# Patient Record
Sex: FEMALE | Race: WHITE | NOT HISPANIC OR LATINO | URBAN - METROPOLITAN AREA
[De-identification: names, ages, dates, MRNs, and addresses within clinical notes are randomized per-mention and may not be internally consistent; named-entity substitution may affect disease eponyms.]

---

## 2018-09-07 ENCOUNTER — EMERGENCY (EMERGENCY)
Facility: HOSPITAL | Age: 73
LOS: 1 days | Discharge: HOME | End: 2018-09-07
Attending: EMERGENCY MEDICINE
Payer: MEDICARE

## 2018-09-07 VITALS
DIASTOLIC BLOOD PRESSURE: 58 MMHG | TEMPERATURE: 98 F | OXYGEN SATURATION: 100 % | WEIGHT: 240.08 LBS | RESPIRATION RATE: 20 BRPM | SYSTOLIC BLOOD PRESSURE: 138 MMHG | HEIGHT: 67 IN | HEART RATE: 60 BPM

## 2018-09-07 VITALS
TEMPERATURE: 98 F | OXYGEN SATURATION: 98 % | RESPIRATION RATE: 18 BRPM | DIASTOLIC BLOOD PRESSURE: 63 MMHG | HEART RATE: 65 BPM | SYSTOLIC BLOOD PRESSURE: 134 MMHG

## 2018-09-07 PROCEDURE — 71250 CT THORAX DX C-: CPT | Mod: 26

## 2018-09-07 PROCEDURE — 70450 CT HEAD/BRAIN W/O DYE: CPT | Mod: 26

## 2018-09-07 PROCEDURE — 99284 EMERGENCY DEPT VISIT MOD MDM: CPT

## 2018-09-07 PROCEDURE — 70450 CT HEAD/BRAIN W/O DYE: CPT

## 2018-09-07 PROCEDURE — 71250 CT THORAX DX C-: CPT

## 2018-09-07 PROCEDURE — 99284 EMERGENCY DEPT VISIT MOD MDM: CPT | Mod: 25

## 2018-09-07 RX ORDER — ACETAMINOPHEN 500 MG
650 TABLET ORAL ONCE
Refills: 0 | Status: COMPLETED | OUTPATIENT
Start: 2018-09-07 | End: 2018-09-07

## 2018-09-07 RX ADMIN — Medication 650 MILLIGRAM(S): at 21:42

## 2018-09-07 NOTE — ED PROVIDER NOTE - OBJECTIVE STATEMENT
this is a 74 y/o female comes in s/p mechanical trip and fall   struck back of head and back pf left chest   no LOC on plavix

## 2018-09-07 NOTE — ED ADULT TRIAGE NOTE - CHIEF COMPLAINT QUOTE
Patient BIBA to ED today with c/o trip and fall hitting the back of her head, no LOC.  Patient is on plavix.  Dr. Bourne at bedside, Priority CT called.

## 2018-09-07 NOTE — ED ADULT NURSE NOTE - NSIMPLEMENTINTERV_GEN_ALL_ED
Implemented All Fall Risk Interventions:  [place] to call system. Call bell, personal items and telephone within reach. Instruct patient to call for assistance. Room bathroom lighting operational. Non-slip footwear when patient is off stretcher. Physically safe environment: no spills, clutter or unnecessary equipment. Stretcher in lowest position, wheels locked, appropriate side rails in place. Provide visual cue, wrist band, yellow gown, etc. Monitor gait and stability. Monitor for mental status changes and reorient to person, place, and time. Review medications for side effects contributing to fall risk. Reinforce activity limits and safety measures with patient and family.

## 2018-09-07 NOTE — ED PROVIDER NOTE - PHYSICAL EXAMINATION
pt mechanical fall fell and hit head   no   left back left paraspinal midthoracic tenderness ribs    BS   neck supple

## 2019-10-08 NOTE — ED PROVIDER NOTE - CONSTITUTIONAL MENTATION
awake/alert/oriented to person, place, time/situation Spironolactone Pregnancy And Lactation Text: This medication can cause feminization of the male fetus and should be avoided during pregnancy. The active metabolite is also found in breast milk.

## 2022-04-12 PROBLEM — Z00.00 ENCOUNTER FOR PREVENTIVE HEALTH EXAMINATION: Status: ACTIVE | Noted: 2022-04-12

## 2022-04-21 ENCOUNTER — APPOINTMENT (OUTPATIENT)
Dept: ORTHOPEDIC SURGERY | Facility: CLINIC | Age: 77
End: 2022-04-21
Payer: MEDICARE

## 2022-04-21 VITALS — WEIGHT: 272 LBS | BODY MASS INDEX: 43.71 KG/M2 | HEIGHT: 66 IN

## 2022-04-21 DIAGNOSIS — M81.0 AGE-RELATED OSTEOPOROSIS W/OUT CURRENT PATHOLOGICAL FRACTURE: ICD-10-CM

## 2022-04-21 DIAGNOSIS — R56.9 UNSPECIFIED CONVULSIONS: ICD-10-CM

## 2022-04-21 DIAGNOSIS — M19.90 UNSPECIFIED OSTEOARTHRITIS, UNSPECIFIED SITE: ICD-10-CM

## 2022-04-21 PROCEDURE — 73502 X-RAY EXAM HIP UNI 2-3 VIEWS: CPT

## 2022-04-21 PROCEDURE — 99203 OFFICE O/P NEW LOW 30 MIN: CPT

## 2022-04-21 RX ORDER — METHOCARBAMOL 500 MG/1
500 TABLET, FILM COATED ORAL 3 TIMES DAILY
Qty: 90 | Refills: 0 | Status: ACTIVE | COMMUNITY
Start: 2022-04-21 | End: 1900-01-01

## 2022-08-12 ENCOUNTER — APPOINTMENT (OUTPATIENT)
Dept: ORTHOPEDIC SURGERY | Facility: HOSPITAL | Age: 77
End: 2022-08-12

## 2022-08-26 ENCOUNTER — APPOINTMENT (OUTPATIENT)
Dept: ORTHOPEDIC SURGERY | Facility: CLINIC | Age: 77
End: 2022-08-26

## 2022-09-07 ENCOUNTER — OUTPATIENT (OUTPATIENT)
Dept: OUTPATIENT SERVICES | Facility: HOSPITAL | Age: 77
LOS: 1 days | Discharge: HOME | End: 2022-09-07

## 2022-09-07 VITALS
TEMPERATURE: 97 F | SYSTOLIC BLOOD PRESSURE: 156 MMHG | OXYGEN SATURATION: 97 % | RESPIRATION RATE: 16 BRPM | DIASTOLIC BLOOD PRESSURE: 69 MMHG | WEIGHT: 272.27 LBS | HEIGHT: 66 IN | HEART RATE: 68 BPM

## 2022-09-07 DIAGNOSIS — R56.9 UNSPECIFIED CONVULSIONS: ICD-10-CM

## 2022-09-07 DIAGNOSIS — Z98.890 OTHER SPECIFIED POSTPROCEDURAL STATES: Chronic | ICD-10-CM

## 2022-09-07 DIAGNOSIS — M16.12 UNILATERAL PRIMARY OSTEOARTHRITIS, LEFT HIP: ICD-10-CM

## 2022-09-07 DIAGNOSIS — Z90.49 ACQUIRED ABSENCE OF OTHER SPECIFIED PARTS OF DIGESTIVE TRACT: Chronic | ICD-10-CM

## 2022-09-07 DIAGNOSIS — G47.30 SLEEP APNEA, UNSPECIFIED: ICD-10-CM

## 2022-09-07 DIAGNOSIS — I10 ESSENTIAL (PRIMARY) HYPERTENSION: ICD-10-CM

## 2022-09-07 DIAGNOSIS — E78.5 HYPERLIPIDEMIA, UNSPECIFIED: ICD-10-CM

## 2022-09-07 DIAGNOSIS — Z87.81 PERSONAL HISTORY OF (HEALED) TRAUMATIC FRACTURE: Chronic | ICD-10-CM

## 2022-09-07 DIAGNOSIS — I25.10 ATHEROSCLEROTIC HEART DISEASE OF NATIVE CORONARY ARTERY WITHOUT ANGINA PECTORIS: ICD-10-CM

## 2022-09-07 DIAGNOSIS — Z01.818 ENCOUNTER FOR OTHER PREPROCEDURAL EXAMINATION: ICD-10-CM

## 2022-09-07 LAB
A1C WITH ESTIMATED AVERAGE GLUCOSE RESULT: 5.7 % — HIGH (ref 4–5.6)
ALBUMIN SERPL ELPH-MCNC: 3.2 G/DL — LOW (ref 3.3–5)
ALP SERPL-CCNC: 102 U/L — SIGNIFICANT CHANGE UP (ref 40–120)
ALT FLD-CCNC: 15 U/L — SIGNIFICANT CHANGE UP (ref 12–78)
ANION GAP SERPL CALC-SCNC: 2 MMOL/L — LOW (ref 5–17)
APTT BLD: 30 SEC — SIGNIFICANT CHANGE UP (ref 27.5–35.5)
AST SERPL-CCNC: 12 U/L — LOW (ref 15–37)
BASOPHILS # BLD AUTO: 0.07 K/UL — SIGNIFICANT CHANGE UP (ref 0–0.2)
BASOPHILS NFR BLD AUTO: 0.6 % — SIGNIFICANT CHANGE UP (ref 0–2)
BILIRUB SERPL-MCNC: 0.3 MG/DL — SIGNIFICANT CHANGE UP (ref 0.2–1.2)
BUN SERPL-MCNC: 21 MG/DL — SIGNIFICANT CHANGE UP (ref 7–23)
CALCIUM SERPL-MCNC: 9.4 MG/DL — SIGNIFICANT CHANGE UP (ref 8.5–10.1)
CHLORIDE SERPL-SCNC: 110 MMOL/L — HIGH (ref 96–108)
CO2 SERPL-SCNC: 29 MMOL/L — SIGNIFICANT CHANGE UP (ref 22–31)
CREAT SERPL-MCNC: 0.75 MG/DL — SIGNIFICANT CHANGE UP (ref 0.5–1.3)
EGFR: 83 ML/MIN/1.73M2 — SIGNIFICANT CHANGE UP
EOSINOPHIL # BLD AUTO: 0.18 K/UL — SIGNIFICANT CHANGE UP (ref 0–0.5)
EOSINOPHIL NFR BLD AUTO: 1.5 % — SIGNIFICANT CHANGE UP (ref 0–6)
ESTIMATED AVERAGE GLUCOSE: 117 MG/DL — HIGH (ref 68–114)
GLUCOSE SERPL-MCNC: 107 MG/DL — HIGH (ref 70–99)
HCT VFR BLD CALC: 41.4 % — SIGNIFICANT CHANGE UP (ref 34.5–45)
HGB BLD-MCNC: 12.7 G/DL — SIGNIFICANT CHANGE UP (ref 11.5–15.5)
IMM GRANULOCYTES NFR BLD AUTO: 0.4 % — SIGNIFICANT CHANGE UP (ref 0–1.5)
INR BLD: 0.93 RATIO — SIGNIFICANT CHANGE UP (ref 0.88–1.16)
LYMPHOCYTES # BLD AUTO: 1.42 K/UL — SIGNIFICANT CHANGE UP (ref 1–3.3)
LYMPHOCYTES # BLD AUTO: 11.8 % — LOW (ref 13–44)
MCHC RBC-ENTMCNC: 30.7 G/DL — LOW (ref 32–36)
MCHC RBC-ENTMCNC: 31.8 PG — SIGNIFICANT CHANGE UP (ref 27–34)
MCV RBC AUTO: 103.5 FL — HIGH (ref 80–100)
MONOCYTES # BLD AUTO: 0.8 K/UL — SIGNIFICANT CHANGE UP (ref 0–0.9)
MONOCYTES NFR BLD AUTO: 6.6 % — SIGNIFICANT CHANGE UP (ref 2–14)
MRSA PCR RESULT.: SIGNIFICANT CHANGE UP
NEUTROPHILS # BLD AUTO: 9.56 K/UL — HIGH (ref 1.8–7.4)
NEUTROPHILS NFR BLD AUTO: 79.1 % — HIGH (ref 43–77)
NRBC # BLD: 0 /100 WBCS — SIGNIFICANT CHANGE UP (ref 0–0)
PLATELET # BLD AUTO: 408 K/UL — HIGH (ref 150–400)
POTASSIUM SERPL-MCNC: 4.3 MMOL/L — SIGNIFICANT CHANGE UP (ref 3.5–5.3)
POTASSIUM SERPL-SCNC: 4.3 MMOL/L — SIGNIFICANT CHANGE UP (ref 3.5–5.3)
PROT SERPL-MCNC: 7.8 GM/DL — SIGNIFICANT CHANGE UP (ref 6–8.3)
PROTHROM AB SERPL-ACNC: 11.2 SEC — SIGNIFICANT CHANGE UP (ref 10.5–13.4)
RBC # BLD: 4 M/UL — SIGNIFICANT CHANGE UP (ref 3.8–5.2)
RBC # FLD: 12.1 % — SIGNIFICANT CHANGE UP (ref 10.3–14.5)
S AUREUS DNA NOSE QL NAA+PROBE: SIGNIFICANT CHANGE UP
SODIUM SERPL-SCNC: 141 MMOL/L — SIGNIFICANT CHANGE UP (ref 135–145)
VIT D25+D1,25 OH+D1,25 PNL SERPL-MCNC: 45.9 PG/ML — SIGNIFICANT CHANGE UP (ref 19.9–79.3)
WBC # BLD: 12.08 K/UL — HIGH (ref 3.8–10.5)
WBC # FLD AUTO: 12.08 K/UL — HIGH (ref 3.8–10.5)

## 2022-09-07 PROCEDURE — 86077 PHYS BLOOD BANK SERV XMATCH: CPT

## 2022-09-07 NOTE — PHYSICAL THERAPY INITIAL EVALUATION ADULT - GAIT DEVIATIONS NOTED, PT EVAL
antalgic gait c excessive flexion posture./decreased ana/increased time in double stance/decreased velocity of limb motion/decreased step length/decreased stride length/decreased weight-shifting ability

## 2022-09-07 NOTE — PHYSICAL THERAPY INITIAL EVALUATION ADULT - NSPTDMEREC_GEN_A_CORE
Pt owns a straight cane, rollator, and a shower bench and they are in good working condition. Pt will purchase a raised toilet seat w/o arms herself./rolling walker

## 2022-09-07 NOTE — H&P PST ADULT - PROBLEM SELECTOR PLAN 7
Assessment and Plan: labs - cbc,pt/ptt,bmp,t&s,nose cx,ekg  M/C required and cardiac  preop 3 day hibiclens instruction reviewed and given .instructed on if  nose cx positive use mupuricin 5 days and checklist given  take routine meds DOS with sips of water. avoid NSAID and OTC supplements. verbalized understanding  information on proper nutrition , increase protein and better food choices provided in packet  ensure clear  anesthesiologist to review pst labs, ekg, medical clearances and optimization for surgery

## 2022-09-07 NOTE — PHYSICAL THERAPY INITIAL EVALUATION ADULT - TINETTI BALANCE TEST, REHAB EVAL
Sitting Balance - 1/1 , Rises From Chair - 1/2, Attempts to rise -2 /2 , Immediate Standing Balance -1 /2,  Standing Balance - 1/2, Nudged -2 /2, Eyes Closed -1 /1,  Turning 360 Deg - 1 /2, Sitting down - 1/2, Balance Score -11 /16

## 2022-09-07 NOTE — PHYSICAL THERAPY INITIAL EVALUATION ADULT - LIVES WITH, PROFILE
daughter in a private house. Daughter will be available to assist pt in post -op care upon discharge home.

## 2022-09-07 NOTE — PHYSICAL THERAPY INITIAL EVALUATION ADULT - ASSISTIVE DEVICE FOR STAIR TRANSFER, REHAB EVAL
SW attempted to call [employer] but was told by the State that they are experiencing high call volume and they will call back.    Awaiting 142.        Jenni Garcia LMSW   bilateral rails

## 2022-09-07 NOTE — PHYSICAL THERAPY INITIAL EVALUATION ADULT - TINETTI GAIT TEST, REHAB EVAL
Indication of gait -0/1,   Step Length and height -1/2,   Foot Clearance -2/2,   Step Symmetry - 0/1,  Step Continuity -0/1,   Path -1/ 2,   Trunk -1/2,   Walking Time -1/1,   Total Score -6 /12

## 2022-09-07 NOTE — PHYSICAL THERAPY INITIAL EVALUATION ADULT - ADDITIONAL COMMENTS
As per pt : There are 3 steps, c R rail up, at the entry of the house and no steps to negotiate at home. Pt has a tub/shower combo c fixed/retractable shower head, and regular toilet seat c 2 grab bars in BR. Pt deferred 3-1 commode and would purchase a raised toilet seat w/o arms herself. Average pain level at rest is 0/10. Pain increases to 10/10 c sit to stand transfer, activities, and ambulation. Pt takes Tramadol prn for pain. Pt has no experience of adverse effects with pain medication. Pt is not on out-pt physical therapy at present. There is no h/o fall or knee buckling recently. Pt wears glasses for reading and distance and no need for hearing aid.

## 2022-09-07 NOTE — PHYSICAL THERAPY INITIAL EVALUATION ADULT - GENERAL OBSERVATIONS, REHAB EVAL
Patient was seen seated  in wheelchair in PT/OT preoperative assessment room with no apparent distress. Height:  5.6     ; weight :  276   lbs. Daughter, Jaci present.

## 2022-09-07 NOTE — OCCUPATIONAL THERAPY INITIAL EVALUATION ADULT - PERTINENT HX OF CURRENT PROBLEM, REHAB EVAL
L hip OA which impacts pts ability to perform functional tasks/transfers and mobility. Pt is scheduled for L THR on 09/27/22.

## 2022-09-07 NOTE — H&P PST ADULT - NSANTHOSAYNRD_GEN_A_CORE
No. BENY screening performed.  STOP BANG Legend: 0-2 = LOW Risk; 3-4 = INTERMEDIATE Risk; 5-8 = HIGH Risk

## 2022-09-07 NOTE — OCCUPATIONAL THERAPY INITIAL EVALUATION ADULT - ADDITIONAL COMMENTS
Pt lives with daughter and son in law (Who can assist post op) in a private house with 3 steps to enter with a R rail. Once inside, the pt bedroom and bathroom is on that floor when entering. The pt bathroom has a tub/shower combination, fixed/retractable shower head, standard toilet seat and grab bars + (1x on each side of toilet). Pts daughter reports that she is going to buy a raised toilet seat without arms. Pt deferred 3/1 commode. The pt ambulates with a rollator all the time and owns a cane. The pt daily pain is a 0/10 at rest and a 10/10 with movement. The pt manages the pain with rest, heating pad, muscle relaxer and Tramadol. The pt has no recent outpatient PT, no recent falls and has no buckling of the knees. The pt wears glasses all the time, R handed, does not drive and has no hearing impairments.

## 2022-09-07 NOTE — H&P PST ADULT - HISTORY OF PRESENT ILLNESS
78 yo female , pmh- htn, hld, acd  s/p PCI 10 years ago , siezure ( well controlled) c/o left hip pain secondary to osteoarthritis- scheduled for left  hip arthroplasty  goal : to walk without pain   denies recent travels in the past 30 days. No fever, SOB, cough, flu like symptoms or body rash- covid screen  covid vaccine completed         -

## 2022-09-07 NOTE — H&P PST ADULT - HAVE YOU HAD A FIRST COVID-19 BOOSTER?
Subjective:      Rigoberto Springer is a 27 y.o. female who presents with Rash            Rash  This is a new problem. The current episode started 1 to 4 weeks ago. The problem has been gradually worsening since onset. The rash is diffuse. The rash is characterized by redness, dryness, swelling and itchiness. She was exposed to nothing. Pertinent negatives include no diarrhea, fever or vomiting. Past treatments include topical steroids and antihistamine. The treatment provided mild relief.   Hives, 28 weeks pregnant      PMH:  has a past medical history of acne (8/17/2009); Menstrual irregularity (8/17/2009); Bipolar affective (CMS-[facility]) (8/17/2009); Anxiety (2010); ASTHMA; Depression (2010); Kidney disease; Migraine; Substance abuse; and Urinary tract infection, site not specified.  MEDS:   Current outpatient prescriptions:   •  Prenatal MV-Min-Fe Fum-FA-DHA (PRENATAL 1 PO), Take  by mouth., Disp: , Rfl:   •  albuterol (VENTOLIN OR PROVENTIL) 108 (90 BASE) MCG/ACT AERS, Inhale 4-6 Puffs by mouth every four hours as needed for Shortness of Breath., Disp: 1 Inhaler, Rfl: 3  •  drospirenone-ethinyl estradiol (ALANNA) 3-0.03 MG per tablet, Take 1 Tab by mouth every day., Disp: 28 Tab, Rfl: 1  •  phenazopyridine (PYRIDIUM) 200 MG TABS, Take 1 Tab by mouth 3 times a day as needed., Disp: 6 Tab, Rfl: 0  •  hydrocodone-acetaminophen (NORCO) 5-325 MG TABS per tablet, Take 1-2 Tabs by mouth every 6 hours as needed. (Patient not taking: Reported on 7/18/2015), Disp: 20 Tab, Rfl: 0  •  drospirenone-ethinyl estradiol (ALANNA) 3-0.03 MG per tablet, Take 1 Tab by mouth every day., Disp: 28 Tab, Rfl: 11  ALLERGIES:   Allergies   Allergen Reactions   • Nkda [No Known Drug Allergy]      SURGHX:   Past Surgical History   Procedure Laterality Date   • Dental surgery       [place] teeth.      SOCHX:  reports that she quit smoking about 5 months ago. Her smoking use included Cigarettes. She has a 3 pack-year smoking history. She has  "never used smokeless tobacco. She reports that she uses illicit drugs (Marijuana). She reports that she does not drink alcohol.  FH: family history includes Alcohol/Drug in her paternal grandmother.    Review of Systems   Constitutional: Negative for fever, chills and malaise/fatigue.   HENT: Negative.    Respiratory: Negative.    Cardiovascular: Negative.    Gastrointestinal: Negative for nausea, vomiting, abdominal pain and diarrhea.   Skin: Positive for itching and rash.       Medications, Allergies, and current problem list reviewed today in Epic     Objective:     /68 mmHg  Pulse 99  Temp(Src) 36.9 °C (98.4 °F)  Resp 14  Ht 1.6 m (5' 2.99\")  Wt 55.339 kg (122 lb)  BMI 21.62 kg/m2  SpO2 96%  LMP 01/25/2017     Physical Exam   Constitutional: She is oriented to person, place, and time. She appears well-developed and well-nourished. No distress.   HENT:   Head: Normocephalic and atraumatic.   Right Ear: External ear normal.   Left Ear: External ear normal.   Eyes: Conjunctivae and EOM are normal. Right eye exhibits no discharge. Left eye exhibits no discharge.   Neck: Normal range of motion. Neck supple.   Cardiovascular: Normal rate, regular rhythm and normal heart sounds.    Pulmonary/Chest: Effort normal and breath sounds normal. No respiratory distress. She has no wheezes.   Neurological: She is alert and oriented to person, place, and time.   Skin: Skin is warm and dry. Rash noted. Rash is urticarial (diffuse, pruritic. No open lesions or sxs/infxn). She is not diaphoretic. There is erythema.   Psychiatric: She has a normal mood and affect. Her behavior is normal. Judgment and thought content normal.   Nursing note and vitals reviewed.              Assessment/Plan:     1. Atopic dermatitis, unspecified type  triamcinolone acetonide (KENALOG) 0.025 % Cream    DISCONTINUED: TRIAMCINOLONE ACETONIDE, TOP, 0.05 % Ointment   2. 28 weeks gestation of pregnancy       Hives, no know triggers. INtensely " pruritic.  According to UpToDate. Short term low-medium potency topical triamcinolone safe after first trimester. Advised to use THIN layer BID for no more than 3-5 days. Call OB to verify treatment.  Continue Benadryl  Follow/up with OB  Return to clinic or go to ED if symptoms worsen or persist. Indications for ED discussed at length. Patient voices understanding. Follow-up with your primary care provider in 3-5 days. Red flags discussed.    Please note that this dictation was created using voice recognition software. I have made every reasonable attempt to correct obvious errors, but I expect that there are errors of grammar and possibly content that I did not discover before finalizing the note.     Yes

## 2022-09-07 NOTE — H&P PST ADULT - NSICDXPASTSURGICALHX_GEN_ALL_CORE_FT
PAST SURGICAL HISTORY:  H/O fracture of arm     H/O knee surgery     H/O wrist surgery     S/P appendectomy     S/P fermin     S/P primary angioplasty

## 2022-09-07 NOTE — PHYSICAL THERAPY INITIAL EVALUATION ADULT - PERTINENT HX OF CURRENT PROBLEM, REHAB EVAL
L hip OA c chronic pain and  limiting functional mobility. Pt is scheduled for L hip elective total joint replacement on 9/27/22  by  Dr. Armendariz.

## 2022-09-07 NOTE — H&P PST ADULT - ASSESSMENT
left hip osteoarthritis   CAPRINI SCORE    AGE RELATED RISK FACTORS                                                       MOBILITY RELATED FACTORS  [ ] Age 41-60 years                                            (1 Point)                  [ ] Bed rest                                                        (1 Point)  [ ] Age: 61-74 years                                           (2 Points)                [ ] Plaster cast                                                   (2 Points)  [ x] Age= 75 years                                              (3 Points)                 [ ] Bed bound for more than 72 hours                   (2 Points)    DISEASE RELATED RISK FACTORS                                               GENDER SPECIFIC FACTORS  [ ] Edema in the lower extremities                       (1 Point)                  [ ] Pregnancy                                                     (1 Point)  [ ] Varicose veins                                               (1 Point)                  [ ] Post-partum < 6 weeks                                   (1 Point)             [x ] BMI > 25 Kg/m2                                            (1 Point)                  [ ] Hormonal therapy  or oral contraception            (1 Point)                 [ ] Sepsis (in the previous month)                        (1 Point)                  [ ] History of pregnancy complications  [ ] Pneumonia or serious lung disease                                               [ ] Unexplained or recurrent                       (1 Point)           (in the previous month)                               (1 Point)  [ ] Abnormal pulmonary function test                     (1 Point)                 SURGERY RELATED RISK FACTORS  [ ] Acute myocardial infarction                              (1 Point)                 [ ]  Section                                            (1 Point)  [ ] Congestive heart failure (in the previous month)  (1 Point)                 [ ] Minor surgery                                                 (1 Point)   [ ] Inflammatory bowel disease                             (1 Point)                 [ ] Arthroscopic surgery                                        (2 Points)  [ ] Central venous access                                    (2 Points)                [ ] General surgery lasting more than 45 minutes   (2 Points)       [ ] Stroke (in the previous month)                          (5 Points)               x[ ] Elective arthroplasty                                        (5 Points)                                                                                                                                               HEMATOLOGY RELATED FACTORS                                                 TRAUMA RELATED RISK FACTORS  [ ] Prior episodes of VTE                                     (3 Points)                 [ ] Fracture of the hip, pelvis, or leg                       (5 Points)  [ ] Positive family history for VTE                         (3 Points)                 [ ] Acute spinal cord injury (in the previous month)  (5 Points)  [ ] Prothrombin 56563 A                                      (3 Points)                 [ ] Paralysis  (less than 1 month)                          (5 Points)  [ ] Factor V Leiden                                             (3 Points)                 [ ] Multiple Trauma within 1 month                         (5 Points)  [ ] Lupus anticoagulants                                     (3 Points)                                                           [ ] Anticardiolipin antibodies                                (3 Points)                                                       [ ] High homocysteine in the blood                      (3 Points)                                             [ ] Other congenital or acquired thrombophilia       (3 Points)                                                [ ] Heparin induced thrombocytopenia                  (3 Points)                                          Total Score [   9      ]  Caprini Score 0-2: Low risk, No VTE Prophylaxis required for most patient's, encourage ambulation  Caprini Score 3-6: At Risk, Pharmacologic VTE prophylaxis is indicated for most patients ( in the absence of a contraindication)  Caprini Score Greater than or = 7: High Risk , pharmacologic VTE is indicated for most patients ( in the absence of a contraindication)    Caprini score indicates that the patient is high risk for VTE event ( score 6 or greater). Surgical patient's in this group will benefit from both pharmacologic prophylaxis and intermittent compression devices . Surgical team will determine the balance between VTE  risk and bleeding risk and other clinical considerations

## 2022-09-07 NOTE — OCCUPATIONAL THERAPY INITIAL EVALUATION ADULT - NSOTDISCHREC_GEN_A_CORE
Home with home OT for home safety evaluation and to improve functional ADLs and transfers/mobility pending post op OT evaluation.

## 2022-09-07 NOTE — OCCUPATIONAL THERAPY INITIAL EVALUATION ADULT - NSOTDMEREC_GEN_A_CORE
Recommending 3/1 commode and rolling walker for safe transfers and ADL management. Pts daughter reports that she is going to buy a raised toilet seat.

## 2022-09-07 NOTE — H&P PST ADULT - NSICDXPASTMEDICALHX_GEN_ALL_CORE_FT
PAST MEDICAL HISTORY:  Anxiety and depression     CAD (coronary artery disease)     History of seizure     HLD (hyperlipidemia)     HTN (hypertension)

## 2022-09-13 PROBLEM — Z00.00 ENCOUNTER FOR PREVENTIVE HEALTH EXAMINATION: Status: ACTIVE | Noted: 2022-09-13

## 2022-09-26 ENCOUNTER — TRANSCRIPTION ENCOUNTER (OUTPATIENT)
Age: 77
End: 2022-09-26

## 2022-09-26 RX ORDER — CLOPIDOGREL BISULFATE 75 MG/1
75 TABLET, FILM COATED ORAL DAILY
Refills: 0 | Status: DISCONTINUED | OUTPATIENT
Start: 2022-09-28 | End: 2022-09-28

## 2022-09-26 RX ORDER — METOPROLOL TARTRATE 50 MG
50 TABLET ORAL DAILY
Refills: 0 | Status: DISCONTINUED | OUTPATIENT
Start: 2022-09-27 | End: 2022-09-28

## 2022-09-26 RX ORDER — SENNA PLUS 8.6 MG/1
2 TABLET ORAL AT BEDTIME
Refills: 0 | Status: DISCONTINUED | OUTPATIENT
Start: 2022-09-27 | End: 2022-09-28

## 2022-09-26 RX ORDER — DEXAMETHASONE 0.5 MG/5ML
10 ELIXIR ORAL ONCE
Refills: 0 | Status: COMPLETED | OUTPATIENT
Start: 2022-09-28 | End: 2022-09-28

## 2022-09-26 RX ORDER — ONDANSETRON 8 MG/1
4 TABLET, FILM COATED ORAL EVERY 6 HOURS
Refills: 0 | Status: DISCONTINUED | OUTPATIENT
Start: 2022-09-27 | End: 2022-09-28

## 2022-09-26 RX ORDER — ATORVASTATIN CALCIUM 80 MG/1
80 TABLET, FILM COATED ORAL AT BEDTIME
Refills: 0 | Status: DISCONTINUED | OUTPATIENT
Start: 2022-09-27 | End: 2022-09-28

## 2022-09-26 RX ORDER — HYDROCHLOROTHIAZIDE 25 MG
12.5 TABLET ORAL DAILY
Refills: 0 | Status: DISCONTINUED | OUTPATIENT
Start: 2022-09-27 | End: 2022-09-28

## 2022-09-26 RX ORDER — POLYETHYLENE GLYCOL 3350 17 G/17G
17 POWDER, FOR SOLUTION ORAL AT BEDTIME
Refills: 0 | Status: DISCONTINUED | OUTPATIENT
Start: 2022-09-27 | End: 2022-09-28

## 2022-09-26 RX ORDER — ASCORBIC ACID 60 MG
500 TABLET,CHEWABLE ORAL
Refills: 0 | Status: DISCONTINUED | OUTPATIENT
Start: 2022-09-27 | End: 2022-09-28

## 2022-09-26 RX ORDER — ACETAMINOPHEN 500 MG
1000 TABLET ORAL ONCE
Refills: 0 | Status: COMPLETED | OUTPATIENT
Start: 2022-09-27 | End: 2022-09-27

## 2022-09-26 RX ORDER — HYDROMORPHONE HYDROCHLORIDE 2 MG/ML
0.5 INJECTION INTRAMUSCULAR; INTRAVENOUS; SUBCUTANEOUS ONCE
Refills: 0 | Status: DISCONTINUED | OUTPATIENT
Start: 2022-09-27 | End: 2022-09-28

## 2022-09-26 RX ORDER — LISINOPRIL 2.5 MG/1
5 TABLET ORAL DAILY
Refills: 0 | Status: DISCONTINUED | OUTPATIENT
Start: 2022-09-27 | End: 2022-09-28

## 2022-09-26 RX ORDER — SODIUM CHLORIDE 9 MG/ML
1000 INJECTION, SOLUTION INTRAVENOUS
Refills: 0 | Status: DISCONTINUED | OUTPATIENT
Start: 2022-09-27 | End: 2022-09-28

## 2022-09-26 RX ORDER — OXYCODONE HYDROCHLORIDE 5 MG/1
5 TABLET ORAL EVERY 4 HOURS
Refills: 0 | Status: DISCONTINUED | OUTPATIENT
Start: 2022-09-27 | End: 2022-09-28

## 2022-09-26 RX ORDER — PANTOPRAZOLE SODIUM 20 MG/1
40 TABLET, DELAYED RELEASE ORAL
Refills: 0 | Status: DISCONTINUED | OUTPATIENT
Start: 2022-09-27 | End: 2022-09-28

## 2022-09-26 RX ORDER — CARBAMAZEPINE 200 MG
200 TABLET ORAL
Refills: 0 | Status: DISCONTINUED | OUTPATIENT
Start: 2022-09-27 | End: 2022-09-28

## 2022-09-26 RX ORDER — ASPIRIN/CALCIUM CARB/MAGNESIUM 324 MG
81 TABLET ORAL
Refills: 0 | Status: DISCONTINUED | OUTPATIENT
Start: 2022-09-28 | End: 2022-09-28

## 2022-09-26 RX ORDER — OXYCODONE HYDROCHLORIDE 5 MG/1
10 TABLET ORAL EVERY 4 HOURS
Refills: 0 | Status: DISCONTINUED | OUTPATIENT
Start: 2022-09-27 | End: 2022-09-28

## 2022-09-26 RX ORDER — CELECOXIB 200 MG/1
200 CAPSULE ORAL EVERY 12 HOURS
Refills: 0 | Status: DISCONTINUED | OUTPATIENT
Start: 2022-09-28 | End: 2022-09-28

## 2022-09-27 ENCOUNTER — APPOINTMENT (OUTPATIENT)
Dept: ORTHOPEDIC SURGERY | Facility: HOSPITAL | Age: 77
End: 2022-09-27

## 2022-09-27 ENCOUNTER — TRANSCRIPTION ENCOUNTER (OUTPATIENT)
Age: 77
End: 2022-09-27

## 2022-09-27 ENCOUNTER — INPATIENT (INPATIENT)
Facility: HOSPITAL | Age: 77
LOS: 0 days | Discharge: HOME | End: 2022-09-28
Attending: STUDENT IN AN ORGANIZED HEALTH CARE EDUCATION/TRAINING PROGRAM | Admitting: STUDENT IN AN ORGANIZED HEALTH CARE EDUCATION/TRAINING PROGRAM

## 2022-09-27 VITALS
TEMPERATURE: 98 F | HEART RATE: 72 BPM | HEIGHT: 66 IN | SYSTOLIC BLOOD PRESSURE: 151 MMHG | DIASTOLIC BLOOD PRESSURE: 76 MMHG | RESPIRATION RATE: 18 BRPM | WEIGHT: 270.07 LBS | OXYGEN SATURATION: 97 %

## 2022-09-27 DIAGNOSIS — Z98.890 OTHER SPECIFIED POSTPROCEDURAL STATES: Chronic | ICD-10-CM

## 2022-09-27 DIAGNOSIS — Z90.49 ACQUIRED ABSENCE OF OTHER SPECIFIED PARTS OF DIGESTIVE TRACT: Chronic | ICD-10-CM

## 2022-09-27 DIAGNOSIS — Z87.81 PERSONAL HISTORY OF (HEALED) TRAUMATIC FRACTURE: Chronic | ICD-10-CM

## 2022-09-27 LAB
ANION GAP SERPL CALC-SCNC: 6 MMOL/L — SIGNIFICANT CHANGE UP (ref 5–17)
BUN SERPL-MCNC: 20 MG/DL — SIGNIFICANT CHANGE UP (ref 7–23)
CALCIUM SERPL-MCNC: 8.9 MG/DL — SIGNIFICANT CHANGE UP (ref 8.5–10.1)
CHLORIDE SERPL-SCNC: 110 MMOL/L — HIGH (ref 96–108)
CO2 SERPL-SCNC: 27 MMOL/L — SIGNIFICANT CHANGE UP (ref 22–31)
CREAT SERPL-MCNC: 0.78 MG/DL — SIGNIFICANT CHANGE UP (ref 0.5–1.3)
EGFR: 79 ML/MIN/1.73M2 — SIGNIFICANT CHANGE UP
GLUCOSE SERPL-MCNC: 118 MG/DL — HIGH (ref 70–99)
HCT VFR BLD CALC: 37.6 % — SIGNIFICANT CHANGE UP (ref 34.5–45)
HGB BLD-MCNC: 12.1 G/DL — SIGNIFICANT CHANGE UP (ref 11.5–15.5)
MCHC RBC-ENTMCNC: 32.2 G/DL — SIGNIFICANT CHANGE UP (ref 32–36)
MCHC RBC-ENTMCNC: 32.5 PG — SIGNIFICANT CHANGE UP (ref 27–34)
MCV RBC AUTO: 101.1 FL — HIGH (ref 80–100)
NRBC # BLD: 0 /100 WBCS — SIGNIFICANT CHANGE UP (ref 0–0)
PLATELET # BLD AUTO: 387 K/UL — SIGNIFICANT CHANGE UP (ref 150–400)
POTASSIUM SERPL-MCNC: 4.6 MMOL/L — SIGNIFICANT CHANGE UP (ref 3.5–5.3)
POTASSIUM SERPL-SCNC: 4.6 MMOL/L — SIGNIFICANT CHANGE UP (ref 3.5–5.3)
RBC # BLD: 3.72 M/UL — LOW (ref 3.8–5.2)
RBC # FLD: 12 % — SIGNIFICANT CHANGE UP (ref 10.3–14.5)
SODIUM SERPL-SCNC: 143 MMOL/L — SIGNIFICANT CHANGE UP (ref 135–145)
WBC # BLD: 18.88 K/UL — HIGH (ref 3.8–10.5)
WBC # FLD AUTO: 18.88 K/UL — HIGH (ref 3.8–10.5)

## 2022-09-27 PROCEDURE — 73501 X-RAY EXAM HIP UNI 1 VIEW: CPT | Mod: 26

## 2022-09-27 PROCEDURE — 27130 TOTAL HIP ARTHROPLASTY: CPT | Mod: LT

## 2022-09-27 PROCEDURE — 27130 TOTAL HIP ARTHROPLASTY: CPT | Mod: AS,LT

## 2022-09-27 DEVICE — ALTRAX NEUT 32IDX50OD: Type: IMPLANTABLE DEVICE | Site: LEFT | Status: FUNCTIONAL

## 2022-09-27 DEVICE — FEM HD BIOLOX CERAMIC: Type: IMPLANTABLE DEVICE | Site: LEFT | Status: FUNCTIONAL

## 2022-09-27 DEVICE — CUP ACETABULAR 50MM: Type: IMPLANTABLE DEVICE | Site: LEFT | Status: FUNCTIONAL

## 2022-09-27 DEVICE — STEM FEM TAPR STD COLLAR 12/14 SZ 5: Type: IMPLANTABLE DEVICE | Site: LEFT | Status: FUNCTIONAL

## 2022-09-27 RX ORDER — ROSUVASTATIN CALCIUM 5 MG/1
1 TABLET ORAL
Qty: 0 | Refills: 0 | DISCHARGE

## 2022-09-27 RX ORDER — METOPROLOL TARTRATE 50 MG
1 TABLET ORAL
Qty: 0 | Refills: 0 | DISCHARGE

## 2022-09-27 RX ORDER — LISINOPRIL 2.5 MG/1
1 TABLET ORAL
Qty: 0 | Refills: 0 | DISCHARGE

## 2022-09-27 RX ORDER — ACETAMINOPHEN 500 MG
1000 TABLET ORAL ONCE
Refills: 0 | Status: COMPLETED | OUTPATIENT
Start: 2022-09-27 | End: 2023-08-26

## 2022-09-27 RX ORDER — LANOLIN ALCOHOL/MO/W.PET/CERES
3 CREAM (GRAM) TOPICAL AT BEDTIME
Refills: 0 | Status: DISCONTINUED | OUTPATIENT
Start: 2022-09-27 | End: 2022-09-28

## 2022-09-27 RX ORDER — ACETAMINOPHEN 500 MG
650 TABLET ORAL ONCE
Refills: 0 | Status: COMPLETED | OUTPATIENT
Start: 2022-09-27 | End: 2022-09-27

## 2022-09-27 RX ORDER — CELECOXIB 200 MG/1
200 CAPSULE ORAL ONCE
Refills: 0 | Status: COMPLETED | OUTPATIENT
Start: 2022-09-27 | End: 2022-09-27

## 2022-09-27 RX ORDER — INFLUENZA VIRUS VACCINE 15; 15; 15; 15 UG/.5ML; UG/.5ML; UG/.5ML; UG/.5ML
0.7 SUSPENSION INTRAMUSCULAR ONCE
Refills: 0 | Status: COMPLETED | OUTPATIENT
Start: 2022-09-27 | End: 2022-09-27

## 2022-09-27 RX ORDER — HYDROMORPHONE HYDROCHLORIDE 2 MG/ML
1 INJECTION INTRAMUSCULAR; INTRAVENOUS; SUBCUTANEOUS
Refills: 0 | Status: DISCONTINUED | OUTPATIENT
Start: 2022-09-27 | End: 2022-09-27

## 2022-09-27 RX ORDER — CARBAMAZEPINE 200 MG
1 TABLET ORAL
Qty: 0 | Refills: 0 | DISCHARGE

## 2022-09-27 RX ORDER — SODIUM CHLORIDE 9 MG/ML
3 INJECTION INTRAMUSCULAR; INTRAVENOUS; SUBCUTANEOUS EVERY 8 HOURS
Refills: 0 | Status: DISCONTINUED | OUTPATIENT
Start: 2022-09-27 | End: 2022-09-27

## 2022-09-27 RX ORDER — CLOPIDOGREL BISULFATE 75 MG/1
1 TABLET, FILM COATED ORAL
Qty: 0 | Refills: 0 | DISCHARGE

## 2022-09-27 RX ORDER — SODIUM CHLORIDE 9 MG/ML
1000 INJECTION, SOLUTION INTRAVENOUS
Refills: 0 | Status: DISCONTINUED | OUTPATIENT
Start: 2022-09-27 | End: 2022-09-27

## 2022-09-27 RX ORDER — ACETAMINOPHEN 500 MG
1000 TABLET ORAL ONCE
Refills: 0 | Status: COMPLETED | OUTPATIENT
Start: 2022-09-27 | End: 2022-09-28

## 2022-09-27 RX ORDER — CEFAZOLIN SODIUM 1 G
3000 VIAL (EA) INJECTION EVERY 8 HOURS
Refills: 0 | Status: COMPLETED | OUTPATIENT
Start: 2022-09-27 | End: 2022-09-28

## 2022-09-27 RX ORDER — ONDANSETRON 8 MG/1
4 TABLET, FILM COATED ORAL ONCE
Refills: 0 | Status: DISCONTINUED | OUTPATIENT
Start: 2022-09-27 | End: 2022-09-27

## 2022-09-27 RX ORDER — HYDROMORPHONE HYDROCHLORIDE 2 MG/ML
0.5 INJECTION INTRAMUSCULAR; INTRAVENOUS; SUBCUTANEOUS
Refills: 0 | Status: DISCONTINUED | OUTPATIENT
Start: 2022-09-27 | End: 2022-09-27

## 2022-09-27 RX ADMIN — Medication 200 MILLIGRAM(S): at 21:42

## 2022-09-27 RX ADMIN — CELECOXIB 200 MILLIGRAM(S): 200 CAPSULE ORAL at 08:28

## 2022-09-27 RX ADMIN — SODIUM CHLORIDE 100 MILLILITER(S): 9 INJECTION, SOLUTION INTRAVENOUS at 11:23

## 2022-09-27 RX ADMIN — Medication 500 MILLIGRAM(S): at 17:11

## 2022-09-27 RX ADMIN — Medication 1000 MILLIGRAM(S): at 15:30

## 2022-09-27 RX ADMIN — OXYCODONE HYDROCHLORIDE 10 MILLIGRAM(S): 5 TABLET ORAL at 21:53

## 2022-09-27 RX ADMIN — POLYETHYLENE GLYCOL 3350 17 GRAM(S): 17 POWDER, FOR SOLUTION ORAL at 21:38

## 2022-09-27 RX ADMIN — SODIUM CHLORIDE 115 MILLILITER(S): 9 INJECTION, SOLUTION INTRAVENOUS at 14:39

## 2022-09-27 RX ADMIN — Medication 200 MILLIGRAM(S): at 17:07

## 2022-09-27 RX ADMIN — OXYCODONE HYDROCHLORIDE 5 MILLIGRAM(S): 5 TABLET ORAL at 17:06

## 2022-09-27 RX ADMIN — OXYCODONE HYDROCHLORIDE 5 MILLIGRAM(S): 5 TABLET ORAL at 18:10

## 2022-09-27 RX ADMIN — Medication 400 MILLIGRAM(S): at 14:39

## 2022-09-27 RX ADMIN — OXYCODONE HYDROCHLORIDE 10 MILLIGRAM(S): 5 TABLET ORAL at 22:50

## 2022-09-27 RX ADMIN — SENNA PLUS 2 TABLET(S): 8.6 TABLET ORAL at 21:38

## 2022-09-27 RX ADMIN — SODIUM CHLORIDE 3 MILLILITER(S): 9 INJECTION INTRAMUSCULAR; INTRAVENOUS; SUBCUTANEOUS at 08:29

## 2022-09-27 RX ADMIN — Medication 650 MILLIGRAM(S): at 08:26

## 2022-09-27 RX ADMIN — SODIUM CHLORIDE 115 MILLILITER(S): 9 INJECTION, SOLUTION INTRAVENOUS at 21:37

## 2022-09-27 RX ADMIN — Medication 40 MILLIGRAM(S): at 21:38

## 2022-09-27 NOTE — OCCUPATIONAL THERAPY INITIAL EVALUATION ADULT - ADDITIONAL COMMENTS
Pre op confirmed - Pt lives with daughter and son in law (Who can assist post op) in a private house with 3 steps to enter with a R rail. Once inside, the pt bedroom and bathroom is on that floor when entering. The pt bathroom has a tub/shower combination, fixed/retractable shower head, standard toilet seat and grab bars + (1x on each side of toilet).

## 2022-09-27 NOTE — PROGRESS NOTE ADULT - SUBJECTIVE AND OBJECTIVE BOX
Patient is s/p L GLADYS POD#0  Pt tolerated procedure well without any intra-op complications.   Pt doing well at this time. Pain is controlled.   Denies CP/SOB/Dizziness/N/V/D/HA.     Vital Signs Last 24 Hrs  T(C): 36.6 (27 Sep 2022 13:09), Max: 36.9 (27 Sep 2022 08:01)  T(F): 97.9 (27 Sep 2022 13:09), Max: 98.5 (27 Sep 2022 08:01)  HR: 70 (27 Sep 2022 13:09) (64 - 78)  BP: 123/74 (27 Sep 2022 13:09) (118/60 - 151/76)  BP(mean): --  RR: 18 (27 Sep 2022 13:09) (14 - 18)  SpO2: 94% (27 Sep 2022 13:09) (94% - 97%)    Parameters below as of 27 Sep 2022 13:09  Patient On (Oxygen Delivery Method): room air        PE:  General: A&Ox3 NAD  LLE: JAYLYN Dressing C/D/I. abduction pillow in place. Motor intact + EHL/FHL/TA/GS. Sensation is grossly intact. Extremity warm. Compartments soft, compressible, no calf tenderness. DP 2+   RLE: Motor intact + EHL/FHL/TA/GS. Sensation is grossly intact. Extremity warm. Compartments soft, compressible, no calf tenderness. DP 2+     Labs:                          12.1   18.88 )-----------( 387      ( 27 Sep 2022 11:30 )             37.6       09-27    143  |  110<H>  |  20  ----------------------------<  118<H>  4.6   |  27  |  0.78    Ca    8.9      27 Sep 2022 11:30        A/P: Patient is a 77y y/o Female s/p L GLADYS, POD #0  -wound care, isometric exercises, GI motility, new medications, hospital course reviewed with pt  -Pain control/analgesia  -Inc spirometry reviewed and counseled  -DVT ppx with venodynes and ASA 81 BID/PLavix  -F/U AM Labs  -PT/OT/WBAT, Posterior hip precautions,   -Antibiotic post op  -Medical consult  -Discharge planning

## 2022-09-27 NOTE — DISCHARGE NOTE NURSING/CASE MANAGEMENT/SOCIAL WORK - NSDCFUADDAPPT_GEN_ALL_CORE_FT
Follow up with your surgeon in two weeks. Call for appointment.  If you need more pain medication, call your surgeon's office. For medication refills or authorizations, please call [phone]  We recommend that you call and schedule a follow up appointment within 2-4 weeks with your primary care physician for repeat blood work (CBC and BMP) for post hospital discharge follow-up care.  Call your surgeon if you have increased redness/pain/drainage or fever. Return to ER for shortness of breath/calf tenderness.

## 2022-09-27 NOTE — OCCUPATIONAL THERAPY INITIAL EVALUATION ADULT - BALANCE TRAINING, PT EVAL
Pt. will increase static/dynamic sitting balance by 1/2 grade in order to assist with safe functional mobility and ADL/IADL completion within 2-4 weeks

## 2022-09-27 NOTE — OCCUPATIONAL THERAPY INITIAL EVALUATION ADULT - RANGE OF MOTION EXAMINATION, LOWER EXTREMITY
bilateral LE Active ROM was WFL  (within functional limits) (hip precautions left LE)/bilateral LE Active ROM was WFL  (within functional limits)

## 2022-09-27 NOTE — DISCHARGE NOTE PROVIDER - NSDCFUADDINST_GEN_ALL_CORE_FT
Hip replacement precautions:  Elevate the leg (while keeping hip precautions) as often as possible to help control swelling. Incentive spirometer 10X/hr.     Keep JAYLYN Dressing Clean, Dry and Intact. May shower with JAYLYN Dressing. Please do not scrub, soak, peel or pick at the JAYLYN dressing. No creams, lotions, or oils over dressing. May shower and let water run over dressing, no baths. Pat dry once out of shower. Dressing to be removed in 7 days. Discard dressing and battery in garbage. If dressing is saturated from border to border - may remove and replace with clean dry dressing.    Shower instructions for JAYLYN Dressing: Place battery pack in a water sealed bag (such as a Ziplock bag) and keep battery out of the water.   Alternately you can turn battery pack off (press orange button.) Twist tubing OFF battery pack before entering shower. Once done with showering. Pat dressing dry. Reconnect tubing and twist ON battery pack after you are dry. Then turn battery pack on (press orange button.)   Keep Prineo Dressing Clean, Dry and Intact. May shower with Prineo Dressing. Please do not scrub, soak, peel or pick at the prineo dressing. No creams, lotions, or oils over dressing. May shower and let water run over incision, no baths. Pat dry once out of shower. Dressing to be removed in office at follow up visit in 2 weeks. There are no staples or stitches that need to be removed.  If you notice any redness, irritation, or itching around the prineo dressing call the surgeon's office

## 2022-09-27 NOTE — PHYSICAL THERAPY INITIAL EVALUATION ADULT - GENERAL OBSERVATIONS, REHAB EVAL
Pt found semi supine in bed in NAD, +hep lock, +abduction pillow, +SCDs, daughters at bedside, agreeable to PT Elian and RN Deena aware.

## 2022-09-27 NOTE — PHYSICAL THERAPY INITIAL EVALUATION ADULT - ACTIVE RANGE OF MOTION EXAMINATION, REHAB EVAL
except L hip WFL within total hip precautions/bilateral upper extremity Active ROM was WFL (within functional limits)/bilateral  lower extremity Active ROM was WFL (within functional limits)

## 2022-09-27 NOTE — OCCUPATIONAL THERAPY INITIAL EVALUATION ADULT - LLE MMT, REHAB EVAL
Grossly greater than 3/5 hip flexion; grossly greater than or equal to 3/5 knee flexion; grossly greater than 3/5 distal to knee. hip precautions, grossly impaired 3+/5

## 2022-09-27 NOTE — PHYSICAL THERAPY INITIAL EVALUATION ADULT - ADDITIONAL COMMENTS
Preop info confirmed. As per pt : There are 3 steps, c R rail up, at the entry of the house and no steps to negotiate at home. Pt has a tub/shower combo c fixed/retractable shower head, and regular toilet seat c 2 grab bars in BR. Pt deferred 3-1 commode and would purchase a raised toilet seat w/o arms herself. Average pain level at rest is 0/10. Pain increases to 10/10 c sit to stand transfer, activities, and ambulation. Pt takes Tramadol prn for pain. Pt has no experience of adverse effects with pain medication. Pt is not on out-pt physical therapy at present. There is no h/o fall or knee buckling recently. Pt wears glasses for reading and distance and no need for hearing aid.

## 2022-09-27 NOTE — OCCUPATIONAL THERAPY INITIAL EVALUATION ADULT - STRENGTHENING, PT EVAL
Pt. will increase LE strength by 1/2 grade in order to assist functional mobility and ADL/IADL completion within 2-4 weeks

## 2022-09-27 NOTE — PHYSICAL THERAPY INITIAL EVALUATION ADULT - STRENGTHENING, PT EVAL
Patient will improve L hip strength by 1 grade to improve overall functional mobility including gait, transfers, bed mobility and decrease risk of falls.

## 2022-09-27 NOTE — DISCHARGE NOTE NURSING/CASE MANAGEMENT/SOCIAL WORK - NSDCPEFALRISK_GEN_ALL_CORE
Conveyed results/recommendations to patient. Patient verbalized understanding and is agreeable to plan. No further questions at this time.     For information on Fall & Injury Prevention, visit: [email or web address] OR  [email or web address] OR  https://www.cdc.gov/steadi/patient.html

## 2022-09-27 NOTE — PATIENT PROFILE ADULT - FALL HARM RISK - HARM RISK INTERVENTIONS

## 2022-09-27 NOTE — DISCHARGE NOTE PROVIDER - CARE PROVIDER_API CALL
Pedro Armendariz)  Kervin Reed  Physicians  [address] [place] [address]  Phone: [phone]  Fax: [phone]  Follow Up Time:

## 2022-09-27 NOTE — OCCUPATIONAL THERAPY INITIAL EVALUATION ADULT - RANGE OF MOTION EXAMINATION
no Active ROM deficits were identified no Active ROM deficits were identified/trunk was WFL (within functional limits)

## 2022-09-27 NOTE — PATIENT PROFILE ADULT - FUNCTIONAL ASSESSMENT - BASIC MOBILITY 6.
Message forwarded to HIM team and discovery staff for help with Mom's questions. Most records from [facility] should be available in care everywhere.     Elza Eller RN     3 = A little assistance

## 2022-09-27 NOTE — OCCUPATIONAL THERAPY INITIAL EVALUATION ADULT - GENERAL OBSERVATIONS, REHAB EVAL
Pt was encountered semisupine in bed; NAD, L hip dressing C/D/I, JAYLYN+, S/P L GLADYS POD 0, alert, cooperative, followed commands; pt c/o pain in L hip due to s/p L THR which limits pt performance with functional ADL's/transfers and mobility

## 2022-09-27 NOTE — DISCHARGE NOTE NURSING/CASE MANAGEMENT/SOCIAL WORK - PATIENT PORTAL LINK FT
You can access the FollowMyHealth Patient Portal offered by [facility] by registering at the following website: [email or web address]. By joining [employer]’s FollowMyHealth portal, you will also be able to view your health information using other applications (apps) compatible with our system.

## 2022-09-27 NOTE — DISCHARGE NOTE PROVIDER - NSDCFUADDAPPT_GEN_ALL_CORE_FT
Follow up with your surgeon in two weeks. Call for appointment.  If you need more pain medication, call your surgeon's office. For medication refills or authorizations, please call [phone]  We recommend that you call and schedule a follow up appointment within 2-4 weeks with your primary care physician for repeat blood work (CBC and BMP) for post hospital discharge follow-up care.  Call your surgeon if you have increased redness/pain/drainage or fever. Return to ER for shortness of breath/calf tenderness.

## 2022-09-27 NOTE — DISCHARGE NOTE PROVIDER - NSDCMRMEDTOKEN_GEN_ALL_CORE_FT
carBAMazepine 200 mg oral tablet, extended release: 1 tab(s) orally 2 times a day  hydroCHLOROthiazide 12.5 mg oral tablet: 1 tab(s) orally once a day  lisinopril 5 mg oral tablet: 1 tab(s) orally once a day  PARoxetine 40 mg oral tablet: 1 tab(s) orally once a day  Plavix 75 mg oral tablet: 1 tab(s) orally once a day  rosuvastatin 40 mg oral tablet: 1 tab(s) orally once a day  Toprol-XL 50 mg oral tablet, extended release: 1 tab(s) orally once a day  Tylenol 500 mg oral tablet:    ascorbic acid 500 mg oral tablet: 1 tab(s) orally 2 times a day  aspirin 81 mg oral delayed release tablet: 1 tab(s) orally 2 times a day x 30 days MDD:2  carBAMazepine 200 mg oral tablet, extended release: 1 tab(s) orally 2 times a day  celecoxib 200 mg oral capsule: 1 cap(s) orally every 12 hours x 30 days MDD:2  hydroCHLOROthiazide 12.5 mg oral tablet: 1 tab(s) orally once a day  lisinopril 5 mg oral tablet: 1 tab(s) orally once a day  Multiple Vitamins oral tablet: 1 tab(s) orally once a day  oxyCODONE 5 mg oral tablet: 1-2 tab(s) orally every 4 hours, As needed, for pain MDD:10  pantoprazole 40 mg oral delayed release tablet: 1 tab(s) orally once a day MDD:1  PARoxetine 40 mg oral tablet: 1 tab(s) orally once a day  Plavix 75 mg oral tablet: 1 tab(s) orally once a day  rosuvastatin 40 mg oral tablet: 1 tab(s) orally once a day  senna leaf extract oral tablet: 2 tab(s) orally once a day (at bedtime)  Toprol-XL 50 mg oral tablet, extended release: 1 tab(s) orally once a day

## 2022-09-27 NOTE — DISCHARGE NOTE PROVIDER - HOSPITAL COURSE
77yFemale with history of Left hip OA presenting for L GLADYS by Dr. Armendariz on 9/27/22. Risk and benefits of surgery were explained to the patient. The patient understood and agreed to proceed with surgery. Patient underwent the procedure with no intraoperative complications. Pt was brought in stable condition to the PACU. Once stable in PACU, pt was brought to the floor. During hospital stay pt was followed by Medicine, physical therapy, Home Care during this admission. Pt had an uneventful hospital course. Pt is stable for discharge to home

## 2022-09-27 NOTE — OCCUPATIONAL THERAPY INITIAL EVALUATION ADULT - NSOTDISCHREC_GEN_A_CORE
Home with home OT for home safety evaluation and to improve functional ADLs and transfers/mobility./Home OT Home OT

## 2022-09-28 VITALS
OXYGEN SATURATION: 95 % | HEART RATE: 75 BPM | TEMPERATURE: 98 F | DIASTOLIC BLOOD PRESSURE: 65 MMHG | SYSTOLIC BLOOD PRESSURE: 111 MMHG | RESPIRATION RATE: 18 BRPM

## 2022-09-28 LAB
ANION GAP SERPL CALC-SCNC: 5 MMOL/L — SIGNIFICANT CHANGE UP (ref 5–17)
BUN SERPL-MCNC: 20 MG/DL — SIGNIFICANT CHANGE UP (ref 7–23)
CALCIUM SERPL-MCNC: 8.6 MG/DL — SIGNIFICANT CHANGE UP (ref 8.5–10.1)
CHLORIDE SERPL-SCNC: 107 MMOL/L — SIGNIFICANT CHANGE UP (ref 96–108)
CO2 SERPL-SCNC: 26 MMOL/L — SIGNIFICANT CHANGE UP (ref 22–31)
CREAT SERPL-MCNC: 0.78 MG/DL — SIGNIFICANT CHANGE UP (ref 0.5–1.3)
EGFR: 79 ML/MIN/1.73M2 — SIGNIFICANT CHANGE UP
GLUCOSE SERPL-MCNC: 149 MG/DL — HIGH (ref 70–99)
HCT VFR BLD CALC: 32.1 % — LOW (ref 34.5–45)
HGB BLD-MCNC: 10 G/DL — LOW (ref 11.5–15.5)
MCHC RBC-ENTMCNC: 31.2 G/DL — LOW (ref 32–36)
MCHC RBC-ENTMCNC: 31.6 PG — SIGNIFICANT CHANGE UP (ref 27–34)
MCV RBC AUTO: 101.6 FL — HIGH (ref 80–100)
NRBC # BLD: 0 /100 WBCS — SIGNIFICANT CHANGE UP (ref 0–0)
PLATELET # BLD AUTO: 312 K/UL — SIGNIFICANT CHANGE UP (ref 150–400)
POTASSIUM SERPL-MCNC: 4.5 MMOL/L — SIGNIFICANT CHANGE UP (ref 3.5–5.3)
POTASSIUM SERPL-SCNC: 4.5 MMOL/L — SIGNIFICANT CHANGE UP (ref 3.5–5.3)
RBC # BLD: 3.16 M/UL — LOW (ref 3.8–5.2)
RBC # FLD: 12.1 % — SIGNIFICANT CHANGE UP (ref 10.3–14.5)
SODIUM SERPL-SCNC: 138 MMOL/L — SIGNIFICANT CHANGE UP (ref 135–145)
WBC # BLD: 12.09 K/UL — HIGH (ref 3.8–10.5)
WBC # FLD AUTO: 12.09 K/UL — HIGH (ref 3.8–10.5)

## 2022-09-28 RX ORDER — ACETAMINOPHEN 500 MG
0 TABLET ORAL
Qty: 0 | Refills: 0 | DISCHARGE

## 2022-09-28 RX ORDER — ASPIRIN/CALCIUM CARB/MAGNESIUM 324 MG
1 TABLET ORAL
Qty: 60 | Refills: 0
Start: 2022-09-28 | End: 2022-10-27

## 2022-09-28 RX ORDER — ASCORBIC ACID 60 MG
1 TABLET,CHEWABLE ORAL
Qty: 0 | Refills: 0 | DISCHARGE
Start: 2022-09-28

## 2022-09-28 RX ORDER — CELECOXIB 200 MG/1
1 CAPSULE ORAL
Qty: 60 | Refills: 0
Start: 2022-09-28 | End: 2022-10-27

## 2022-09-28 RX ORDER — NALOXONE HYDROCHLORIDE 4 MG/.1ML
4 SPRAY NASAL
Qty: 1 | Refills: 0
Start: 2022-09-28 | End: 2022-09-28

## 2022-09-28 RX ORDER — PANTOPRAZOLE SODIUM 20 MG/1
1 TABLET, DELAYED RELEASE ORAL
Qty: 30 | Refills: 0
Start: 2022-09-28 | End: 2022-10-27

## 2022-09-28 RX ORDER — OXYCODONE HYDROCHLORIDE 5 MG/1
1 TABLET ORAL
Qty: 42 | Refills: 0
Start: 2022-09-28 | End: 2022-10-04

## 2022-09-28 RX ORDER — SENNA PLUS 8.6 MG/1
2 TABLET ORAL
Qty: 0 | Refills: 0 | DISCHARGE
Start: 2022-09-28

## 2022-09-28 RX ADMIN — ATORVASTATIN CALCIUM 80 MILLIGRAM(S): 80 TABLET, FILM COATED ORAL at 11:22

## 2022-09-28 RX ADMIN — OXYCODONE HYDROCHLORIDE 10 MILLIGRAM(S): 5 TABLET ORAL at 09:30

## 2022-09-28 RX ADMIN — Medication 1000 MILLIGRAM(S): at 05:11

## 2022-09-28 RX ADMIN — Medication 40 MILLIGRAM(S): at 11:22

## 2022-09-28 RX ADMIN — Medication 200 MILLIGRAM(S): at 05:04

## 2022-09-28 RX ADMIN — Medication 500 MILLIGRAM(S): at 05:04

## 2022-09-28 RX ADMIN — Medication 200 MILLIGRAM(S): at 00:30

## 2022-09-28 RX ADMIN — PANTOPRAZOLE SODIUM 40 MILLIGRAM(S): 20 TABLET, DELAYED RELEASE ORAL at 05:04

## 2022-09-28 RX ADMIN — Medication 81 MILLIGRAM(S): at 05:04

## 2022-09-28 RX ADMIN — Medication 400 MILLIGRAM(S): at 04:56

## 2022-09-28 RX ADMIN — SODIUM CHLORIDE 115 MILLILITER(S): 9 INJECTION, SOLUTION INTRAVENOUS at 05:04

## 2022-09-28 RX ADMIN — CLOPIDOGREL BISULFATE 75 MILLIGRAM(S): 75 TABLET, FILM COATED ORAL at 11:22

## 2022-09-28 RX ADMIN — Medication 1 TABLET(S): at 11:22

## 2022-09-28 RX ADMIN — CELECOXIB 200 MILLIGRAM(S): 200 CAPSULE ORAL at 05:04

## 2022-09-28 RX ADMIN — Medication 3 MILLIGRAM(S): at 00:29

## 2022-09-28 RX ADMIN — OXYCODONE HYDROCHLORIDE 10 MILLIGRAM(S): 5 TABLET ORAL at 08:37

## 2022-09-28 RX ADMIN — Medication 102 MILLIGRAM(S): at 05:08

## 2022-09-28 RX ADMIN — CELECOXIB 200 MILLIGRAM(S): 200 CAPSULE ORAL at 06:04

## 2022-09-28 NOTE — CONSULT NOTE ADULT - SUBJECTIVE AND OBJECTIVE BOX
LINDA BOYLE is a 77y Female s/p LEFT POSTERIOR TOTAL HIP REPLACEMENT      w/ h/o HTN (hypertension)    History of seizure    Anxiety and depression    CAD (coronary artery disease)    HLD (hyperlipidemia)      denies any chest pain shortness of breath palpitation dizziness lightheadedness nausea vomiting fever or chills    S/P fermin    H/O fracture of arm    H/O wrist surgery    S/P primary angioplasty    H/O knee surgery    S/P appendectomy        SH: doesnot smoke or drink at this time    No Known Allergies    ascorbic acid 500 milliGRAM(s) Oral two times a day  aspirin enteric coated 81 milliGRAM(s) Oral two times a day  atorvastatin 80 milliGRAM(s) Oral at bedtime  carBAMazepine ER Tablet 200 milliGRAM(s) Oral two times a day  celecoxib 200 milliGRAM(s) Oral every 12 hours  clopidogrel Tablet 75 milliGRAM(s) Oral daily  hydrochlorothiazide 12.5 milliGRAM(s) Oral daily  HYDROmorphone  Injectable 0.5 milliGRAM(s) IV Push once  lactated ringers. 1000 milliLiter(s) IV Continuous <Continuous>  lisinopril 5 milliGRAM(s) Oral daily  melatonin 3 milliGRAM(s) Oral at bedtime PRN  metoprolol succinate ER 50 milliGRAM(s) Oral daily  multivitamin 1 Tablet(s) Oral daily  ondansetron Injectable 4 milliGRAM(s) IV Push every 6 hours PRN  oxyCODONE    IR 5 milliGRAM(s) Oral every 4 hours PRN  oxyCODONE    IR 10 milliGRAM(s) Oral every 4 hours PRN  pantoprazole    Tablet 40 milliGRAM(s) Oral before breakfast  PARoxetine 40 milliGRAM(s) Oral daily  polyethylene glycol 3350 17 Gram(s) Oral at bedtime  senna 2 Tablet(s) Oral at bedtime    T(C): 36.7 (09-28-22 @ 12:30), Max: 36.8 (09-28-22 @ 04:30)  HR: 75 (09-28-22 @ 12:30) (66 - 80)  BP: 111/65 (09-28-22 @ 12:30) (104/67 - 128/77)  RR: 18 (09-28-22 @ 12:30) (17 - 18)  SpO2: 95% (09-28-22 @ 12:30) (93% - 95%)  HEENT unremarkable  neck no JVD or bruit  heart normal S1 S2 RRR no gallops or rubs  chest clear to auscultation  abd sof nontender non distended +bs  ext no calf tenderness    A/P   DVT PX  pain control  bowel regimen   wound care as per ortho  GI PX  antiemetics prn  incentive spirometer

## 2022-09-28 NOTE — PROGRESS NOTE ADULT - SUBJECTIVE AND OBJECTIVE BOX
Patient is s/p L GLADYS POD#1  Pt tolerated procedure well without any intra-op complications.   Pt doing well at this time. Pain is controlled.   Denies CP/SOB/Dizziness/N/V/D/HA.     Vital Signs Last 24 Hrs  T(C): 36.8 (28 Sep 2022 08:30), Max: 36.8 (28 Sep 2022 04:30)  T(F): 98.3 (28 Sep 2022 08:30), Max: 98.3 (28 Sep 2022 04:30)  HR: 70 (28 Sep 2022 08:30) (61 - 80)  BP: 128/77 (28 Sep 2022 08:30) (102/66 - 142/70)  BP(mean): --  RR: 17 (28 Sep 2022 08:30) (14 - 18)  SpO2: 94% (28 Sep 2022 08:30) (93% - 96%)    Parameters below as of 28 Sep 2022 08:30  Patient On (Oxygen Delivery Method): room air        PE:  General: A&Ox3 NAD  LLE: JAYLYN Dressing C/D/I. abduction pillow in place. Motor intact + EHL/FHL/TA/GS. Sensation is grossly intact. Extremity warm. Compartments soft, compressible, no calf tenderness. DP 2+   RLE: Motor intact + EHL/FHL/TA/GS. Sensation is grossly intact. Extremity warm. Compartments soft, compressible, no calf tenderness. DP 2+     Labs:                          10.0   12.09 )-----------( 312      ( 28 Sep 2022 07:18 )             32.1       09-28    138  |  107  |  20  ----------------------------<  149<H>  4.5   |  26  |  0.78    Ca    8.6      28 Sep 2022 07:18        A/P: Patient is a 77y y/o Female s/p L GLADYS, POD #1  -wound care, isometric exercises, GI motility, new medications, hospital course reviewed with pt  -Pain control/analgesia  -Inc spirometry reviewed and counseled  -DVT ppx with venodynes and ASA 81 BID/Plavix  -F/U AM Labs  -PT/OT/WBAT, Posterior hip precautions,   -Antibiotic post op  -Medical consult  -Discharge planning

## 2022-09-28 NOTE — CONSULT NOTE ADULT - CONSULT REQUESTED BY NAME
07/14/20 1000 07/14/20 1100   Activity/Group Checklist   Group Community meeting Other (Comment)  ([facility]h recovery and d/c needs)   Attendance Did not attend Did not attend ortho

## 2022-09-30 DIAGNOSIS — M16.12 UNILATERAL PRIMARY OSTEOARTHRITIS, LEFT HIP: ICD-10-CM

## 2022-09-30 DIAGNOSIS — I25.10 ATHEROSCLEROTIC HEART DISEASE OF NATIVE CORONARY ARTERY WITHOUT ANGINA PECTORIS: ICD-10-CM

## 2022-09-30 DIAGNOSIS — F41.9 ANXIETY DISORDER, UNSPECIFIED: ICD-10-CM

## 2022-09-30 DIAGNOSIS — I10 ESSENTIAL (PRIMARY) HYPERTENSION: ICD-10-CM

## 2022-09-30 DIAGNOSIS — E78.5 HYPERLIPIDEMIA, UNSPECIFIED: ICD-10-CM

## 2022-10-04 ENCOUNTER — NON-APPOINTMENT (OUTPATIENT)
Age: 77
End: 2022-10-04

## 2022-10-04 ENCOUNTER — TRANSCRIPTION ENCOUNTER (OUTPATIENT)
Age: 77
End: 2022-10-04

## 2022-10-10 ENCOUNTER — APPOINTMENT (OUTPATIENT)
Dept: ORTHOPEDIC SURGERY | Facility: CLINIC | Age: 77
End: 2022-10-10

## 2022-10-13 ENCOUNTER — APPOINTMENT (OUTPATIENT)
Dept: ORTHOPEDIC SURGERY | Facility: CLINIC | Age: 77
End: 2022-10-13
Payer: MEDICARE

## 2022-10-13 VITALS — BODY MASS INDEX: 43.71 KG/M2 | HEIGHT: 66 IN | WEIGHT: 272 LBS

## 2022-10-13 PROCEDURE — 73502 X-RAY EXAM HIP UNI 2-3 VIEWS: CPT | Mod: LT

## 2022-10-13 PROCEDURE — 99024 POSTOP FOLLOW-UP VISIT: CPT

## 2022-11-10 ENCOUNTER — APPOINTMENT (OUTPATIENT)
Dept: ORTHOPEDIC SURGERY | Facility: CLINIC | Age: 77
End: 2022-11-10
Payer: MEDICARE

## 2022-11-10 VITALS — WEIGHT: 272 LBS | BODY MASS INDEX: 43.71 KG/M2 | HEIGHT: 66 IN

## 2022-11-10 DIAGNOSIS — M16.0 BILATERAL PRIMARY OSTEOARTHRITIS OF HIP: ICD-10-CM

## 2022-11-10 DIAGNOSIS — M17.0 BILATERAL PRIMARY OSTEOARTHRITIS OF KNEE: ICD-10-CM

## 2022-11-10 PROCEDURE — 99213 OFFICE O/P EST LOW 20 MIN: CPT | Mod: 25

## 2022-11-10 PROCEDURE — 73564 X-RAY EXAM KNEE 4 OR MORE: CPT | Mod: 50

## 2023-02-02 ENCOUNTER — APPOINTMENT (OUTPATIENT)
Dept: ORTHOPEDIC SURGERY | Facility: CLINIC | Age: 78
End: 2023-02-02
Payer: MEDICARE

## 2023-02-02 VITALS — HEIGHT: 66 IN | WEIGHT: 272 LBS | BODY MASS INDEX: 43.71 KG/M2

## 2023-02-02 DIAGNOSIS — Z96.642 PRESENCE OF LEFT ARTIFICIAL HIP JOINT: ICD-10-CM

## 2023-02-02 PROCEDURE — 99213 OFFICE O/P EST LOW 20 MIN: CPT

## 2023-02-09 ENCOUNTER — APPOINTMENT (OUTPATIENT)
Dept: ORTHOPEDIC SURGERY | Facility: CLINIC | Age: 78
End: 2023-02-09
Payer: MEDICARE

## 2023-02-09 PROCEDURE — 20611 DRAIN/INJ JOINT/BURSA W/US: CPT | Mod: RT

## 2023-02-16 ENCOUNTER — APPOINTMENT (OUTPATIENT)
Dept: ORTHOPEDIC SURGERY | Facility: CLINIC | Age: 78
End: 2023-02-16
Payer: MEDICARE

## 2023-02-16 VITALS — WEIGHT: 272 LBS | HEIGHT: 66 IN | BODY MASS INDEX: 43.71 KG/M2

## 2023-02-16 PROCEDURE — 20610 DRAIN/INJ JOINT/BURSA W/O US: CPT | Mod: RT

## 2023-03-02 ENCOUNTER — APPOINTMENT (OUTPATIENT)
Dept: ORTHOPEDIC SURGERY | Facility: CLINIC | Age: 78
End: 2023-03-02
Payer: MEDICARE

## 2023-03-02 PROCEDURE — 20611 DRAIN/INJ JOINT/BURSA W/US: CPT

## 2023-03-02 PROCEDURE — 99214 OFFICE O/P EST MOD 30 MIN: CPT | Mod: 25

## 2023-05-08 ENCOUNTER — APPOINTMENT (OUTPATIENT)
Dept: ORTHOPEDIC SURGERY | Facility: CLINIC | Age: 78
End: 2023-05-08

## 2023-05-18 ENCOUNTER — APPOINTMENT (OUTPATIENT)
Dept: ORTHOPEDIC SURGERY | Facility: CLINIC | Age: 78
End: 2023-05-18
Payer: MEDICARE

## 2023-05-18 VITALS — WEIGHT: 272 LBS | BODY MASS INDEX: 43.71 KG/M2 | HEIGHT: 66 IN

## 2023-05-18 PROCEDURE — J3490M: CUSTOM

## 2023-05-18 PROCEDURE — 20611 DRAIN/INJ JOINT/BURSA W/US: CPT | Mod: RT

## 2023-05-18 PROCEDURE — 99214 OFFICE O/P EST MOD 30 MIN: CPT | Mod: 25

## 2023-05-18 RX ORDER — TRAMADOL HYDROCHLORIDE 50 MG/1
50 TABLET, COATED ORAL
Qty: 20 | Refills: 0 | Status: ACTIVE | COMMUNITY
Start: 2022-04-21 | End: 1900-01-01

## 2023-07-09 ENCOUNTER — INPATIENT (INPATIENT)
Facility: HOSPITAL | Age: 78
LOS: 16 days | Discharge: NURSING, REHAB OR LONG-TERM CARE | DRG: 853 | End: 2023-07-26
Attending: SURGERY | Admitting: SURGERY
Payer: MEDICARE

## 2023-07-09 VITALS
OXYGEN SATURATION: 97 % | HEART RATE: 71 BPM | DIASTOLIC BLOOD PRESSURE: 54 MMHG | WEIGHT: 279.99 LBS | SYSTOLIC BLOOD PRESSURE: 114 MMHG | HEIGHT: 66 IN | RESPIRATION RATE: 18 BRPM

## 2023-07-09 DIAGNOSIS — K57.20 DIVERTICULITIS OF LARGE INTESTINE WITH PERFORATION AND ABSCESS WITHOUT BLEEDING: ICD-10-CM

## 2023-07-09 LAB
ALBUMIN SERPL ELPH-MCNC: 3 G/DL — LOW (ref 3.3–5.2)
ALP SERPL-CCNC: 77 U/L — SIGNIFICANT CHANGE UP (ref 40–120)
ALT FLD-CCNC: 18 U/L — SIGNIFICANT CHANGE UP
ANION GAP SERPL CALC-SCNC: 11 MMOL/L — SIGNIFICANT CHANGE UP (ref 5–17)
ANION GAP SERPL CALC-SCNC: 13 MMOL/L — SIGNIFICANT CHANGE UP (ref 5–17)
ANTIBODY INTERPRETATION 2: SIGNIFICANT CHANGE UP
APPEARANCE UR: CLEAR — SIGNIFICANT CHANGE UP
APTT BLD: 27.1 SEC — LOW (ref 27.5–35.5)
AST SERPL-CCNC: 18 U/L — SIGNIFICANT CHANGE UP
BACTERIA # UR AUTO: ABNORMAL
BASE EXCESS BLDV CALC-SCNC: 2.1 MMOL/L — SIGNIFICANT CHANGE UP (ref -2–3)
BASOPHILS # BLD AUTO: 0.05 K/UL — SIGNIFICANT CHANGE UP (ref 0–0.2)
BASOPHILS # BLD AUTO: 0.07 K/UL — SIGNIFICANT CHANGE UP (ref 0–0.2)
BASOPHILS NFR BLD AUTO: 0.2 % — SIGNIFICANT CHANGE UP (ref 0–2)
BASOPHILS NFR BLD AUTO: 0.3 % — SIGNIFICANT CHANGE UP (ref 0–2)
BILIRUB SERPL-MCNC: 0.4 MG/DL — SIGNIFICANT CHANGE UP (ref 0.4–2)
BILIRUB UR-MCNC: NEGATIVE — SIGNIFICANT CHANGE UP
BLD GP AB SCN SERPL QL: SIGNIFICANT CHANGE UP
BUN SERPL-MCNC: 11.2 MG/DL — SIGNIFICANT CHANGE UP (ref 8–20)
BUN SERPL-MCNC: 14.1 MG/DL — SIGNIFICANT CHANGE UP (ref 8–20)
CA-I SERPL-SCNC: 1.08 MMOL/L — LOW (ref 1.15–1.33)
CALCIUM SERPL-MCNC: 8.2 MG/DL — LOW (ref 8.4–10.5)
CALCIUM SERPL-MCNC: 8.9 MG/DL — SIGNIFICANT CHANGE UP (ref 8.4–10.5)
CHLORIDE BLDV-SCNC: 100 MMOL/L — SIGNIFICANT CHANGE UP (ref 96–108)
CHLORIDE SERPL-SCNC: 101 MMOL/L — SIGNIFICANT CHANGE UP (ref 96–108)
CHLORIDE SERPL-SCNC: 96 MMOL/L — SIGNIFICANT CHANGE UP (ref 96–108)
CO2 SERPL-SCNC: 25 MMOL/L — SIGNIFICANT CHANGE UP (ref 22–29)
COLOR SPEC: YELLOW — SIGNIFICANT CHANGE UP
CREAT SERPL-MCNC: 0.77 MG/DL — SIGNIFICANT CHANGE UP (ref 0.5–1.3)
DIFF PNL FLD: ABNORMAL
DIR ANTIGLOB POLYSPECIFIC INTERPRETATION: SIGNIFICANT CHANGE UP
EGFR: 80 ML/MIN/1.73M2 — SIGNIFICANT CHANGE UP
EOSINOPHIL # BLD AUTO: 0.02 K/UL — SIGNIFICANT CHANGE UP (ref 0–0.5)
EOSINOPHIL # BLD AUTO: 0.03 K/UL — SIGNIFICANT CHANGE UP (ref 0–0.5)
EOSINOPHIL NFR BLD AUTO: 0.1 % — SIGNIFICANT CHANGE UP (ref 0–6)
EPI CELLS # UR: ABNORMAL
GAS PNL BLDV: 133 MMOL/L — LOW (ref 136–145)
GAS PNL BLDV: SIGNIFICANT CHANGE UP
GLUCOSE BLDV-MCNC: 122 MG/DL — HIGH (ref 70–99)
GLUCOSE SERPL-MCNC: 112 MG/DL — HIGH (ref 70–99)
GLUCOSE SERPL-MCNC: 127 MG/DL — HIGH (ref 70–99)
GLUCOSE UR QL: NEGATIVE — SIGNIFICANT CHANGE UP
HCO3 BLDV-SCNC: 27 MMOL/L — SIGNIFICANT CHANGE UP (ref 22–29)
HCT VFR BLD CALC: 32.5 % — LOW (ref 34.5–45)
HCT VFR BLD CALC: 36 % — SIGNIFICANT CHANGE UP (ref 34.5–45)
HCT VFR BLDA CALC: 33 % — SIGNIFICANT CHANGE UP
HGB BLD CALC-MCNC: 11.1 G/DL — LOW (ref 11.7–16.1)
HGB BLD-MCNC: 10.8 G/DL — LOW (ref 11.5–15.5)
HGB BLD-MCNC: 11.7 G/DL — SIGNIFICANT CHANGE UP (ref 11.5–15.5)
IMM GRANULOCYTES NFR BLD AUTO: 0.7 % — SIGNIFICANT CHANGE UP (ref 0–0.9)
INR BLD: 1.21 RATIO — HIGH (ref 0.88–1.16)
KETONES UR-MCNC: NEGATIVE — SIGNIFICANT CHANGE UP
LACTATE BLDV-MCNC: 0.9 MMOL/L — SIGNIFICANT CHANGE UP (ref 0.5–2)
LEUKOCYTE ESTERASE UR-ACNC: ABNORMAL
LYMPHOCYTES # BLD AUTO: 1 K/UL — SIGNIFICANT CHANGE UP (ref 1–3.3)
LYMPHOCYTES # BLD AUTO: 1.39 K/UL — SIGNIFICANT CHANGE UP (ref 1–3.3)
LYMPHOCYTES # BLD AUTO: 4.7 % — LOW (ref 13–44)
LYMPHOCYTES # BLD AUTO: 6 % — LOW (ref 13–44)
MAGNESIUM SERPL-MCNC: 1.9 MG/DL — SIGNIFICANT CHANGE UP (ref 1.6–2.6)
MAGNESIUM SERPL-MCNC: 2 MG/DL — SIGNIFICANT CHANGE UP (ref 1.8–2.6)
MCHC RBC-ENTMCNC: 32.5 GM/DL — SIGNIFICANT CHANGE UP (ref 32–36)
MCHC RBC-ENTMCNC: 33.2 GM/DL — SIGNIFICANT CHANGE UP (ref 32–36)
MCHC RBC-ENTMCNC: 33.2 PG — SIGNIFICANT CHANGE UP (ref 27–34)
MCHC RBC-ENTMCNC: 33.3 PG — SIGNIFICANT CHANGE UP (ref 27–34)
MCV RBC AUTO: 100.3 FL — HIGH (ref 80–100)
MCV RBC AUTO: 102.3 FL — HIGH (ref 80–100)
MONOCYTES # BLD AUTO: 1.24 K/UL — HIGH (ref 0–0.9)
MONOCYTES # BLD AUTO: 1.73 K/UL — HIGH (ref 0–0.9)
MONOCYTES NFR BLD AUTO: 5.9 % — SIGNIFICANT CHANGE UP (ref 2–14)
MONOCYTES NFR BLD AUTO: 7.5 % — SIGNIFICANT CHANGE UP (ref 2–14)
NEUTROPHILS # BLD AUTO: 18.59 K/UL — HIGH (ref 1.8–7.4)
NEUTROPHILS # BLD AUTO: 19.64 K/UL — HIGH (ref 1.8–7.4)
NEUTROPHILS NFR BLD AUTO: 85.4 % — HIGH (ref 43–77)
NEUTROPHILS NFR BLD AUTO: 88.4 % — HIGH (ref 43–77)
NITRITE UR-MCNC: POSITIVE
PCO2 BLDV: 41 MMHG — SIGNIFICANT CHANGE UP (ref 39–42)
PH BLDV: 7.42 — SIGNIFICANT CHANGE UP (ref 7.32–7.43)
PH UR: 6.5 — SIGNIFICANT CHANGE UP (ref 5–8)
PHOSPHATE SERPL-MCNC: 2.9 MG/DL — SIGNIFICANT CHANGE UP (ref 2.4–4.7)
PLATELET # BLD AUTO: 324 K/UL — SIGNIFICANT CHANGE UP (ref 150–400)
PLATELET # BLD AUTO: 331 K/UL — SIGNIFICANT CHANGE UP (ref 150–400)
PO2 BLDV: 57 MMHG — HIGH (ref 25–45)
POTASSIUM BLDV-SCNC: 3.2 MMOL/L — LOW (ref 3.5–5.1)
POTASSIUM SERPL-MCNC: 3.2 MMOL/L — LOW (ref 3.5–5.3)
POTASSIUM SERPL-MCNC: 4.2 MMOL/L — SIGNIFICANT CHANGE UP (ref 3.5–5.3)
POTASSIUM SERPL-SCNC: 3.2 MMOL/L — LOW (ref 3.5–5.3)
POTASSIUM SERPL-SCNC: 4.2 MMOL/L — SIGNIFICANT CHANGE UP (ref 3.5–5.3)
PROT SERPL-MCNC: 6.4 G/DL — LOW (ref 6.6–8.7)
PROT UR-MCNC: 15
PROTHROM AB SERPL-ACNC: 14.1 SEC — HIGH (ref 10.5–13.4)
RBC # BLD: 3.24 M/UL — LOW (ref 3.8–5.2)
RBC # BLD: 3.52 M/UL — LOW (ref 3.8–5.2)
RBC # FLD: 12.1 % — SIGNIFICANT CHANGE UP (ref 10.3–14.5)
RBC # FLD: 12.3 % — SIGNIFICANT CHANGE UP (ref 10.3–14.5)
RBC CASTS # UR COMP ASSIST: ABNORMAL /HPF (ref 0–4)
SAO2 % BLDV: 92.5 % — SIGNIFICANT CHANGE UP
SODIUM SERPL-SCNC: 134 MMOL/L — LOW (ref 135–145)
SODIUM SERPL-SCNC: 137 MMOL/L — SIGNIFICANT CHANGE UP (ref 135–145)
SP GR SPEC: 1 — LOW (ref 1.01–1.02)
UROBILINOGEN FLD QL: NEGATIVE — SIGNIFICANT CHANGE UP
WBC # BLD: 21.06 K/UL — HIGH (ref 3.8–10.5)
WBC # BLD: 23 K/UL — HIGH (ref 3.8–10.5)
WBC # FLD AUTO: 21.06 K/UL — HIGH (ref 3.8–10.5)
WBC # FLD AUTO: 23 K/UL — HIGH (ref 3.8–10.5)
WBC UR QL: ABNORMAL /HPF (ref 0–5)

## 2023-07-09 PROCEDURE — 99285 EMERGENCY DEPT VISIT HI MDM: CPT

## 2023-07-09 PROCEDURE — 86077 PHYS BLOOD BANK SERV XMATCH: CPT

## 2023-07-09 PROCEDURE — 93010 ELECTROCARDIOGRAM REPORT: CPT

## 2023-07-09 RX ORDER — ONDANSETRON 8 MG/1
4 TABLET, FILM COATED ORAL EVERY 6 HOURS
Refills: 0 | Status: DISCONTINUED | OUTPATIENT
Start: 2023-07-09 | End: 2023-07-15

## 2023-07-09 RX ORDER — ACETAMINOPHEN 500 MG
1000 TABLET ORAL EVERY 6 HOURS
Refills: 0 | Status: COMPLETED | OUTPATIENT
Start: 2023-07-09 | End: 2023-07-10

## 2023-07-09 RX ORDER — PIPERACILLIN AND TAZOBACTAM 4; .5 G/20ML; G/20ML
3.38 INJECTION, POWDER, LYOPHILIZED, FOR SOLUTION INTRAVENOUS ONCE
Refills: 0 | Status: COMPLETED | OUTPATIENT
Start: 2023-07-10 | End: 2023-07-10

## 2023-07-09 RX ORDER — SODIUM CHLORIDE 9 MG/ML
1000 INJECTION, SOLUTION INTRAVENOUS ONCE
Refills: 0 | Status: COMPLETED | OUTPATIENT
Start: 2023-07-09 | End: 2023-07-09

## 2023-07-09 RX ORDER — SODIUM CHLORIDE 9 MG/ML
1000 INJECTION, SOLUTION INTRAVENOUS
Refills: 0 | Status: DISCONTINUED | OUTPATIENT
Start: 2023-07-09 | End: 2023-07-10

## 2023-07-09 RX ORDER — CLOPIDOGREL BISULFATE 75 MG/1
75 TABLET, FILM COATED ORAL DAILY
Refills: 0 | Status: DISCONTINUED | OUTPATIENT
Start: 2023-07-09 | End: 2023-07-15

## 2023-07-09 RX ORDER — PIPERACILLIN AND TAZOBACTAM 4; .5 G/20ML; G/20ML
3.38 INJECTION, POWDER, LYOPHILIZED, FOR SOLUTION INTRAVENOUS EVERY 8 HOURS
Refills: 0 | Status: DISCONTINUED | OUTPATIENT
Start: 2023-07-10 | End: 2023-07-15

## 2023-07-09 RX ORDER — LISINOPRIL 2.5 MG/1
5 TABLET ORAL DAILY
Refills: 0 | Status: DISCONTINUED | OUTPATIENT
Start: 2023-07-09 | End: 2023-07-15

## 2023-07-09 RX ORDER — HYDROMORPHONE HYDROCHLORIDE 2 MG/ML
0.5 INJECTION INTRAMUSCULAR; INTRAVENOUS; SUBCUTANEOUS EVERY 4 HOURS
Refills: 0 | Status: DISCONTINUED | OUTPATIENT
Start: 2023-07-09 | End: 2023-07-10

## 2023-07-09 RX ORDER — KETOROLAC TROMETHAMINE 30 MG/ML
15 SYRINGE (ML) INJECTION EVERY 6 HOURS
Refills: 0 | Status: DISCONTINUED | OUTPATIENT
Start: 2023-07-09 | End: 2023-07-10

## 2023-07-09 RX ORDER — ENOXAPARIN SODIUM 100 MG/ML
40 INJECTION SUBCUTANEOUS EVERY 24 HOURS
Refills: 0 | Status: DISCONTINUED | OUTPATIENT
Start: 2023-07-09 | End: 2023-07-26

## 2023-07-09 RX ORDER — ATORVASTATIN CALCIUM 80 MG/1
40 TABLET, FILM COATED ORAL AT BEDTIME
Refills: 0 | Status: DISCONTINUED | OUTPATIENT
Start: 2023-07-09 | End: 2023-07-15

## 2023-07-09 RX ORDER — CARBAMAZEPINE 200 MG
200 TABLET ORAL
Refills: 0 | Status: DISCONTINUED | OUTPATIENT
Start: 2023-07-09 | End: 2023-07-15

## 2023-07-09 RX ORDER — METOPROLOL TARTRATE 50 MG
25 TABLET ORAL DAILY
Refills: 0 | Status: DISCONTINUED | OUTPATIENT
Start: 2023-07-09 | End: 2023-07-15

## 2023-07-09 RX ORDER — PIPERACILLIN AND TAZOBACTAM 4; .5 G/20ML; G/20ML
3.38 INJECTION, POWDER, LYOPHILIZED, FOR SOLUTION INTRAVENOUS ONCE
Refills: 0 | Status: COMPLETED | OUTPATIENT
Start: 2023-07-09 | End: 2023-07-09

## 2023-07-09 RX ORDER — POTASSIUM CHLORIDE 20 MEQ
40 PACKET (EA) ORAL EVERY 4 HOURS
Refills: 0 | Status: COMPLETED | OUTPATIENT
Start: 2023-07-09 | End: 2023-07-09

## 2023-07-09 RX ADMIN — CLOPIDOGREL BISULFATE 75 MILLIGRAM(S): 75 TABLET, FILM COATED ORAL at 11:15

## 2023-07-09 RX ADMIN — Medication 1000 MILLIGRAM(S): at 18:45

## 2023-07-09 RX ADMIN — SODIUM CHLORIDE 100 MILLILITER(S): 9 INJECTION, SOLUTION INTRAVENOUS at 10:34

## 2023-07-09 RX ADMIN — Medication 30 MILLIGRAM(S): at 11:22

## 2023-07-09 RX ADMIN — Medication 40 MILLIEQUIVALENT(S): at 13:52

## 2023-07-09 RX ADMIN — Medication 15 MILLIGRAM(S): at 11:30

## 2023-07-09 RX ADMIN — Medication 200 MILLIGRAM(S): at 18:29

## 2023-07-09 RX ADMIN — Medication 400 MILLIGRAM(S): at 18:29

## 2023-07-09 RX ADMIN — PIPERACILLIN AND TAZOBACTAM 25 GRAM(S): 4; .5 INJECTION, POWDER, LYOPHILIZED, FOR SOLUTION INTRAVENOUS at 18:29

## 2023-07-09 RX ADMIN — Medication 15 MILLIGRAM(S): at 11:15

## 2023-07-09 RX ADMIN — Medication 15 MILLIGRAM(S): at 18:29

## 2023-07-09 RX ADMIN — SODIUM CHLORIDE 100 MILLILITER(S): 9 INJECTION, SOLUTION INTRAVENOUS at 21:51

## 2023-07-09 RX ADMIN — ATORVASTATIN CALCIUM 40 MILLIGRAM(S): 80 TABLET, FILM COATED ORAL at 21:53

## 2023-07-09 RX ADMIN — Medication 15 MILLIGRAM(S): at 18:45

## 2023-07-09 RX ADMIN — PIPERACILLIN AND TAZOBACTAM 25 GRAM(S): 4; .5 INJECTION, POWDER, LYOPHILIZED, FOR SOLUTION INTRAVENOUS at 11:15

## 2023-07-09 RX ADMIN — Medication 1000 MILLIGRAM(S): at 11:30

## 2023-07-09 RX ADMIN — SODIUM CHLORIDE 1000 MILLILITER(S): 9 INJECTION, SOLUTION INTRAVENOUS at 06:10

## 2023-07-09 RX ADMIN — Medication 40 MILLIEQUIVALENT(S): at 11:15

## 2023-07-09 RX ADMIN — PIPERACILLIN AND TAZOBACTAM 200 GRAM(S): 4; .5 INJECTION, POWDER, LYOPHILIZED, FOR SOLUTION INTRAVENOUS at 09:05

## 2023-07-09 RX ADMIN — Medication 400 MILLIGRAM(S): at 11:15

## 2023-07-09 NOTE — H&P ADULT - ASSESSMENT
78 year old M, HTN, HLD, Seizures, CAD with one stent on plavix, h/o UTI's presenting with LLQ abdominal pain since July 5th, no history of diverticulitis, she has a family history of colon cancer in her brother, last colonoscopy was over 30 years ago, denies fever or chills, she has been nauseous but denies vomiting vitals on presentation was stable, in transit, EMS noted that her SBP fell to 70s, she was given a liter of fluid and responded, exam c/f LLQ tenderness, WBC of 23k, CT showed Acute perforated sigmoid diverticulitis. No abscess. There are inflammatory  changes in the adjacent fat which likely represent developing fistulization  to adjacent small bowel and possibly to the uterus.    Admit to Dr. Gutierrez, accepting surgeon   NPO, IVF, IV Abx   Monitor hemodynamics, telemetry   AM labs   [place] q4h   VTE prophylaxis

## 2023-07-09 NOTE — PATIENT PROFILE ADULT - HEALTH LITERACY
“You can access the FollowHealth Patient Portal, offered by [facility], by registering with the following website: [email or web address]” no

## 2023-07-09 NOTE — ED ADULT TRIAGE NOTE - CHIEF COMPLAINT QUOTE
BIB EMS as tx for surgery consult for perforated diverticulitis,  Pt also dx with UTI - received Zosyn at [facility].

## 2023-07-09 NOTE — ED PROVIDER NOTE - OBJECTIVE STATEMENT
78 year old female with PMHx of HTN, HLD, seizures, CAD with stent x1, transferred from [facility] for acute perforated sigmoid diverticulitis and UTI. Patient reports has had about 3 weeks of lower abdominal pain with intermittent chills and dysuria that worsened today so went to ED. Reviewed results from [place] - patient with leukocytosis 21.95, imaging demonstrating acute perforated sigmoid diverticulitis and likely developing fistulization to adjacent small bowel and possibly uterus. Also with +UTI. Patient received Zosyn and 400cc fluids prior to arrival. Per EMS BP 60s systolic en route. On arrival /54 without intervention, patient mentating well. No acute complaints on initial evaluation. 78 year old female with PMHx of HTN, HLD, seizures, CAD with stent x1, transferred from [facility] for acute perforated sigmoid diverticulitis and UTI. Patient reports has had about 3 weeks of lower abdominal pain with intermittent chills and dysuria that worsened today so went to ED. Reviewed results from [place] - patient with leukocytosis 21.95, imaging demonstrating acute perforated sigmoid diverticulitis and likely developing fistulization to adjacent small bowel and possibly uterus. Also with +UTI. Patient received Zosyn and 400cc fluids prior to arrival. Per EMS BP 60s systolic en route. On arrival /54 without intervention, patient mentating well. No acute complaints on initial evaluation. Denies nausea, vomiting, diarrhea. Reports no history of diverticulitis.

## 2023-07-09 NOTE — ED PROVIDER NOTE - CLINICAL SUMMARY MEDICAL DECISION MAKING FREE TEXT BOX
78 year old female transferred from [place] for acute perforated sigmoid diverticulitis and UTI. Received Zosyn and 400cc fluids at [place], additional fluids and repeat labs ordered. Surgery consulted. 78 year old female transferred from [place] for acute perforated sigmoid diverticulitis and UTI. Received Zosyn and 400cc fluids at [place], additional fluids and repeat labs ordered. Surgery consulted, admit to their service.

## 2023-07-09 NOTE — ED ADULT NURSE NOTE - CHIEF COMPLAINT QUOTE
BIB EMS as tx for surgery consult for perforated diverticulitis,  Pt also dx with UTI - received Zosyn at [facility].

## 2023-07-09 NOTE — ED ADULT NURSE REASSESSMENT NOTE - NS ED NURSE REASSESS COMMENT FT1
Assuming care from NOLAN Boswell, review of patients history, reason for ED visit, medication administered, tests performed/to be performed, events that precipitated ED visit, introduced to pt at bedside, IV site assessed, clean dry and intact, updated patient as to the plan of care, pt education deemed successful after teach back shows proficiency. Pt provided ample time for question at answers at conclusion of interview. Bed is locked, in the lowest position, side rails are up, personal belongings and call bell are within reach. Pt remains on the purewick, VS documented in the EMR.

## 2023-07-09 NOTE — ED ADULT NURSE NOTE - NSFALLUNIVINTERV_ED_ALL_ED
Bed/Stretcher in lowest position, wheels locked, appropriate side rails in place/Call bell, personal items and telephone in reach/Instruct patient to call for assistance before getting out of bed/chair/stretcher/Non-slip footwear applied when patient is off stretcher/[place] to call system/Physically safe environment - no spills, clutter or unnecessary equipment/Purposeful proactive rounding/Room/bathroom lighting operational, light cord in reach

## 2023-07-09 NOTE — H&P ADULT - NSHPLABSRESULTS_GEN_ALL_CORE
.  LABS:                         10.8   23.00 )-----------( 331      ( 09 Jul 2023 06:00 )             32.5     07-09    134<L>  |  96  |  14.1  ----------------------------<  127<H>  3.2<L>   |  25.0  |  0.77    Ca    8.2<L>      09 Jul 2023 06:00  Mg     1.9     07-09    TPro  6.4<L>  /  Alb  3.0<L>  /  TBili  0.4  /  DBili  x   /  AST  18  /  ALT  18  /  AlkPhos  77  07-09    PT/INR - ( 09 Jul 2023 06:00 )   PT: 14.1 sec;   INR: 1.21 ratio         PTT - ( 09 Jul 2023 06:00 )  PTT:27.1 sec  Urinalysis Basic - ( 09 Jul 2023 06:00 )    Color: x / Appearance: x / SG: x / pH: x  Gluc: 127 mg/dL / Ketone: x  / Bili: x / Urobili: x   Blood: x / Protein: x / Nitrite: x   Leuk Esterase: x / RBC: x / WBC x   Sq Epi: x / Non Sq Epi: x / Bacteria: x            RADIOLOGY, EKG & ADDITIONAL TESTS: Reviewed.    FINDINGS:  LOWER CHEST: Mild subsegmental atelectasis at the lung bases. Coronary  artery and aortic atherosclerotic calcifications.    LIVER: Within normal limits.  BILE DUCTS: Normal caliber.  GALLBLADDER: Cholecystectomy.  SPLEEN: Within normal limits.  PANCREAS: 8 mm hypodensity in the uncinate process (3:66). No pancreatic  ductal dilatation.  ADRENALS: 2.7 cm indeterminate left adrenal nodule (3:49) and a 1.1 cm  indeterminant right adrenal nodule (3:48).  KIDNEYS/URETERS: Symmetric enhancement bilaterally. No hydronephrosis.  Subcentimeter hypodensities too small to characterize. Cortical  thinning/scarring at the left lower pole.    BLADDER: Within normal limits.  REPRODUCTIVE ORGANS: Prominent endometrial stripe measuring up to 1.4 cm.  Adnexa are within normal limits.    BOWEL: Acute sigmoid diverticulitis (3:127) complicated by perforation as  suggested by perisigmoidal locules of free air. No peridiverticular abscess  is appreciated. There are inflammatory changes in the adjacent fat which  likely represent developing fistulization to adjacent small bowel and  possibly to the uterus. No bowel obstruction. Appendix is not visualized. No  evidence of inflammation in the pericecal region.  PERITONEUM: No ascites.  VESSELS: Atherosclerotic changes.  RETROPERITONEUM/LYMPH NODES: No lymphadenopathy.  ABDOMINAL WALL: Small fat-containing umbilical hernia.  BONES: Left hip replacement hardware. Mild degenerative changes.    IMPRESSION:  Acute perforated sigmoid diverticulitis. No abscess. There are inflammatory  changes in the adjacent fat which likely represent developing fistulization  to adjacent small bowel and possibly to the uterus.    Bilateral indeterminate adrenal nodules as noted above. Recommend MRI for  further characterization.    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

## 2023-07-09 NOTE — ED PROVIDER NOTE - PHYSICAL EXAMINATION
Gen: well appearing, no acute distress  Head: normocephalic, atraumatic  EENT: EOMI, moist mucous membranes, no scleral icterus, no JVD  Lung: no increased work of breathing, clear to auscultation bilaterally, no wheezing, speaking in full sentences  CV: regular rate, regular rhythm, normal s1/s2, 2+ radial pulses bilaterally  Abd: soft, (+) ttp to LLQ, non-distended, nontender in other quadrants. Nondistended abd.   MSK: No edema, no visible deformities, full range of motion in all 4 extremities  Neuro: Awake, alert, no focal neurologic deficits

## 2023-07-09 NOTE — ED PROVIDER NOTE - NS ED ROS FT
Gen: (+)fever, no change in activity level  ENT: No congestion, no rhinorrhea  Resp: No cough, no trouble breathing  Cardiovascular: No chest pain, no palpitation  Gastrointestinal: No nausea, no vomiting, no diarrhea. (+) abdominal pain  :   (+) dysuria, no hematuria  MS: No joint or muscle pain  Skin: No rashes  Neuro: No headache; no abnormal movements  Remainder negative, except as per the HPI

## 2023-07-09 NOTE — ED ADULT NURSE NOTE - OBJECTIVE STATEMENT
Assumed care of patient at 0600 in CC area of ED transferred from [facility] ED for surgery consult. Patient presented to [facility] ED c/o abdominal pain, diagnosed with perforated sigmoid diverticulitis, febrile at [facility]. Per EMS, patient hypotensive en route to [facility] ED @ 70's systolic, 1L NS admin en route. Patient awaiting surgery consult, remains on CM in NSR, SPO2 WNL on RA.

## 2023-07-09 NOTE — ED ADULT NURSE NOTE - PAIN: PRESENCE, MLM
Impression: Presbyopia: H52.4. OU. Plan: Dispensed new Rx, discussed changes with patient. complains of pain/discomfort

## 2023-07-09 NOTE — ED ADULT NURSE REASSESSMENT NOTE - NS ED NURSE REASSESS COMMENT FT1
Pt is A&OX4, pt is being transferred to CDU Bed 8R and report given to Geetha, pts current condition, medical history and reason for admission reviewed, IV site is clean/dry/intact, Pt is ambulatory with/without assistance, pt is not in any pain or distress at this time, pt is aware of the transfer, the need for the transfer and acclimated to the new unit, provided the call button, personal items within reach, bed is in lowest position, wheels are locked, pt VS recorded and placed in the EMR, pt education deemed successful after teach back shows proficiency. Pt transported to CDU on the purewick which was reconnected to wall suction.

## 2023-07-09 NOTE — ED PROVIDER NOTE - ATTENDING CONTRIBUTION TO CARE
Dr. Caldera : I have personally seen and examined this patient at the bedside. I have fully participated in the care of this patient. I have reviewed all pertinent clinical information, including history, physical exam, plan and the Resident's note and agree except as noted.     78 year old female with PMHx of HTN, HLD, seizures, CAD with stent x1, transferred from [facility] for acute perforated sigmoid diverticulitis and UTI. Patient reports has had about 3 weeks of lower abdominal pain with intermittent chills and dysuria that worsened today so went to ED. Reviewed results from [place] - patient with leukocytosis 21.95, imaging demonstrating acute perforated sigmoid diverticulitis and likely developing fistulization to adjacent small bowel and possibly uterus. Also with +UTI. Patient received Zosyn and 400cc fluids prior to arrival. Per EMS BP 60s systolic en route. On arrival /54 without intervention, patient mentating well. No acute complaints on initial evaluation.     Denies f/c/n/v/cp/sob/palpitations/cough/d/c/dysuria/hematuria. no  sick contacts/recent travel.    PE:  head; atraumatic normocephalic  eyes: perrla  Heart: rrr s1s2  lungs: ctab  abd: soft, llq and rlq ttp nd + bs no rebound/guarding no cva ttp  le: no swelling no calf ttp  back: no midline cervical/thoracic/lumbar ttp      -->perf divrticulitis bp stable ?low in route will give fluiids sx consulted will admit

## 2023-07-09 NOTE — ED ADULT TRIAGE NOTE - NS ED NURSE AMBULANCES
[facility], [facility] Carac Counseling:  I discussed with the patient the risks of Carac including but not limited to erythema, scaling, itching, weeping, crusting, and pain.

## 2023-07-10 LAB
ANION GAP SERPL CALC-SCNC: 11 MMOL/L — SIGNIFICANT CHANGE UP (ref 5–17)
BUN SERPL-MCNC: 11.7 MG/DL — SIGNIFICANT CHANGE UP (ref 8–20)
CALCIUM SERPL-MCNC: 8.8 MG/DL — SIGNIFICANT CHANGE UP (ref 8.4–10.5)
CHLORIDE SERPL-SCNC: 103 MMOL/L — SIGNIFICANT CHANGE UP (ref 96–108)
CO2 SERPL-SCNC: 24 MMOL/L — SIGNIFICANT CHANGE UP (ref 22–29)
CREAT SERPL-MCNC: 0.7 MG/DL — SIGNIFICANT CHANGE UP (ref 0.5–1.3)
CULTURE RESULTS: SIGNIFICANT CHANGE UP
EGFR: 90 ML/MIN/1.73M2 — SIGNIFICANT CHANGE UP
GLUCOSE BLDC GLUCOMTR-MCNC: 93 MG/DL — SIGNIFICANT CHANGE UP (ref 70–99)
GLUCOSE SERPL-MCNC: 99 MG/DL — SIGNIFICANT CHANGE UP (ref 70–99)
HCT VFR BLD CALC: 31.7 % — LOW (ref 34.5–45)
HCV AB S/CO SERPL IA: 0.08 S/CO — SIGNIFICANT CHANGE UP (ref 0–0.99)
HCV AB SERPL-IMP: SIGNIFICANT CHANGE UP
HGB BLD-MCNC: 10.1 G/DL — LOW (ref 11.5–15.5)
MAGNESIUM SERPL-MCNC: 2.2 MG/DL — SIGNIFICANT CHANGE UP (ref 1.6–2.6)
MCHC RBC-ENTMCNC: 31.9 GM/DL — LOW (ref 32–36)
MCHC RBC-ENTMCNC: 32.8 PG — SIGNIFICANT CHANGE UP (ref 27–34)
MCV RBC AUTO: 102.9 FL — HIGH (ref 80–100)
PHOSPHATE SERPL-MCNC: 3.4 MG/DL — SIGNIFICANT CHANGE UP (ref 2.4–4.7)
PLATELET # BLD AUTO: 325 K/UL — SIGNIFICANT CHANGE UP (ref 150–400)
POTASSIUM SERPL-MCNC: 4 MMOL/L — SIGNIFICANT CHANGE UP (ref 3.5–5.3)
POTASSIUM SERPL-SCNC: 4 MMOL/L — SIGNIFICANT CHANGE UP (ref 3.5–5.3)
RBC # BLD: 3.08 M/UL — LOW (ref 3.8–5.2)
RBC # FLD: 12.2 % — SIGNIFICANT CHANGE UP (ref 10.3–14.5)
SODIUM SERPL-SCNC: 138 MMOL/L — SIGNIFICANT CHANGE UP (ref 135–145)
SPECIMEN SOURCE: SIGNIFICANT CHANGE UP
WBC # BLD: 18.63 K/UL — HIGH (ref 3.8–10.5)
WBC # FLD AUTO: 18.63 K/UL — HIGH (ref 3.8–10.5)

## 2023-07-10 RX ORDER — SODIUM CHLORIDE 9 MG/ML
1000 INJECTION INTRAMUSCULAR; INTRAVENOUS; SUBCUTANEOUS
Refills: 0 | Status: DISCONTINUED | OUTPATIENT
Start: 2023-07-10 | End: 2023-07-12

## 2023-07-10 RX ORDER — IBUPROFEN 200 MG
400 TABLET ORAL EVERY 6 HOURS
Refills: 0 | Status: DISCONTINUED | OUTPATIENT
Start: 2023-07-10 | End: 2023-07-15

## 2023-07-10 RX ORDER — ACETAMINOPHEN 500 MG
975 TABLET ORAL EVERY 6 HOURS
Refills: 0 | Status: DISCONTINUED | OUTPATIENT
Start: 2023-07-10 | End: 2023-07-15

## 2023-07-10 RX ADMIN — PIPERACILLIN AND TAZOBACTAM 25 GRAM(S): 4; .5 INJECTION, POWDER, LYOPHILIZED, FOR SOLUTION INTRAVENOUS at 08:49

## 2023-07-10 RX ADMIN — Medication 400 MILLIGRAM(S): at 06:35

## 2023-07-10 RX ADMIN — LISINOPRIL 5 MILLIGRAM(S): 2.5 TABLET ORAL at 06:36

## 2023-07-10 RX ADMIN — PIPERACILLIN AND TAZOBACTAM 25 GRAM(S): 4; .5 INJECTION, POWDER, LYOPHILIZED, FOR SOLUTION INTRAVENOUS at 01:41

## 2023-07-10 RX ADMIN — Medication 25 MILLIGRAM(S): at 06:36

## 2023-07-10 RX ADMIN — PIPERACILLIN AND TAZOBACTAM 25 GRAM(S): 4; .5 INJECTION, POWDER, LYOPHILIZED, FOR SOLUTION INTRAVENOUS at 18:42

## 2023-07-10 RX ADMIN — Medication 1000 MILLIGRAM(S): at 07:05

## 2023-07-10 RX ADMIN — CLOPIDOGREL BISULFATE 75 MILLIGRAM(S): 75 TABLET, FILM COATED ORAL at 13:50

## 2023-07-10 RX ADMIN — Medication 15 MILLIGRAM(S): at 06:38

## 2023-07-10 RX ADMIN — Medication 1000 MILLIGRAM(S): at 01:45

## 2023-07-10 RX ADMIN — ATORVASTATIN CALCIUM 40 MILLIGRAM(S): 80 TABLET, FILM COATED ORAL at 21:12

## 2023-07-10 RX ADMIN — Medication 15 MILLIGRAM(S): at 01:54

## 2023-07-10 RX ADMIN — Medication 975 MILLIGRAM(S): at 22:12

## 2023-07-10 RX ADMIN — Medication 15 MILLIGRAM(S): at 01:24

## 2023-07-10 RX ADMIN — Medication 30 MILLIGRAM(S): at 14:14

## 2023-07-10 RX ADMIN — ENOXAPARIN SODIUM 40 MILLIGRAM(S): 100 INJECTION SUBCUTANEOUS at 06:40

## 2023-07-10 RX ADMIN — Medication 975 MILLIGRAM(S): at 21:12

## 2023-07-10 RX ADMIN — Medication 15 MILLIGRAM(S): at 07:08

## 2023-07-10 RX ADMIN — Medication 200 MILLIGRAM(S): at 06:36

## 2023-07-10 RX ADMIN — Medication 400 MILLIGRAM(S): at 01:15

## 2023-07-10 RX ADMIN — Medication 200 MILLIGRAM(S): at 18:42

## 2023-07-10 NOTE — PROGRESS NOTE ADULT - ASSESSMENT
79 y/o F with PMHx HTN, HLD, Seizures, CAD and past surgical hx of appendectomy, cholecystectomy admitted on 7/9/23 for perforated sigmoid diverticulitis with no abscess on CT was afebrile and hemodynamically stable this morning. Mild tenderness on abdominal examination and WBC trending down from 21.06 to 18.63.     PLAN  -NPO/IVF  -pain control  -strict Is/Os  -continue home meds   -trend labs, replete electrolytes as needed  -encourage OOB  -incentive spirometry  -[place]  -DVT ppx: SCDs, Lovenox 40 daily

## 2023-07-11 LAB
ANION GAP SERPL CALC-SCNC: 11 MMOL/L — SIGNIFICANT CHANGE UP (ref 5–17)
BASOPHILS # BLD AUTO: 0.08 K/UL — SIGNIFICANT CHANGE UP (ref 0–0.2)
BASOPHILS NFR BLD AUTO: 0.5 % — SIGNIFICANT CHANGE UP (ref 0–2)
BUN SERPL-MCNC: 11.8 MG/DL — SIGNIFICANT CHANGE UP (ref 8–20)
CALCIUM SERPL-MCNC: 9 MG/DL — SIGNIFICANT CHANGE UP (ref 8.4–10.5)
CHLORIDE SERPL-SCNC: 102 MMOL/L — SIGNIFICANT CHANGE UP (ref 96–108)
CO2 SERPL-SCNC: 24 MMOL/L — SIGNIFICANT CHANGE UP (ref 22–29)
CREAT SERPL-MCNC: 0.73 MG/DL — SIGNIFICANT CHANGE UP (ref 0.5–1.3)
EGFR: 85 ML/MIN/1.73M2 — SIGNIFICANT CHANGE UP
EOSINOPHIL # BLD AUTO: 0.33 K/UL — SIGNIFICANT CHANGE UP (ref 0–0.5)
EOSINOPHIL NFR BLD AUTO: 1.9 % — SIGNIFICANT CHANGE UP (ref 0–6)
GLUCOSE SERPL-MCNC: 77 MG/DL — SIGNIFICANT CHANGE UP (ref 70–99)
HCT VFR BLD CALC: 33.8 % — LOW (ref 34.5–45)
HGB BLD-MCNC: 10.5 G/DL — LOW (ref 11.5–15.5)
IMM GRANULOCYTES NFR BLD AUTO: 0.8 % — SIGNIFICANT CHANGE UP (ref 0–0.9)
LYMPHOCYTES # BLD AUTO: 1.35 K/UL — SIGNIFICANT CHANGE UP (ref 1–3.3)
LYMPHOCYTES # BLD AUTO: 7.8 % — LOW (ref 13–44)
MAGNESIUM SERPL-MCNC: 2.1 MG/DL — SIGNIFICANT CHANGE UP (ref 1.6–2.6)
MCHC RBC-ENTMCNC: 31.1 GM/DL — LOW (ref 32–36)
MCHC RBC-ENTMCNC: 32.5 PG — SIGNIFICANT CHANGE UP (ref 27–34)
MCV RBC AUTO: 104.6 FL — HIGH (ref 80–100)
MONOCYTES # BLD AUTO: 0.98 K/UL — HIGH (ref 0–0.9)
MONOCYTES NFR BLD AUTO: 5.7 % — SIGNIFICANT CHANGE UP (ref 2–14)
NEUTROPHILS # BLD AUTO: 14.37 K/UL — HIGH (ref 1.8–7.4)
NEUTROPHILS NFR BLD AUTO: 83.3 % — HIGH (ref 43–77)
PHOSPHATE SERPL-MCNC: 3.4 MG/DL — SIGNIFICANT CHANGE UP (ref 2.4–4.7)
PLATELET # BLD AUTO: 356 K/UL — SIGNIFICANT CHANGE UP (ref 150–400)
POTASSIUM SERPL-MCNC: 4.3 MMOL/L — SIGNIFICANT CHANGE UP (ref 3.5–5.3)
POTASSIUM SERPL-SCNC: 4.3 MMOL/L — SIGNIFICANT CHANGE UP (ref 3.5–5.3)
RBC # BLD: 3.23 M/UL — LOW (ref 3.8–5.2)
RBC # FLD: 11.9 % — SIGNIFICANT CHANGE UP (ref 10.3–14.5)
SODIUM SERPL-SCNC: 137 MMOL/L — SIGNIFICANT CHANGE UP (ref 135–145)
WBC # BLD: 17.24 K/UL — HIGH (ref 3.8–10.5)
WBC # FLD AUTO: 17.24 K/UL — HIGH (ref 3.8–10.5)

## 2023-07-11 PROCEDURE — 74177 CT ABD & PELVIS W/CONTRAST: CPT | Mod: 26

## 2023-07-11 RX ADMIN — Medication 975 MILLIGRAM(S): at 12:45

## 2023-07-11 RX ADMIN — Medication 400 MILLIGRAM(S): at 01:35

## 2023-07-11 RX ADMIN — Medication 400 MILLIGRAM(S): at 17:09

## 2023-07-11 RX ADMIN — Medication 200 MILLIGRAM(S): at 17:10

## 2023-07-11 RX ADMIN — PIPERACILLIN AND TAZOBACTAM 25 GRAM(S): 4; .5 INJECTION, POWDER, LYOPHILIZED, FOR SOLUTION INTRAVENOUS at 09:29

## 2023-07-11 RX ADMIN — Medication 400 MILLIGRAM(S): at 22:10

## 2023-07-11 RX ADMIN — PIPERACILLIN AND TAZOBACTAM 25 GRAM(S): 4; .5 INJECTION, POWDER, LYOPHILIZED, FOR SOLUTION INTRAVENOUS at 01:05

## 2023-07-11 RX ADMIN — PIPERACILLIN AND TAZOBACTAM 25 GRAM(S): 4; .5 INJECTION, POWDER, LYOPHILIZED, FOR SOLUTION INTRAVENOUS at 17:10

## 2023-07-11 RX ADMIN — Medication 25 MILLIGRAM(S): at 06:07

## 2023-07-11 RX ADMIN — Medication 975 MILLIGRAM(S): at 06:04

## 2023-07-11 RX ADMIN — Medication 400 MILLIGRAM(S): at 06:06

## 2023-07-11 RX ADMIN — Medication 400 MILLIGRAM(S): at 11:50

## 2023-07-11 RX ADMIN — LISINOPRIL 5 MILLIGRAM(S): 2.5 TABLET ORAL at 06:07

## 2023-07-11 RX ADMIN — Medication 975 MILLIGRAM(S): at 11:50

## 2023-07-11 RX ADMIN — ENOXAPARIN SODIUM 40 MILLIGRAM(S): 100 INJECTION SUBCUTANEOUS at 06:09

## 2023-07-11 RX ADMIN — SODIUM CHLORIDE 100 MILLILITER(S): 9 INJECTION INTRAMUSCULAR; INTRAVENOUS; SUBCUTANEOUS at 01:05

## 2023-07-11 RX ADMIN — Medication 400 MILLIGRAM(S): at 12:45

## 2023-07-11 RX ADMIN — SODIUM CHLORIDE 100 MILLILITER(S): 9 INJECTION INTRAMUSCULAR; INTRAVENOUS; SUBCUTANEOUS at 09:29

## 2023-07-11 RX ADMIN — Medication 975 MILLIGRAM(S): at 18:00

## 2023-07-11 RX ADMIN — Medication 30 MILLIGRAM(S): at 11:50

## 2023-07-11 RX ADMIN — Medication 975 MILLIGRAM(S): at 06:34

## 2023-07-11 RX ADMIN — ATORVASTATIN CALCIUM 40 MILLIGRAM(S): 80 TABLET, FILM COATED ORAL at 21:49

## 2023-07-11 RX ADMIN — Medication 400 MILLIGRAM(S): at 06:36

## 2023-07-11 RX ADMIN — CLOPIDOGREL BISULFATE 75 MILLIGRAM(S): 75 TABLET, FILM COATED ORAL at 11:50

## 2023-07-11 RX ADMIN — Medication 400 MILLIGRAM(S): at 18:00

## 2023-07-11 RX ADMIN — Medication 200 MILLIGRAM(S): at 06:08

## 2023-07-11 RX ADMIN — Medication 400 MILLIGRAM(S): at 21:48

## 2023-07-11 RX ADMIN — Medication 400 MILLIGRAM(S): at 01:05

## 2023-07-11 RX ADMIN — Medication 975 MILLIGRAM(S): at 17:09

## 2023-07-12 LAB
ANION GAP SERPL CALC-SCNC: 15 MMOL/L — SIGNIFICANT CHANGE UP (ref 5–17)
BASOPHILS # BLD AUTO: 0.08 K/UL — SIGNIFICANT CHANGE UP (ref 0–0.2)
BASOPHILS NFR BLD AUTO: 0.4 % — SIGNIFICANT CHANGE UP (ref 0–2)
BUN SERPL-MCNC: 9.9 MG/DL — SIGNIFICANT CHANGE UP (ref 8–20)
CALCIUM SERPL-MCNC: 8.8 MG/DL — SIGNIFICANT CHANGE UP (ref 8.4–10.5)
CHLORIDE SERPL-SCNC: 102 MMOL/L — SIGNIFICANT CHANGE UP (ref 96–108)
CO2 SERPL-SCNC: 22 MMOL/L — SIGNIFICANT CHANGE UP (ref 22–29)
CREAT SERPL-MCNC: 0.74 MG/DL — SIGNIFICANT CHANGE UP (ref 0.5–1.3)
EGFR: 84 ML/MIN/1.73M2 — SIGNIFICANT CHANGE UP
EOSINOPHIL # BLD AUTO: 0.26 K/UL — SIGNIFICANT CHANGE UP (ref 0–0.5)
EOSINOPHIL NFR BLD AUTO: 1.3 % — SIGNIFICANT CHANGE UP (ref 0–6)
GLUCOSE SERPL-MCNC: 78 MG/DL — SIGNIFICANT CHANGE UP (ref 70–99)
HCT VFR BLD CALC: 32.3 % — LOW (ref 34.5–45)
HGB BLD-MCNC: 10.4 G/DL — LOW (ref 11.5–15.5)
IMM GRANULOCYTES NFR BLD AUTO: 0.7 % — SIGNIFICANT CHANGE UP (ref 0–0.9)
LYMPHOCYTES # BLD AUTO: 0.74 K/UL — LOW (ref 1–3.3)
LYMPHOCYTES # BLD AUTO: 3.8 % — LOW (ref 13–44)
MAGNESIUM SERPL-MCNC: 2 MG/DL — SIGNIFICANT CHANGE UP (ref 1.8–2.6)
MCHC RBC-ENTMCNC: 32.2 GM/DL — SIGNIFICANT CHANGE UP (ref 32–36)
MCHC RBC-ENTMCNC: 32.4 PG — SIGNIFICANT CHANGE UP (ref 27–34)
MCV RBC AUTO: 100.6 FL — HIGH (ref 80–100)
MONOCYTES # BLD AUTO: 1.08 K/UL — HIGH (ref 0–0.9)
MONOCYTES NFR BLD AUTO: 5.5 % — SIGNIFICANT CHANGE UP (ref 2–14)
NEUTROPHILS # BLD AUTO: 17.25 K/UL — HIGH (ref 1.8–7.4)
NEUTROPHILS NFR BLD AUTO: 88.3 % — HIGH (ref 43–77)
PHOSPHATE SERPL-MCNC: 3.3 MG/DL — SIGNIFICANT CHANGE UP (ref 2.4–4.7)
PLATELET # BLD AUTO: 388 K/UL — SIGNIFICANT CHANGE UP (ref 150–400)
POTASSIUM SERPL-MCNC: 3.4 MMOL/L — LOW (ref 3.5–5.3)
POTASSIUM SERPL-SCNC: 3.4 MMOL/L — LOW (ref 3.5–5.3)
RBC # BLD: 3.21 M/UL — LOW (ref 3.8–5.2)
RBC # FLD: 11.9 % — SIGNIFICANT CHANGE UP (ref 10.3–14.5)
SODIUM SERPL-SCNC: 139 MMOL/L — SIGNIFICANT CHANGE UP (ref 135–145)
WBC # BLD: 19.55 K/UL — HIGH (ref 3.8–10.5)
WBC # FLD AUTO: 19.55 K/UL — HIGH (ref 3.8–10.5)

## 2023-07-12 RX ORDER — POTASSIUM CHLORIDE 20 MEQ
20 PACKET (EA) ORAL ONCE
Refills: 0 | Status: COMPLETED | OUTPATIENT
Start: 2023-07-12 | End: 2023-07-12

## 2023-07-12 RX ORDER — POTASSIUM CHLORIDE 20 MEQ
20 PACKET (EA) ORAL ONCE
Refills: 0 | Status: DISCONTINUED | OUTPATIENT
Start: 2023-07-12 | End: 2023-07-12

## 2023-07-12 RX ORDER — POTASSIUM CHLORIDE 20 MEQ
40 PACKET (EA) ORAL ONCE
Refills: 0 | Status: COMPLETED | OUTPATIENT
Start: 2023-07-12 | End: 2023-07-12

## 2023-07-12 RX ADMIN — LISINOPRIL 5 MILLIGRAM(S): 2.5 TABLET ORAL at 06:13

## 2023-07-12 RX ADMIN — Medication 975 MILLIGRAM(S): at 18:25

## 2023-07-12 RX ADMIN — Medication 400 MILLIGRAM(S): at 06:13

## 2023-07-12 RX ADMIN — Medication 200 MILLIGRAM(S): at 18:25

## 2023-07-12 RX ADMIN — ENOXAPARIN SODIUM 40 MILLIGRAM(S): 100 INJECTION SUBCUTANEOUS at 06:12

## 2023-07-12 RX ADMIN — CLOPIDOGREL BISULFATE 75 MILLIGRAM(S): 75 TABLET, FILM COATED ORAL at 11:49

## 2023-07-12 RX ADMIN — PIPERACILLIN AND TAZOBACTAM 25 GRAM(S): 4; .5 INJECTION, POWDER, LYOPHILIZED, FOR SOLUTION INTRAVENOUS at 09:06

## 2023-07-12 RX ADMIN — PIPERACILLIN AND TAZOBACTAM 25 GRAM(S): 4; .5 INJECTION, POWDER, LYOPHILIZED, FOR SOLUTION INTRAVENOUS at 00:19

## 2023-07-12 RX ADMIN — Medication 975 MILLIGRAM(S): at 06:12

## 2023-07-12 RX ADMIN — Medication 40 MILLIEQUIVALENT(S): at 18:25

## 2023-07-12 RX ADMIN — ATORVASTATIN CALCIUM 40 MILLIGRAM(S): 80 TABLET, FILM COATED ORAL at 22:17

## 2023-07-12 RX ADMIN — Medication 200 MILLIGRAM(S): at 06:13

## 2023-07-12 RX ADMIN — Medication 25 MILLIGRAM(S): at 06:12

## 2023-07-12 RX ADMIN — Medication 975 MILLIGRAM(S): at 18:55

## 2023-07-12 RX ADMIN — Medication 400 MILLIGRAM(S): at 12:40

## 2023-07-12 RX ADMIN — Medication 975 MILLIGRAM(S): at 11:49

## 2023-07-12 RX ADMIN — Medication 975 MILLIGRAM(S): at 00:45

## 2023-07-12 RX ADMIN — Medication 30 MILLIGRAM(S): at 11:49

## 2023-07-12 RX ADMIN — Medication 975 MILLIGRAM(S): at 00:19

## 2023-07-12 RX ADMIN — Medication 975 MILLIGRAM(S): at 06:42

## 2023-07-12 RX ADMIN — Medication 400 MILLIGRAM(S): at 18:55

## 2023-07-12 RX ADMIN — Medication 975 MILLIGRAM(S): at 12:40

## 2023-07-12 RX ADMIN — Medication 400 MILLIGRAM(S): at 11:49

## 2023-07-12 RX ADMIN — Medication 400 MILLIGRAM(S): at 06:43

## 2023-07-12 RX ADMIN — Medication 400 MILLIGRAM(S): at 18:25

## 2023-07-12 RX ADMIN — PIPERACILLIN AND TAZOBACTAM 25 GRAM(S): 4; .5 INJECTION, POWDER, LYOPHILIZED, FOR SOLUTION INTRAVENOUS at 18:25

## 2023-07-12 RX ADMIN — Medication 20 MILLIEQUIVALENT(S): at 18:25

## 2023-07-12 NOTE — PROGRESS NOTE ADULT - ASSESSMENT
Ms. Rocha is a 78F with diverticulitis who is improving clinically with medical management.    PLAN  - Trend WBC, abdominal exam is improving  - Advance diet to Low Fiber if WBC continue to downtrend  - Continue zosyn  - Encourage OOB  - DVT ppx: lovenox and SCDs  - Ibuprofen and acetaminophen for pain      Royal Rico MD  General Surgery Resident

## 2023-07-13 LAB
ANION GAP SERPL CALC-SCNC: 13 MMOL/L — SIGNIFICANT CHANGE UP (ref 5–17)
BASOPHILS # BLD AUTO: 0.07 K/UL — SIGNIFICANT CHANGE UP (ref 0–0.2)
BASOPHILS NFR BLD AUTO: 0.4 % — SIGNIFICANT CHANGE UP (ref 0–2)
BUN SERPL-MCNC: 9.8 MG/DL — SIGNIFICANT CHANGE UP (ref 8–20)
C DIFF BY PCR RESULT: SIGNIFICANT CHANGE UP
CALCIUM SERPL-MCNC: 8.5 MG/DL — SIGNIFICANT CHANGE UP (ref 8.4–10.5)
CHLORIDE SERPL-SCNC: 102 MMOL/L — SIGNIFICANT CHANGE UP (ref 96–108)
CO2 SERPL-SCNC: 23 MMOL/L — SIGNIFICANT CHANGE UP (ref 22–29)
CREAT SERPL-MCNC: 0.8 MG/DL — SIGNIFICANT CHANGE UP (ref 0.5–1.3)
EGFR: 76 ML/MIN/1.73M2 — SIGNIFICANT CHANGE UP
EOSINOPHIL # BLD AUTO: 0.15 K/UL — SIGNIFICANT CHANGE UP (ref 0–0.5)
EOSINOPHIL NFR BLD AUTO: 0.8 % — SIGNIFICANT CHANGE UP (ref 0–6)
GLUCOSE SERPL-MCNC: 123 MG/DL — HIGH (ref 70–99)
HCT VFR BLD CALC: 31.4 % — LOW (ref 34.5–45)
HGB BLD-MCNC: 10.3 G/DL — LOW (ref 11.5–15.5)
IMM GRANULOCYTES NFR BLD AUTO: 1.1 % — HIGH (ref 0–0.9)
LYMPHOCYTES # BLD AUTO: 0.85 K/UL — LOW (ref 1–3.3)
LYMPHOCYTES # BLD AUTO: 4.5 % — LOW (ref 13–44)
MAGNESIUM SERPL-MCNC: 1.7 MG/DL — SIGNIFICANT CHANGE UP (ref 1.6–2.6)
MCHC RBC-ENTMCNC: 32.4 PG — SIGNIFICANT CHANGE UP (ref 27–34)
MCHC RBC-ENTMCNC: 32.8 GM/DL — SIGNIFICANT CHANGE UP (ref 32–36)
MCV RBC AUTO: 98.7 FL — SIGNIFICANT CHANGE UP (ref 80–100)
MONOCYTES # BLD AUTO: 1.08 K/UL — HIGH (ref 0–0.9)
MONOCYTES NFR BLD AUTO: 5.8 % — SIGNIFICANT CHANGE UP (ref 2–14)
NEUTROPHILS # BLD AUTO: 16.34 K/UL — HIGH (ref 1.8–7.4)
NEUTROPHILS NFR BLD AUTO: 87.4 % — HIGH (ref 43–77)
PHOSPHATE SERPL-MCNC: 2.7 MG/DL — SIGNIFICANT CHANGE UP (ref 2.4–4.7)
PLATELET # BLD AUTO: 450 K/UL — HIGH (ref 150–400)
POTASSIUM SERPL-MCNC: 2.9 MMOL/L — CRITICAL LOW (ref 3.5–5.3)
POTASSIUM SERPL-SCNC: 2.9 MMOL/L — CRITICAL LOW (ref 3.5–5.3)
RBC # BLD: 3.18 M/UL — LOW (ref 3.8–5.2)
RBC # FLD: 11.9 % — SIGNIFICANT CHANGE UP (ref 10.3–14.5)
SODIUM SERPL-SCNC: 138 MMOL/L — SIGNIFICANT CHANGE UP (ref 135–145)
WBC # BLD: 18.7 K/UL — HIGH (ref 3.8–10.5)
WBC # FLD AUTO: 18.7 K/UL — HIGH (ref 3.8–10.5)

## 2023-07-13 PROCEDURE — 99222 1ST HOSP IP/OBS MODERATE 55: CPT

## 2023-07-13 PROCEDURE — 99223 1ST HOSP IP/OBS HIGH 75: CPT

## 2023-07-13 RX ORDER — MAGNESIUM SULFATE 500 MG/ML
1 VIAL (ML) INJECTION ONCE
Refills: 0 | Status: COMPLETED | OUTPATIENT
Start: 2023-07-13 | End: 2023-07-13

## 2023-07-13 RX ORDER — POTASSIUM CHLORIDE 20 MEQ
40 PACKET (EA) ORAL ONCE
Refills: 0 | Status: COMPLETED | OUTPATIENT
Start: 2023-07-13 | End: 2023-07-13

## 2023-07-13 RX ORDER — POTASSIUM PHOSPHATE, MONOBASIC POTASSIUM PHOSPHATE, DIBASIC 236; 224 MG/ML; MG/ML
30 INJECTION, SOLUTION INTRAVENOUS ONCE
Refills: 0 | Status: COMPLETED | OUTPATIENT
Start: 2023-07-13 | End: 2023-07-13

## 2023-07-13 RX ADMIN — Medication 975 MILLIGRAM(S): at 05:58

## 2023-07-13 RX ADMIN — LISINOPRIL 5 MILLIGRAM(S): 2.5 TABLET ORAL at 05:58

## 2023-07-13 RX ADMIN — PIPERACILLIN AND TAZOBACTAM 25 GRAM(S): 4; .5 INJECTION, POWDER, LYOPHILIZED, FOR SOLUTION INTRAVENOUS at 08:02

## 2023-07-13 RX ADMIN — Medication 975 MILLIGRAM(S): at 11:48

## 2023-07-13 RX ADMIN — Medication 400 MILLIGRAM(S): at 05:58

## 2023-07-13 RX ADMIN — Medication 400 MILLIGRAM(S): at 17:47

## 2023-07-13 RX ADMIN — ATORVASTATIN CALCIUM 40 MILLIGRAM(S): 80 TABLET, FILM COATED ORAL at 21:37

## 2023-07-13 RX ADMIN — Medication 25 MILLIGRAM(S): at 05:58

## 2023-07-13 RX ADMIN — ENOXAPARIN SODIUM 40 MILLIGRAM(S): 100 INJECTION SUBCUTANEOUS at 05:58

## 2023-07-13 RX ADMIN — CLOPIDOGREL BISULFATE 75 MILLIGRAM(S): 75 TABLET, FILM COATED ORAL at 11:48

## 2023-07-13 RX ADMIN — Medication 975 MILLIGRAM(S): at 17:47

## 2023-07-13 RX ADMIN — Medication 975 MILLIGRAM(S): at 01:10

## 2023-07-13 RX ADMIN — PIPERACILLIN AND TAZOBACTAM 25 GRAM(S): 4; .5 INJECTION, POWDER, LYOPHILIZED, FOR SOLUTION INTRAVENOUS at 17:45

## 2023-07-13 RX ADMIN — POTASSIUM PHOSPHATE, MONOBASIC POTASSIUM PHOSPHATE, DIBASIC 83.33 MILLIMOLE(S): 236; 224 INJECTION, SOLUTION INTRAVENOUS at 11:49

## 2023-07-13 RX ADMIN — Medication 200 MILLIGRAM(S): at 17:47

## 2023-07-13 RX ADMIN — Medication 100 GRAM(S): at 10:27

## 2023-07-13 RX ADMIN — Medication 30 MILLIGRAM(S): at 11:53

## 2023-07-13 RX ADMIN — Medication 40 MILLIEQUIVALENT(S): at 10:27

## 2023-07-13 RX ADMIN — Medication 400 MILLIGRAM(S): at 01:11

## 2023-07-13 RX ADMIN — PIPERACILLIN AND TAZOBACTAM 25 GRAM(S): 4; .5 INJECTION, POWDER, LYOPHILIZED, FOR SOLUTION INTRAVENOUS at 01:11

## 2023-07-13 RX ADMIN — Medication 200 MILLIGRAM(S): at 05:59

## 2023-07-13 RX ADMIN — Medication 400 MILLIGRAM(S): at 11:48

## 2023-07-13 NOTE — PROGRESS NOTE ADULT - ASSESSMENT
77 y/o female admitted for perforated diverticulitis was afebrile and hemodynamically stable this morning. Stool taken overnight for C. diff rule out and will follow up. Will follow up on morning labs.     PLAN  -pain control  -strict Is/Os  -continue home meds  -trend labs, replete electrolytes as needed  -encourage OOB  -incentive spirometry  -DVT ppx: SCDs, Lovenox 40 daily   -f/u C.diff test

## 2023-07-13 NOTE — CONSULT NOTE ADULT - ASSESSMENT
78 year old M, HTN, HLD, Seizures, CAD with one stent on plavix, h/o UTI's presenting with LLQ abdominal pain since July 5th, no history of diverticulitis, she has a family history of colon cancer in her brother, last colonoscopy was over 30 years ago, denies fever or chills, she has been nauseous but denies vomiting.  No additional complaints.  She was transferred from [facility] for a higher level of care. Admitted for perforated diverticulitis with pneumoperitoneum    Perforated diverticulitis  Pneumoperitoneum  Leukocytosis  Diarrhea    - f/u BCX ngtd  - c diff (-), no further diarrhea as per pt  - still has some RLQ pain, on liquid diet for now  - Continue zosyn for now  - repeat ct/ap if no improvement  - advance diet as tolerated  - will need midline and iv abx at home   - Trend Fever  - Trend Leukocytosis, elevated    d/w team, daughter  Will Follow

## 2023-07-13 NOTE — PROVIDER CONTACT NOTE (CRITICAL VALUE NOTIFICATION) - ASSESSMENT
A&Ox4. No s/s distress noted. No alarms per monitor tech.  Patient reports episodes of diarrhea, r/o c. diff pending results

## 2023-07-13 NOTE — CONSULT NOTE ADULT - SUBJECTIVE AND OBJECTIVE BOX
[facility]                                                INFECTIOUS DISEASES  =======================================================                               Zay Patterson MD#    Jessica Larose MD*                           Christin Blake MD*   Samantha Gallardo MD*            Diplomates American Board of Internal Medicine & Infectious Diseases                  # [place] Office - Appt - Tel  [phone] Fax [phone]                * [place] Office - Appt - Tel [phone] Fax [phone]                                  Hospital Consult line:  [phone]  =======================================================      [id number]  LINDA BOYLE   HPI:  78 year old M, HTN, HLD, Seizures, CAD with one stent on plavix, h/o UTI's presenting with LLQ abdominal pain since July 5th, no history of diverticulitis, she has a family history of colon cancer in her brother, last colonoscopy was over 30 years ago, denies fever or chills, she has been nauseous but denies vomiting.  No additional complaints.  She was transferred from [facility] for a higher level of care.   (09 Jul 2023 07:18)          I have personally reviewed the labs and data; pertinent labs and data are listed in this note; please see below.   =======================================================  Past Medical & Surgical Hx:  =====================  PAST MEDICAL & SURGICAL HISTORY:  No pertinent past medical history      No significant past surgical history        Problem List:  ==========  HEALTH ISSUES - PROBLEM Dx:        Social Hx:  =======  no toxic habits currently    FAMILY HISTORY:  no significant family history of immunosuppressive disorders in mother or father   =======================================================    REVIEW OF SYSTEMS:  CONSTITUTIONAL:  No Fever or chills  HEENT:  No diplopia or blurred vision.  No earache, sore throat or runny nose.  CARDIOVASCULAR:  No pressure, squeezing, strangling, tightness, heaviness or aching about the chest, neck, axilla or epigastrium.  RESPIRATORY:  No cough, shortness of breath  GASTROINTESTINAL:  No nausea, vomiting or diarrhea.  GENITOURINARY:  No dysuria, frequency or urgency. No Blood in urine  MUSCULOSKELETAL:  no joint aches, no muscle pain  SKIN:  No change in skin, hair or nails.  NEUROLOGIC:  No Headaches, seizures or weakness.  PSYCHIATRIC:  No disorder of thought or mood.  ENDOCRINE:  No heat or cold intolerance  HEMATOLOGICAL:  No easy bruising or bleeding.    =======================================================  Allergies    No Known Allergies    Intolerances    Antibiotics:  piperacillin/tazobactam IVPB.. 3.375 Gram(s) IV Intermittent every 8 hours    Other medications:  acetaminophen     Tablet .. 975 milliGRAM(s) Oral every 6 hours  atorvastatin 40 milliGRAM(s) Oral at bedtime  carBAMazepine 200 milliGRAM(s) Oral two times a day  clopidogrel Tablet 75 milliGRAM(s) Oral daily  enoxaparin Injectable 40 milliGRAM(s) SubCutaneous every 24 hours  hydrochlorothiazide 12.5 milliGRAM(s) Oral daily  ibuprofen  Tablet. 400 milliGRAM(s) Oral every 6 hours  lisinopril 5 milliGRAM(s) Oral daily  metoprolol succinate ER 25 milliGRAM(s) Oral daily  PARoxetine 30 milliGRAM(s) Oral daily   piperacillin/tazobactam IVPB.   200 mL/Hr IV Intermittent (07-09-23 @ 09:05)    piperacillin/tazobactam IVPB.-   25 mL/Hr IV Intermittent (07-09-23 @ 11:15)    piperacillin/tazobactam IVPB.-   25 mL/Hr IV Intermittent (07-10-23 @ 01:41)    piperacillin/tazobactam IVPB.-   25 mL/Hr IV Intermittent (07-09-23 @ 18:29)    piperacillin/tazobactam IVPB..   25 mL/Hr IV Intermittent (07-10-23 @ 08:49)   25 mL/Hr IV Intermittent (07-10-23 @ 18:42)   25 mL/Hr IV Intermittent (07-11-23 @ 01:05)   25 mL/Hr IV Intermittent (07-11-23 @ 09:29)   25 mL/Hr IV Intermittent (07-11-23 @ 17:10)   25 mL/Hr IV Intermittent (07-12-23 @ 00:19)   25 mL/Hr IV Intermittent (07-12-23 @ 09:06)   25 mL/Hr IV Intermittent (07-12-23 @ 18:25)   25 mL/Hr IV Intermittent (07-13-23 @ 01:11)   25 mL/Hr IV Intermittent (07-13-23 @ 08:02)      ======================================================  Physical Exam:  ============  T(F): 98.3 (13 Jul 2023 08:30), Max: 98.9 (13 Jul 2023 04:26)  HR: 86 (13 Jul 2023 08:30)  BP: 124/63 (13 Jul 2023 08:30)  RR: 18 (13 Jul 2023 08:30)  SpO2: 97% (13 Jul 2023 08:30) (93% - 99%)  temp max in last 48H T(F): , Max: 98.9 (07-13-23 @ 04:26)    General:  No acute distress.  Eye: Pupils are equal, round and reactive to light, Normal conjunctiva.  HENT: Normocephalic, Oral mucosa is moist, No pharyngeal erythema, No sinus tenderness.  Neck: Supple, No lymphadenopathy.  Respiratory: Lungs are clear to auscultation, Respirations are non-labored.  Cardiovascular: Normal rate, Regular rhythm, s1 + s2  Gastrointestinal: Soft, Non-tender, Non-distended, Normal bowel sounds.  Genitourinary: No costovertebral angle tenderness.  Lymphatics: No lymphadenopathy neck,   Musculoskeletal: Normal range of motion, Normal strength.  Integumentary: No rash.  Neurologic: Alert, Oriented, No focal deficits  Psychiatric: Appropriate mood & affect.    =======================================================  Labs:                        10.3   18.70 )-----------( 450      ( 13 Jul 2023 06:47 )             31.4     07-13    138  |  102  |  9.8  ----------------------------<  123<H>  2.9<LL>   |  23.0  |  0.80    Ca    8.5      13 Jul 2023 06:47  Phos  2.7     07-13  Mg     1.7     07-13        Culture - Urine (collected 07-09-23 @ 07:13)  Source: Catheterized Catheterized  Final Report (07-10-23 @ 14:09):    <10,000 CFU/mL Normal Urogenital Maddie    Culture - Blood (collected 07-09-23 @ 06:00)  Source: .Blood Blood    Culture - Blood (collected 07-09-23 @ 06:00)  Source: .Blood Blood                                                        [facility]                                                INFECTIOUS DISEASES  =======================================================                               Zay Patterson MD#    Jessica Larose MD*                           Christin Blake MD*   Samantha Gallardo MD*            Diplomates American Board of Internal Medicine & Infectious Diseases                  # [place] Office - Appt - Tel  [phone] Fax [phone]                * [place] Office - Appt - Tel [phone] Fax [phone]                                  Hospital Consult line:  [phone]  =======================================================      [id number]  LINDA BOYLE   HPI:  78 year old M, HTN, HLD, Seizures, CAD with one stent on plavix, h/o UTI's presenting with LLQ abdominal pain since July 5th, no history of diverticulitis, she has a family history of colon cancer in her brother, last colonoscopy was over 30 years ago, denies fever or chills, she has been nauseous but denies vomiting.  No additional complaints.  She was transferred from [facility] for a higher level of care.   (09 Jul 2023 07:18)          I have personally reviewed the labs and data; pertinent labs and data are listed in this note; please see below.   =======================================================  Past Medical & Surgical Hx:  =====================  PAST MEDICAL & SURGICAL HISTORY:  No pertinent past medical history      No significant past surgical history        Problem List:  ==========  HEALTH ISSUES - PROBLEM Dx:        Social Hx:  =======  no toxic habits currently    FAMILY HISTORY:  no significant family history of immunosuppressive disorders in mother or father   =======================================================    REVIEW OF SYSTEMS:  CONSTITUTIONAL:  No Fever or chills  HEENT:  No diplopia or blurred vision.  No earache, sore throat or runny nose.  CARDIOVASCULAR:  No pressure, squeezing, strangling, tightness, heaviness or aching about the chest, neck, axilla or epigastrium.  RESPIRATORY:  No cough, shortness of breath  GASTROINTESTINAL:  No nausea, vomiting or diarrhea.  GENITOURINARY:  No dysuria, frequency or urgency. No Blood in urine  MUSCULOSKELETAL:  no joint aches, no muscle pain  SKIN:  No change in skin, hair or nails.  NEUROLOGIC:  No Headaches, seizures or weakness.  PSYCHIATRIC:  No disorder of thought or mood.  ENDOCRINE:  No heat or cold intolerance  HEMATOLOGICAL:  No easy bruising or bleeding.    =======================================================  Allergies    No Known Allergies    Intolerances    Antibiotics:  piperacillin/tazobactam IVPB.. 3.375 Gram(s) IV Intermittent every 8 hours    Other medications:  acetaminophen     Tablet .. 975 milliGRAM(s) Oral every 6 hours  atorvastatin 40 milliGRAM(s) Oral at bedtime  carBAMazepine 200 milliGRAM(s) Oral two times a day  clopidogrel Tablet 75 milliGRAM(s) Oral daily  enoxaparin Injectable 40 milliGRAM(s) SubCutaneous every 24 hours  hydrochlorothiazide 12.5 milliGRAM(s) Oral daily  ibuprofen  Tablet. 400 milliGRAM(s) Oral every 6 hours  lisinopril 5 milliGRAM(s) Oral daily  metoprolol succinate ER 25 milliGRAM(s) Oral daily  PARoxetine 30 milliGRAM(s) Oral daily   piperacillin/tazobactam IVPB.   200 mL/Hr IV Intermittent (07-09-23 @ 09:05)    piperacillin/tazobactam IVPB.-   25 mL/Hr IV Intermittent (07-09-23 @ 11:15)    piperacillin/tazobactam IVPB.-   25 mL/Hr IV Intermittent (07-10-23 @ 01:41)    piperacillin/tazobactam IVPB.-   25 mL/Hr IV Intermittent (07-09-23 @ 18:29)    piperacillin/tazobactam IVPB..   25 mL/Hr IV Intermittent (07-10-23 @ 08:49)   25 mL/Hr IV Intermittent (07-10-23 @ 18:42)   25 mL/Hr IV Intermittent (07-11-23 @ 01:05)   25 mL/Hr IV Intermittent (07-11-23 @ 09:29)   25 mL/Hr IV Intermittent (07-11-23 @ 17:10)   25 mL/Hr IV Intermittent (07-12-23 @ 00:19)   25 mL/Hr IV Intermittent (07-12-23 @ 09:06)   25 mL/Hr IV Intermittent (07-12-23 @ 18:25)   25 mL/Hr IV Intermittent (07-13-23 @ 01:11)   25 mL/Hr IV Intermittent (07-13-23 @ 08:02)      ======================================================  Physical Exam:  ============  T(F): 98.3 (13 Jul 2023 08:30), Max: 98.9 (13 Jul 2023 04:26)  HR: 86 (13 Jul 2023 08:30)  BP: 124/63 (13 Jul 2023 08:30)  RR: 18 (13 Jul 2023 08:30)  SpO2: 97% (13 Jul 2023 08:30) (93% - 99%)  temp max in last 48H T(F): , Max: 98.9 (07-13-23 @ 04:26)    General:  No acute distress.  Eye: Normal conjunctiva.  Neck: Supple, No lymphadenopathy.  Respiratory: Lungs are clear to auscultation, Respirations are non-labored.  Cardiovascular: Normal rate, Regular rhythm, s1 + s2  Gastrointestinal: Soft, rt lq tender, Non-distended, Normal bowel sounds.  Genitourinary: No costovertebral angle tenderness.  Lymphatics: No lymphadenopathy neck,   Musculoskeletal: Normal range of motion, Normal strength.  Integumentary: + b/l Le edema  Neurologic: Alert, Oriented, No focal deficits  Psychiatric: Appropriate mood & affect.    =======================================================  Labs:                        10.3   18.70 )-----------( 450      ( 13 Jul 2023 06:47 )             31.4     07-13    138  |  102  |  9.8  ----------------------------<  123<H>  2.9<LL>   |  23.0  |  0.80    Ca    8.5      13 Jul 2023 06:47  Phos  2.7     07-13  Mg     1.7     07-13        Culture - Urine (collected 07-09-23 @ 07:13)  Source: Catheterized Catheterized  Final Report (07-10-23 @ 14:09):    <10,000 CFU/mL Normal Urogenital Maddie    Culture - Blood (collected 07-09-23 @ 06:00)  Source: .Blood Blood    Culture - Blood (collected 07-09-23 @ 06:00)  Source: .Blood Blood       < from: CT Abdomen and Pelvis w/ Oral Cont and w/ IV Cont (07.11.23 @ 16:45) >  ACC: [id number] EXAM:  CT ABDOMEN AND PELVIS OC IC   ORDERED BY: COLLINS PLUMMER     *** ADDENDUM # 1 ***    MRI is also recommended for further evaluation of the 15 mm pancreatic   uncinate process lesion.    --- End of Report ---    *** END OF ADDENDUM # 1 ***      PROCEDURE DATE:  07/11/2023          INTERPRETATION:  CLINICAL INFORMATION: Possible perforated diverticulitis    COMPARISON: CT chest September 07, 2018    CONTRAST/COMPLICATIONS:  IV Contrast: Omnipaque 350  95 cc administered   5 cc discarded  Oral Contrast: Gastroview  Complications: None reported at time of study completion    PROCEDURE:  CT of the Abdomen and Pelvis was performed.  Sagittal and coronal reformats were performed.    FINDINGS:  LOWER CHEST: Within normal limits.    LIVER: Within normal limits.  BILE DUCTS: Normal caliber.  GALLBLADDER: Cholecystectomy.  SPLEEN: Within normal limits.  PANCREAS: 15 mm indeterminate hypodense lesion in the uncinate process.   No pancreatic ductal dilatation.  ADRENALS: 2.6 cmlow-attenuation left adrenal nodule is increased in   size, measuring 1.8 cm in 2018. 1.7 cm right adrenal nodule is unchanged.  KIDNEYS/URETERS: Within normal limits.    BLADDER: Within normal limits.  REPRODUCTIVE ORGANS: Thickened endometrium, measuring 17 mm. Unremarkable   adnexa.    BOWEL/PERITONEUM: Severe sigmoid diverticulosis. Multiple pockets of   extraluminal gas adjacent to the sigmoid with associated pericolic fat   stranding. Small amount of upper abdominal pneumoperitoneum. Small amount   of ill-defined fluid in the pelvis.  VESSELS: Atherosclerotic changes.  RETROPERITONEUM/LYMPH NODES: No lymphadenopathy.  ABDOMINAL WALL: Within normal limits.  BONES: Left hip arthroplasty.    IMPRESSION:  Pneumoperitoneum secondary to perforated diverticulitis. This finding was   discussed with Dr. Plummer by Dr. Pulido on July 11, 2023, 7:20 PM.    Increase in size of left adrenal nodule compared with 2018.Recommend   nonemergent MRI.    Thickened endometrium. Recommend nonemergent pelvic ultrasound.    --- End of Report ---    ***Please see the addendum at the top of this report. It may contain   additional important information or changes.****        ANDRIY PULIDO MD; Attending Radiologist  This document has been electronically signed. Jul 11 2023  7:23PM  1st Addendum: ANDRIY PULIDO MD; Attending Radiologist [phone]  The first     < end of copied text >

## 2023-07-13 NOTE — CDI QUERY NOTE - NSCDIOTHERTXTBX_GEN_ALL_CORE_HH
Please clarify if high BMI 45.2 supports a clinically significant diagnosis  A.	Morbid obesity with BMI of 45.2  B.	Obesity with BMI of  C.	Other, please specify      ED ADULT Triage Note [Charted Location: [facility] ED] [Authored: 09-Jul-2023 05:50]  Body Measurements:  • 	 used  • Dosing Weight (KILOGRAMS)	127 kg  • Dosing Weight  (POUNDS)	279.9 Pound(s)  • Height (FEET)	5 Feet  • Height (INCHES)	6 Inch(s)  • Height (CENTIMETERS)	167.64 Centimeter(s)  • BSA (m2)	2.31 Meter Squared  • BMI (kG/m2)	45.2

## 2023-07-14 ENCOUNTER — TRANSCRIPTION ENCOUNTER (OUTPATIENT)
Age: 78
End: 2023-07-14

## 2023-07-14 DIAGNOSIS — Z01.810 ENCOUNTER FOR PREPROCEDURAL CARDIOVASCULAR EXAMINATION: ICD-10-CM

## 2023-07-14 LAB
ANION GAP SERPL CALC-SCNC: 13 MMOL/L — SIGNIFICANT CHANGE UP (ref 5–17)
BASOPHILS # BLD AUTO: 0.1 K/UL — SIGNIFICANT CHANGE UP (ref 0–0.2)
BASOPHILS NFR BLD AUTO: 0.5 % — SIGNIFICANT CHANGE UP (ref 0–2)
BUN SERPL-MCNC: 9.1 MG/DL — SIGNIFICANT CHANGE UP (ref 8–20)
CALCIUM SERPL-MCNC: 8.6 MG/DL — SIGNIFICANT CHANGE UP (ref 8.4–10.5)
CHLORIDE SERPL-SCNC: 106 MMOL/L — SIGNIFICANT CHANGE UP (ref 96–108)
CO2 SERPL-SCNC: 24 MMOL/L — SIGNIFICANT CHANGE UP (ref 22–29)
CREAT SERPL-MCNC: 0.76 MG/DL — SIGNIFICANT CHANGE UP (ref 0.5–1.3)
CULTURE RESULTS: SIGNIFICANT CHANGE UP
EGFR: 81 ML/MIN/1.73M2 — SIGNIFICANT CHANGE UP
EOSINOPHIL # BLD AUTO: 0.19 K/UL — SIGNIFICANT CHANGE UP (ref 0–0.5)
EOSINOPHIL NFR BLD AUTO: 0.9 % — SIGNIFICANT CHANGE UP (ref 0–6)
GI PCR PANEL: SIGNIFICANT CHANGE UP
GLUCOSE SERPL-MCNC: 122 MG/DL — HIGH (ref 70–99)
HCT VFR BLD CALC: 31.7 % — LOW (ref 34.5–45)
HGB BLD-MCNC: 10.3 G/DL — LOW (ref 11.5–15.5)
IMM GRANULOCYTES NFR BLD AUTO: 1.5 % — HIGH (ref 0–0.9)
LYMPHOCYTES # BLD AUTO: 0.89 K/UL — LOW (ref 1–3.3)
LYMPHOCYTES # BLD AUTO: 4.3 % — LOW (ref 13–44)
MAGNESIUM SERPL-MCNC: 2.1 MG/DL — SIGNIFICANT CHANGE UP (ref 1.6–2.6)
MCHC RBC-ENTMCNC: 32.3 PG — SIGNIFICANT CHANGE UP (ref 27–34)
MCHC RBC-ENTMCNC: 32.5 GM/DL — SIGNIFICANT CHANGE UP (ref 32–36)
MCV RBC AUTO: 99.4 FL — SIGNIFICANT CHANGE UP (ref 80–100)
MONOCYTES # BLD AUTO: 1.4 K/UL — HIGH (ref 0–0.9)
MONOCYTES NFR BLD AUTO: 6.8 % — SIGNIFICANT CHANGE UP (ref 2–14)
NEUTROPHILS # BLD AUTO: 17.73 K/UL — HIGH (ref 1.8–7.4)
NEUTROPHILS NFR BLD AUTO: 86 % — HIGH (ref 43–77)
PHOSPHATE SERPL-MCNC: 2.9 MG/DL — SIGNIFICANT CHANGE UP (ref 2.4–4.7)
PLATELET # BLD AUTO: 463 K/UL — HIGH (ref 150–400)
POTASSIUM SERPL-MCNC: 3.3 MMOL/L — LOW (ref 3.5–5.3)
POTASSIUM SERPL-SCNC: 3.3 MMOL/L — LOW (ref 3.5–5.3)
RBC # BLD: 3.19 M/UL — LOW (ref 3.8–5.2)
RBC # FLD: 12.1 % — SIGNIFICANT CHANGE UP (ref 10.3–14.5)
SODIUM SERPL-SCNC: 143 MMOL/L — SIGNIFICANT CHANGE UP (ref 135–145)
SPECIMEN SOURCE: SIGNIFICANT CHANGE UP
WBC # BLD: 20.61 K/UL — HIGH (ref 3.8–10.5)
WBC # FLD AUTO: 20.61 K/UL — HIGH (ref 3.8–10.5)

## 2023-07-14 PROCEDURE — 99221 1ST HOSP IP/OBS SF/LOW 40: CPT

## 2023-07-14 PROCEDURE — 93306 TTE W/DOPPLER COMPLETE: CPT | Mod: 26

## 2023-07-14 PROCEDURE — 99232 SBSQ HOSP IP/OBS MODERATE 35: CPT

## 2023-07-14 PROCEDURE — 99233 SBSQ HOSP IP/OBS HIGH 50: CPT

## 2023-07-14 PROCEDURE — 74177 CT ABD & PELVIS W/CONTRAST: CPT | Mod: 26

## 2023-07-14 PROCEDURE — 99222 1ST HOSP IP/OBS MODERATE 55: CPT

## 2023-07-14 RX ORDER — SODIUM CHLORIDE 9 MG/ML
1000 INJECTION, SOLUTION INTRAVENOUS
Refills: 0 | Status: DISCONTINUED | OUTPATIENT
Start: 2023-07-14 | End: 2023-07-15

## 2023-07-14 RX ORDER — FUROSEMIDE 40 MG
20 TABLET ORAL ONCE
Refills: 0 | Status: COMPLETED | OUTPATIENT
Start: 2023-07-14 | End: 2023-07-15

## 2023-07-14 RX ADMIN — Medication 25 MILLIGRAM(S): at 05:39

## 2023-07-14 RX ADMIN — Medication 400 MILLIGRAM(S): at 12:50

## 2023-07-14 RX ADMIN — Medication 975 MILLIGRAM(S): at 18:33

## 2023-07-14 RX ADMIN — PIPERACILLIN AND TAZOBACTAM 25 GRAM(S): 4; .5 INJECTION, POWDER, LYOPHILIZED, FOR SOLUTION INTRAVENOUS at 18:37

## 2023-07-14 RX ADMIN — Medication 30 MILLIGRAM(S): at 11:50

## 2023-07-14 RX ADMIN — Medication 200 MILLIGRAM(S): at 05:41

## 2023-07-14 RX ADMIN — Medication 400 MILLIGRAM(S): at 05:40

## 2023-07-14 RX ADMIN — Medication 975 MILLIGRAM(S): at 00:28

## 2023-07-14 RX ADMIN — Medication 975 MILLIGRAM(S): at 23:22

## 2023-07-14 RX ADMIN — Medication 400 MILLIGRAM(S): at 18:33

## 2023-07-14 RX ADMIN — ATORVASTATIN CALCIUM 40 MILLIGRAM(S): 80 TABLET, FILM COATED ORAL at 23:22

## 2023-07-14 RX ADMIN — Medication 400 MILLIGRAM(S): at 11:50

## 2023-07-14 RX ADMIN — Medication 975 MILLIGRAM(S): at 23:52

## 2023-07-14 RX ADMIN — PIPERACILLIN AND TAZOBACTAM 25 GRAM(S): 4; .5 INJECTION, POWDER, LYOPHILIZED, FOR SOLUTION INTRAVENOUS at 08:31

## 2023-07-14 RX ADMIN — PIPERACILLIN AND TAZOBACTAM 25 GRAM(S): 4; .5 INJECTION, POWDER, LYOPHILIZED, FOR SOLUTION INTRAVENOUS at 00:28

## 2023-07-14 RX ADMIN — CLOPIDOGREL BISULFATE 75 MILLIGRAM(S): 75 TABLET, FILM COATED ORAL at 11:50

## 2023-07-14 RX ADMIN — Medication 200 MILLIGRAM(S): at 18:34

## 2023-07-14 RX ADMIN — Medication 400 MILLIGRAM(S): at 00:28

## 2023-07-14 RX ADMIN — Medication 400 MILLIGRAM(S): at 19:03

## 2023-07-14 RX ADMIN — Medication 975 MILLIGRAM(S): at 19:03

## 2023-07-14 RX ADMIN — ENOXAPARIN SODIUM 40 MILLIGRAM(S): 100 INJECTION SUBCUTANEOUS at 05:46

## 2023-07-14 RX ADMIN — LISINOPRIL 5 MILLIGRAM(S): 2.5 TABLET ORAL at 05:39

## 2023-07-14 RX ADMIN — SODIUM CHLORIDE 100 MILLILITER(S): 9 INJECTION, SOLUTION INTRAVENOUS at 11:53

## 2023-07-14 RX ADMIN — Medication 400 MILLIGRAM(S): at 23:52

## 2023-07-14 RX ADMIN — Medication 975 MILLIGRAM(S): at 12:50

## 2023-07-14 RX ADMIN — Medication 975 MILLIGRAM(S): at 11:50

## 2023-07-14 RX ADMIN — Medication 975 MILLIGRAM(S): at 05:40

## 2023-07-14 RX ADMIN — Medication 400 MILLIGRAM(S): at 23:21

## 2023-07-14 NOTE — CONSULT NOTE ADULT - ASSESSMENT
Patient is a 78 year-old female admitted for perforated diverticulitis and started on conservative management with IV antibiotics, noted with initial improvement  now with uptrending leukocytosis. repeat CT AP with oral and IV cont reviewed by Dr. Daniel, large defect with extravasation of contrast from sigmoid colon, which is not amendable to percutaneous drainage. No role for IR intervention at this time. Primary team made aware, likely will take patient to OR.     Please call extension [phone] with any questions, concerns or issues regarding above.

## 2023-07-14 NOTE — CONSULT NOTE ADULT - PROBLEM SELECTOR RECOMMENDATION 9
-No angina or equivalent  -EKG non ischemic  -Endorses normal NST within last three months  -?valvulopathy, obtaining echo to clarify  -Need emergent surgery, no absolute contraindication    Cardiac Risk Stratification for Procedure  - METS <4  - RCRI Risk Stratification: Class  1 Risk, 6%   Risk of Major Cardiac Event      No active chest pain, evidence of ischemia, decompensated CHF, significant valvular abnormality, or unstable arrhythmia then therefore no absolute cardiac contraindication to the planned surgical procedure.  No further cardiac work up is needed.     Further cardiac work up will not change risk of the patient. Proceed for surgery as indicated.

## 2023-07-14 NOTE — CONSULT NOTE ADULT - SUBJECTIVE AND OBJECTIVE BOX
HPI:  78 year old female with a history of HTN, HLD, Seizures, CAD s/p stent on plavix  was transferred from [facility] to [facility] abdominal pain, WBC of 23k, and CT which showed Acute perforated sigmoid diverticulitis. Patient was started on IV antibiotics and noted to have slow improving in WBC, however not with uptrending leukocytosis. repeat CT ordered and IR consulted for possible abdominal drainage.       ============================================================================  Medications:  Home Medications:  carBAMazepine 200 mg oral tablet: 1 orally 2 times a day (09 Jul 2023 07:41)  hydroCHLOROthiazide 12.5 mg oral tablet: 1 orally once a day (09 Jul 2023 07:38)  lisinopril 5 mg oral tablet: 1 orally once a day (09 Jul 2023 07:38)  PARoxetine 40 mg oral tablet: 1 orally once a day (09 Jul 2023 07:43)  Plavix 75 mg oral tablet: 1 orally once a day (09 Jul 2023 07:42)  rosuvastatin 40 mg oral tablet: 1 orally once a day (09 Jul 2023 07:39)  Toprol-XL 25 mg oral tablet, extended release: 1 orally once a day (09 Jul 2023 07:41)    MEDICATIONS  (STANDING):  acetaminophen     Tablet .. 975 milliGRAM(s) Oral every 6 hours  atorvastatin 40 milliGRAM(s) Oral at bedtime  carBAMazepine 200 milliGRAM(s) Oral two times a day  clopidogrel Tablet 75 milliGRAM(s) Oral daily  enoxaparin Injectable 40 milliGRAM(s) SubCutaneous every 24 hours  hydrochlorothiazide 12.5 milliGRAM(s) Oral daily  ibuprofen  Tablet. 400 milliGRAM(s) Oral every 6 hours  lactated ringers. 1000 milliLiter(s) (100 mL/Hr) IV Continuous <Continuous>  lisinopril 5 milliGRAM(s) Oral daily  metoprolol succinate ER 25 milliGRAM(s) Oral daily  PARoxetine 30 milliGRAM(s) Oral daily  piperacillin/tazobactam IVPB.. 3.375 Gram(s) IV Intermittent every 8 hours    MEDICATIONS  (PRN):  ondansetron Injectable 4 milliGRAM(s) IV Push every 6 hours PRN Nausea      Allergies:   No Known Allergies    ============================================================================  PAST MEDICAL & SURGICAL HISTORY:  No pertinent past medical history      No significant past surgical history          FAMILY HISTORY:      Social History:  Denies (09 Jul 2023 07:18)      ============================================================================  Vitals:  Vital Signs Last 24 Hrs  T(C): 36.8 (14 Jul 2023 14:35), Max: 36.9 (13 Jul 2023 18:58)  T(F): 98.2 (14 Jul 2023 14:35), Max: 98.5 (13 Jul 2023 18:58)  HR: 66 (14 Jul 2023 14:35) (58 - 80)  BP: 120/77 (14 Jul 2023 14:35) (118/75 - 146/80)  BP(mean): --  RR: 17 (14 Jul 2023 14:35) (17 - 18)  SpO2: 98% (14 Jul 2023 14:35) (94% - 98%)    Parameters below as of 14 Jul 2023 14:35  Patient On (Oxygen Delivery Method): room air    Labs:                        10.3   20.61 )-----------( 463      ( 14 Jul 2023 06:25 )             31.7     07-14    143  |  106  |  9.1  ----------------------------<  122<H>  3.3<L>   |  24.0  |  0.76    Ca    8.6      14 Jul 2023 06:25  Phos  2.9     07-14  Mg     2.1     07-14          Imaging:   Pertinent Imaging Reviewed.    ============================================================================

## 2023-07-14 NOTE — CONSULT NOTE ADULT - ASSESSMENT
78y old  Female HTN, HLD, Seizures, CAD 10 years ago, family hx colon cancer, left hip replacement 2022. Presents with perforated diverticulitis and pneumoperitoneum. Cardiology consulted for RCRI. Endorses a normal NST three months ago in outpatient office. Also endorses she has a "leaky valve" unsure which one.  Has chronic COOPER which is unchanged.

## 2023-07-14 NOTE — CHART NOTE - NSCHARTNOTEFT_GEN_A_CORE
Please note, I was present and rounded on this patient throughout the hospitalization and upon further review of the EMR the following diagnosis exists:     1.	Morbid obesity with BMI of 45.2

## 2023-07-14 NOTE — PROGRESS NOTE ADULT - ASSESSMENT
78 year old M, HTN, HLD, Seizures, CAD with one stent on plavix, h/o UTI's presenting with LLQ abdominal pain since July 5th, no history of diverticulitis, she has a family history of colon cancer in her brother, last colonoscopy was over 30 years ago, denies fever or chills, she has been nauseous but denies vomiting.  No additional complaints.  She was transferred from [facility] for a higher level of care. Admitted for perforated diverticulitis with pneumoperitoneum    Perforated diverticulitis  Pneumoperitoneum  Leukocytosis  Diarrhea    - f/u BCX ngtd  - c diff (-  - still has some RLQ pain, reports persistent diarrhea and wbc is uptrending  - Continue zosyn for now  - agree repeat ct/ap   - check stool GI pcr  - now npo  - Trend Fever  - Trend Leukocytosis, elevated      Will Follow       78 year old M, HTN, HLD, Seizures, CAD with one stent on plavix, h/o UTI's presenting with LLQ abdominal pain since July 5th, no history of diverticulitis, she has a family history of colon cancer in her brother, last colonoscopy was over 30 years ago, denies fever or chills, she has been nauseous but denies vomiting.  No additional complaints.  She was transferred from [facility] for a higher level of care. Admitted for perforated diverticulitis with pneumoperitoneum    Perforated diverticulitis  Pneumoperitoneum  Leukocytosis  Diarrhea  Hypokalemia    - f/u BCX ngtd  - c diff (-)  - still has some RLQ pain, reports persistent diarrhea and wbc is uptrending  - Continue zosyn for now  - agree repeat CT/ap   - check stool GI PCR  - now npo  - Trend Fever  - Trend Leukocytosis, elevated  - replace K, likely low from GI losses      Will Follow

## 2023-07-14 NOTE — CONSULT NOTE ADULT - SUBJECTIVE AND OBJECTIVE BOX
[facility]                                              CARDIOLOGY AT [place] [address]                                             Telephone: [phone]. Fax:[phone]                                                       CARDIOLOGY CONSULTATION NOTE                                                                                             History obtained by: Patient and medical record  Community Cardiologist: [facility]   obtained: Yes [  ] No [ x ]  Reason for Consultation: RCRI  Available out pt records reviewed: Yes [x  ] No [  ]    HPI:  Patient is a 78y old  Female HTN, HLD, Seizures, CAD 10 years ago, family hx colon cancer, left hip replacement 2022. Presents with perforated diverticulitis and pneumoperitoneum. Cardiology consulted for RCRI. Endorses a normal NST three months ago in outpatient office. Also endorses she has a "leaky valve" unsure which one.  Has chronic COOPER which is unchanged. Denies ACS symptoms        CARDIAC TESTING   ECHO:    STRESS:    CATH:     ELECTROPHYSIOLOGY:     PAST MEDICAL HISTORY  No pertinent past medical history        PAST SURGICAL HISTORY  No significant past surgical history        SOCIAL HISTORY:  Denies smoking/alcohol/drugs  CIGARETTES:   FORMER  ALCOHOL:  DRUGS:    FAMILY HISTORY:    Family History of Cardiovascular Disease:  Yes [  ] No [x  ]  Coronary Artery Disease in first degree relative: Yes [  ] No [x  ]  Sudden Cardiac Death in First degree relative: Yes [  ] No [ x ]    HOME MEDICATIONS:  carBAMazepine 200 mg oral tablet: 1 orally 2 times a day (09 Jul 2023 07:41)  hydroCHLOROthiazide 12.5 mg oral tablet: 1 orally once a day (09 Jul 2023 07:38)  lisinopril 5 mg oral tablet: 1 orally once a day (09 Jul 2023 07:38)  PARoxetine 40 mg oral tablet: 1 orally once a day (09 Jul 2023 07:43)  Plavix 75 mg oral tablet: 1 orally once a day (09 Jul 2023 07:42)  rosuvastatin 40 mg oral tablet: 1 orally once a day (09 Jul 2023 07:39)  Toprol-XL 25 mg oral tablet, extended release: 1 orally once a day (09 Jul 2023 07:41)      CURRENT CARDIAC MEDICATIONS:  hydrochlorothiazide 12.5 milliGRAM(s) Oral daily  lisinopril 5 milliGRAM(s) Oral daily  metoprolol succinate ER 25 milliGRAM(s) Oral daily      CURRENT OTHER MEDICATIONS:  acetaminophen     Tablet .. 975 milliGRAM(s) Oral every 6 hours  carBAMazepine 200 milliGRAM(s) Oral two times a day  ibuprofen  Tablet. 400 milliGRAM(s) Oral every 6 hours  ondansetron Injectable 4 milliGRAM(s) IV Push every 6 hours PRN Nausea  PARoxetine 30 milliGRAM(s) Oral daily  atorvastatin 40 milliGRAM(s) Oral at bedtime  clopidogrel Tablet 75 milliGRAM(s) Oral daily  enoxaparin Injectable 40 milliGRAM(s) SubCutaneous every 24 hours  lactated ringers. 1000 milliLiter(s) (100 mL/Hr) IV Continuous <Continuous>  piperacillin/tazobactam IVPB.. 3.375 Gram(s) IV Intermittent every 8 hours, Stop order after: 7 Days      ALLERGIES:   No Known Allergies      REVIEW OF SYMPTOMS:   CONSTITUTIONAL: No fever, no chills, no weight loss, no weight gain, no fatigue   ENMT:  No vertigo; No sinus or throat pain  NECK: No pain or stiffness  CARDIOVASCULAR: No chest pain, no dyspnea, no syncope/presyncope, no palpitations, no dizziness, no Orthopnea, no Paroxsymal nocturnal dyspnea  RESPIRATORY: no Shortness of breath, no cough, no wheezing  : No dysuria, no hematuria   GI: No dark color stool, no nausea, +diarrhea, no constipation, +abdominal pain   NEURO: No headache, no slurred speech   MUSCULOSKELETAL: No joint pain or swelling; No muscle, back, or extremity pain  PSYCH: No agitation, no anxiety.    ALL OTHER REVIEW OF SYSTEMS ARE NEGATIVE.    VITAL SIGNS:  T(C): 36.8 (07-14-23 @ 14:35), Max: 36.9 (07-13-23 @ 18:58)  T(F): 98.2 (07-14-23 @ 14:35), Max: 98.5 (07-13-23 @ 18:58)  HR: 66 (07-14-23 @ 14:35) (58 - 80)  BP: 120/77 (07-14-23 @ 14:35) (118/75 - 146/80)  RR: 17 (07-14-23 @ 14:35) (17 - 18)  SpO2: 98% (07-14-23 @ 14:35) (94% - 98%)    INTAKE AND OUTPUT:       PHYSICAL EXAM:  Constitutional: Comfortable . No acute distress.   HEENT: Atraumatic and normocephalic , neck is supple . no JVD. No carotid bruit.  CNS: A&Ox3. No focal deficits.   Respiratory: CTAB, unlabored   Cardiovascular: RRR normal s1 s2. No murmur. No rubs or gallop.  Gastrointestinal: Soft, non-tender. +Bowel sounds.   Extremities: 2+ Peripheral Pulses, No clubbing, cyanosis, +edema  Psychiatric: Calm . no agitation.   Skin: Warm and dry, no ulcers on extremities     LABS:                            10.3   20.61 )-----------( 463      ( 14 Jul 2023 06:25 )             31.7     07-14    143  |  106  |  9.1  ----------------------------<  122<H>  3.3<L>   |  24.0  |  0.76    Ca    8.6      14 Jul 2023 06:25  Phos  2.9     07-14  Mg     2.1     07-14        Urinalysis Basic - ( 14 Jul 2023 06:25 )    Color: x / Appearance: x / SG: x / pH: x  Gluc: 122 mg/dL / Ketone: x  / Bili: x / Urobili: x   Blood: x / Protein: x / Nitrite: x   Leuk Esterase: x / RBC: x / WBC x   Sq Epi: x / Non Sq Epi: x / Bacteria: x              INTERPRETATION OF TELEMETRY: SR    ECG: SR  Prior ECG: Yes [  ] No [x  ]    RADIOLOGY & ADDITIONAL STUDIES:    CT scan:     < from: CT Abdomen and Pelvis w/ Oral Cont and w/ IV Cont (07.11.23 @ 16:45) >  IMPRESSION:  Pneumoperitoneum secondary to perforated diverticulitis. This finding was   discussed with Dr. Pal by Dr. Pulido on July 11, 2023, 7:20 PM.    Increase in size of left adrenal nodule compared with 2018.Recommend   nonemergent MRI.    Thickened endometrium. Recommend nonemergent pelvic ultrasound.    15 mm pancreatic   uncinate process lesion.    < end of copied text >

## 2023-07-15 ENCOUNTER — TRANSCRIPTION ENCOUNTER (OUTPATIENT)
Age: 78
End: 2023-07-15

## 2023-07-15 LAB
ALBUMIN SERPL ELPH-MCNC: 2.7 G/DL — LOW (ref 3.3–5.2)
ALP SERPL-CCNC: 111 U/L — SIGNIFICANT CHANGE UP (ref 40–120)
ALT FLD-CCNC: 33 U/L — HIGH
ANION GAP SERPL CALC-SCNC: 12 MMOL/L — SIGNIFICANT CHANGE UP (ref 5–17)
ANION GAP SERPL CALC-SCNC: 14 MMOL/L — SIGNIFICANT CHANGE UP (ref 5–17)
APTT BLD: 34.6 SEC — SIGNIFICANT CHANGE UP (ref 27.5–35.5)
AST SERPL-CCNC: 37 U/L — HIGH
BASE EXCESS BLDA CALC-SCNC: 0.1 MMOL/L — SIGNIFICANT CHANGE UP (ref -2–3)
BASOPHILS # BLD AUTO: 0 K/UL — SIGNIFICANT CHANGE UP (ref 0–0.2)
BASOPHILS NFR BLD AUTO: 0 % — SIGNIFICANT CHANGE UP (ref 0–2)
BILIRUB SERPL-MCNC: <0.2 MG/DL — LOW (ref 0.4–2)
BLD GP AB SCN SERPL QL: SIGNIFICANT CHANGE UP
BLOOD GAS COMMENTS ARTERIAL: SIGNIFICANT CHANGE UP
BUN SERPL-MCNC: 8.9 MG/DL — SIGNIFICANT CHANGE UP (ref 8–20)
BUN SERPL-MCNC: 9 MG/DL — SIGNIFICANT CHANGE UP (ref 8–20)
CALCIUM SERPL-MCNC: 8.4 MG/DL — SIGNIFICANT CHANGE UP (ref 8.4–10.5)
CALCIUM SERPL-MCNC: 8.6 MG/DL — SIGNIFICANT CHANGE UP (ref 8.4–10.5)
CHLORIDE SERPL-SCNC: 104 MMOL/L — SIGNIFICANT CHANGE UP (ref 96–108)
CHLORIDE SERPL-SCNC: 106 MMOL/L — SIGNIFICANT CHANGE UP (ref 96–108)
CO2 SERPL-SCNC: 23 MMOL/L — SIGNIFICANT CHANGE UP (ref 22–29)
CO2 SERPL-SCNC: 24 MMOL/L — SIGNIFICANT CHANGE UP (ref 22–29)
CREAT SERPL-MCNC: 0.67 MG/DL — SIGNIFICANT CHANGE UP (ref 0.5–1.3)
CREAT SERPL-MCNC: 0.75 MG/DL — SIGNIFICANT CHANGE UP (ref 0.5–1.3)
DIR ANTIGLOB POLYSPECIFIC INTERPRETATION: SIGNIFICANT CHANGE UP
EGFR: 82 ML/MIN/1.73M2 — SIGNIFICANT CHANGE UP
EGFR: 91 ML/MIN/1.73M2 — SIGNIFICANT CHANGE UP
EOSINOPHIL # BLD AUTO: 0 K/UL — SIGNIFICANT CHANGE UP (ref 0–0.5)
EOSINOPHIL NFR BLD AUTO: 0 % — SIGNIFICANT CHANGE UP (ref 0–6)
GAS PNL BLDA: SIGNIFICANT CHANGE UP
GIANT PLATELETS BLD QL SMEAR: PRESENT — SIGNIFICANT CHANGE UP
GLUCOSE SERPL-MCNC: 103 MG/DL — HIGH (ref 70–99)
GLUCOSE SERPL-MCNC: 141 MG/DL — HIGH (ref 70–99)
HCO3 BLDA-SCNC: 26 MMOL/L — SIGNIFICANT CHANGE UP (ref 21–28)
HCT VFR BLD CALC: 26.3 % — LOW (ref 34.5–45)
HCT VFR BLD CALC: 27 % — LOW (ref 34.5–45)
HCT VFR BLD CALC: 32 % — LOW (ref 34.5–45)
HGB BLD-MCNC: 10.3 G/DL — LOW (ref 11.5–15.5)
HGB BLD-MCNC: 8.6 G/DL — LOW (ref 11.5–15.5)
HGB BLD-MCNC: 8.7 G/DL — LOW (ref 11.5–15.5)
HOROWITZ INDEX BLDA+IHG-RTO: SIGNIFICANT CHANGE UP
INR BLD: 1.41 RATIO — HIGH (ref 0.88–1.16)
LACTATE SERPL-SCNC: 2 MMOL/L — SIGNIFICANT CHANGE UP (ref 0.5–2)
LYMPHOCYTES # BLD AUTO: 0.66 K/UL — LOW (ref 1–3.3)
LYMPHOCYTES # BLD AUTO: 2.6 % — LOW (ref 13–44)
MAGNESIUM SERPL-MCNC: 2 MG/DL — SIGNIFICANT CHANGE UP (ref 1.6–2.6)
MANUAL SMEAR VERIFICATION: SIGNIFICANT CHANGE UP
MCHC RBC-ENTMCNC: 32.2 GM/DL — SIGNIFICANT CHANGE UP (ref 32–36)
MCHC RBC-ENTMCNC: 32.2 PG — SIGNIFICANT CHANGE UP (ref 27–34)
MCHC RBC-ENTMCNC: 32.3 PG — SIGNIFICANT CHANGE UP (ref 27–34)
MCHC RBC-ENTMCNC: 32.6 PG — SIGNIFICANT CHANGE UP (ref 27–34)
MCHC RBC-ENTMCNC: 32.7 GM/DL — SIGNIFICANT CHANGE UP (ref 32–36)
MCV RBC AUTO: 100 FL — SIGNIFICANT CHANGE UP (ref 80–100)
MCV RBC AUTO: 100.3 FL — HIGH (ref 80–100)
MCV RBC AUTO: 99.6 FL — SIGNIFICANT CHANGE UP (ref 80–100)
MONOCYTES # BLD AUTO: 1.12 K/UL — HIGH (ref 0–0.9)
MONOCYTES NFR BLD AUTO: 4.4 % — SIGNIFICANT CHANGE UP (ref 2–14)
MYELOCYTES NFR BLD: 1.7 % — HIGH (ref 0–0)
NEUTROPHILS # BLD AUTO: 23.18 K/UL — HIGH (ref 1.8–7.4)
NEUTROPHILS NFR BLD AUTO: 91.3 % — HIGH (ref 43–77)
PCO2 BLDA: 48 MMHG — HIGH (ref 32–45)
PH BLDA: 7.34 — LOW (ref 7.35–7.45)
PHOSPHATE SERPL-MCNC: 2.8 MG/DL — SIGNIFICANT CHANGE UP (ref 2.4–4.7)
PLAT MORPH BLD: NORMAL — SIGNIFICANT CHANGE UP
PLATELET # BLD AUTO: 516 K/UL — HIGH (ref 150–400)
PLATELET # BLD AUTO: 583 K/UL — HIGH (ref 150–400)
PLATELET # BLD AUTO: 629 K/UL — HIGH (ref 150–400)
PO2 BLDA: 98 MMHG — SIGNIFICANT CHANGE UP (ref 83–108)
POTASSIUM SERPL-MCNC: 3.1 MMOL/L — LOW (ref 3.5–5.3)
POTASSIUM SERPL-MCNC: 3.4 MMOL/L — LOW (ref 3.5–5.3)
POTASSIUM SERPL-SCNC: 3.1 MMOL/L — LOW (ref 3.5–5.3)
POTASSIUM SERPL-SCNC: 3.4 MMOL/L — LOW (ref 3.5–5.3)
PROT SERPL-MCNC: 6.6 G/DL — SIGNIFICANT CHANGE UP (ref 6.6–8.7)
PROTHROM AB SERPL-ACNC: 16.4 SEC — HIGH (ref 10.5–13.4)
RBC # BLD: 2.64 M/UL — LOW (ref 3.8–5.2)
RBC # BLD: 2.7 M/UL — LOW (ref 3.8–5.2)
RBC # BLD: 3.19 M/UL — LOW (ref 3.8–5.2)
RBC # FLD: 12.2 % — SIGNIFICANT CHANGE UP (ref 10.3–14.5)
RBC # FLD: 12.4 % — SIGNIFICANT CHANGE UP (ref 10.3–14.5)
RBC # FLD: 12.5 % — SIGNIFICANT CHANGE UP (ref 10.3–14.5)
RBC BLD AUTO: NORMAL — SIGNIFICANT CHANGE UP
SAO2 % BLDA: 99.7 % — HIGH (ref 94–98)
SODIUM SERPL-SCNC: 140 MMOL/L — SIGNIFICANT CHANGE UP (ref 135–145)
SODIUM SERPL-SCNC: 142 MMOL/L — SIGNIFICANT CHANGE UP (ref 135–145)
WBC # BLD: 17.63 K/UL — HIGH (ref 3.8–10.5)
WBC # BLD: 25.39 K/UL — HIGH (ref 3.8–10.5)
WBC # BLD: 28.9 K/UL — HIGH (ref 3.8–10.5)
WBC # FLD AUTO: 17.63 K/UL — HIGH (ref 3.8–10.5)
WBC # FLD AUTO: 25.39 K/UL — HIGH (ref 3.8–10.5)
WBC # FLD AUTO: 28.9 K/UL — HIGH (ref 3.8–10.5)

## 2023-07-15 PROCEDURE — 99222 1ST HOSP IP/OBS MODERATE 55: CPT

## 2023-07-15 PROCEDURE — 93970 EXTREMITY STUDY: CPT | Mod: 26

## 2023-07-15 PROCEDURE — 88307 TISSUE EXAM BY PATHOLOGIST: CPT | Mod: 26

## 2023-07-15 DEVICE — STAPLER COVIDIEN ENDO GIA 60-3.8MM BLUE: Type: IMPLANTABLE DEVICE | Status: FUNCTIONAL

## 2023-07-15 DEVICE — STAPLER COVIDIEN TA 60 BLUE: Type: IMPLANTABLE DEVICE | Status: FUNCTIONAL

## 2023-07-15 DEVICE — STAPLER COVIDIEN ENDO GIA 60-3.8MM BLUE RELOAD: Type: IMPLANTABLE DEVICE | Status: FUNCTIONAL

## 2023-07-15 DEVICE — CLIP APPLIER ETHICON LIGACLIP 11.5" MEDIUM: Type: IMPLANTABLE DEVICE | Status: FUNCTIONAL

## 2023-07-15 RX ORDER — SODIUM CHLORIDE 9 MG/ML
1000 INJECTION, SOLUTION INTRAVENOUS
Refills: 0 | Status: DISCONTINUED | OUTPATIENT
Start: 2023-07-15 | End: 2023-07-17

## 2023-07-15 RX ORDER — SODIUM CHLORIDE 9 MG/ML
1000 INJECTION, SOLUTION INTRAVENOUS ONCE
Refills: 0 | Status: DISCONTINUED | OUTPATIENT
Start: 2023-07-15 | End: 2023-07-15

## 2023-07-15 RX ORDER — NALOXONE HYDROCHLORIDE 4 MG/.1ML
0.1 SPRAY NASAL
Refills: 0 | Status: DISCONTINUED | OUTPATIENT
Start: 2023-07-15 | End: 2023-07-26

## 2023-07-15 RX ORDER — PHENYLEPHRINE HYDROCHLORIDE 10 MG/ML
0.5 INJECTION INTRAVENOUS
Qty: 40 | Refills: 0 | Status: DISCONTINUED | OUTPATIENT
Start: 2023-07-15 | End: 2023-07-17

## 2023-07-15 RX ORDER — HYDROMORPHONE HYDROCHLORIDE 2 MG/ML
30 INJECTION INTRAMUSCULAR; INTRAVENOUS; SUBCUTANEOUS
Refills: 0 | Status: DISCONTINUED | OUTPATIENT
Start: 2023-07-15 | End: 2023-07-17

## 2023-07-15 RX ORDER — METOPROLOL TARTRATE 50 MG
25 TABLET ORAL DAILY
Refills: 0 | Status: DISCONTINUED | OUTPATIENT
Start: 2023-07-15 | End: 2023-07-16

## 2023-07-15 RX ORDER — ONDANSETRON 8 MG/1
4 TABLET, FILM COATED ORAL EVERY 6 HOURS
Refills: 0 | Status: DISCONTINUED | OUTPATIENT
Start: 2023-07-15 | End: 2023-07-26

## 2023-07-15 RX ORDER — SODIUM CHLORIDE 9 MG/ML
1000 INJECTION, SOLUTION INTRAVENOUS
Refills: 0 | Status: DISCONTINUED | OUTPATIENT
Start: 2023-07-15 | End: 2023-07-15

## 2023-07-15 RX ORDER — HYDROMORPHONE HYDROCHLORIDE 2 MG/ML
0.5 INJECTION INTRAMUSCULAR; INTRAVENOUS; SUBCUTANEOUS
Refills: 0 | Status: DISCONTINUED | OUTPATIENT
Start: 2023-07-15 | End: 2023-07-15

## 2023-07-15 RX ORDER — POTASSIUM CHLORIDE 20 MEQ
20 PACKET (EA) ORAL
Refills: 0 | Status: COMPLETED | OUTPATIENT
Start: 2023-07-15 | End: 2023-07-16

## 2023-07-15 RX ORDER — LISINOPRIL 2.5 MG/1
5 TABLET ORAL DAILY
Refills: 0 | Status: DISCONTINUED | OUTPATIENT
Start: 2023-07-15 | End: 2023-07-16

## 2023-07-15 RX ORDER — CARBAMAZEPINE 200 MG
200 TABLET ORAL
Refills: 0 | Status: DISCONTINUED | OUTPATIENT
Start: 2023-07-15 | End: 2023-07-26

## 2023-07-15 RX ORDER — ATORVASTATIN CALCIUM 80 MG/1
40 TABLET, FILM COATED ORAL AT BEDTIME
Refills: 0 | Status: DISCONTINUED | OUTPATIENT
Start: 2023-07-15 | End: 2023-07-26

## 2023-07-15 RX ORDER — ACETAMINOPHEN 500 MG
1000 TABLET ORAL EVERY 6 HOURS
Refills: 0 | Status: COMPLETED | OUTPATIENT
Start: 2023-07-15 | End: 2023-07-16

## 2023-07-15 RX ORDER — PIPERACILLIN AND TAZOBACTAM 4; .5 G/20ML; G/20ML
3.38 INJECTION, POWDER, LYOPHILIZED, FOR SOLUTION INTRAVENOUS EVERY 8 HOURS
Refills: 0 | Status: COMPLETED | OUTPATIENT
Start: 2023-07-15 | End: 2023-07-22

## 2023-07-15 RX ORDER — SODIUM CHLORIDE 9 MG/ML
1000 INJECTION, SOLUTION INTRAVENOUS
Refills: 0 | Status: COMPLETED | OUTPATIENT
Start: 2023-07-15 | End: 2023-07-15

## 2023-07-15 RX ORDER — ACETAMINOPHEN 500 MG
1000 TABLET ORAL ONCE
Refills: 0 | Status: COMPLETED | OUTPATIENT
Start: 2023-07-15 | End: 2023-07-15

## 2023-07-15 RX ORDER — KETOROLAC TROMETHAMINE 30 MG/ML
15 SYRINGE (ML) INJECTION EVERY 6 HOURS
Refills: 0 | Status: DISCONTINUED | OUTPATIENT
Start: 2023-07-15 | End: 2023-07-16

## 2023-07-15 RX ADMIN — SODIUM CHLORIDE 999 MILLILITER(S): 9 INJECTION, SOLUTION INTRAVENOUS at 16:45

## 2023-07-15 RX ADMIN — Medication 20 MILLIGRAM(S): at 05:45

## 2023-07-15 RX ADMIN — Medication 200 MILLIGRAM(S): at 05:45

## 2023-07-15 RX ADMIN — Medication 400 MILLIGRAM(S): at 19:31

## 2023-07-15 RX ADMIN — SODIUM CHLORIDE 75 MILLILITER(S): 9 INJECTION, SOLUTION INTRAVENOUS at 14:23

## 2023-07-15 RX ADMIN — Medication 25 MILLIGRAM(S): at 05:44

## 2023-07-15 RX ADMIN — HYDROMORPHONE HYDROCHLORIDE 30 MILLILITER(S): 2 INJECTION INTRAMUSCULAR; INTRAVENOUS; SUBCUTANEOUS at 17:51

## 2023-07-15 RX ADMIN — SODIUM CHLORIDE 125 MILLILITER(S): 9 INJECTION, SOLUTION INTRAVENOUS at 20:00

## 2023-07-15 RX ADMIN — Medication 400 MILLIGRAM(S): at 05:43

## 2023-07-15 RX ADMIN — Medication 975 MILLIGRAM(S): at 05:43

## 2023-07-15 RX ADMIN — HYDROMORPHONE HYDROCHLORIDE 30 MILLILITER(S): 2 INJECTION INTRAMUSCULAR; INTRAVENOUS; SUBCUTANEOUS at 19:30

## 2023-07-15 RX ADMIN — ATORVASTATIN CALCIUM 40 MILLIGRAM(S): 80 TABLET, FILM COATED ORAL at 22:00

## 2023-07-15 RX ADMIN — Medication 400 MILLIGRAM(S): at 13:56

## 2023-07-15 RX ADMIN — PHENYLEPHRINE HYDROCHLORIDE 23.8 MICROGRAM(S)/KG/MIN: 10 INJECTION INTRAVENOUS at 16:25

## 2023-07-15 RX ADMIN — PIPERACILLIN AND TAZOBACTAM 25 GRAM(S): 4; .5 INJECTION, POWDER, LYOPHILIZED, FOR SOLUTION INTRAVENOUS at 13:00

## 2023-07-15 RX ADMIN — Medication 1000 MILLIGRAM(S): at 14:15

## 2023-07-15 RX ADMIN — Medication 50 MILLIEQUIVALENT(S): at 20:00

## 2023-07-15 RX ADMIN — Medication 30 MILLIGRAM(S): at 20:00

## 2023-07-15 RX ADMIN — LISINOPRIL 5 MILLIGRAM(S): 2.5 TABLET ORAL at 05:45

## 2023-07-15 RX ADMIN — Medication 200 MILLIGRAM(S): at 23:05

## 2023-07-15 RX ADMIN — PIPERACILLIN AND TAZOBACTAM 25 GRAM(S): 4; .5 INJECTION, POWDER, LYOPHILIZED, FOR SOLUTION INTRAVENOUS at 22:00

## 2023-07-15 RX ADMIN — PIPERACILLIN AND TAZOBACTAM 25 GRAM(S): 4; .5 INJECTION, POWDER, LYOPHILIZED, FOR SOLUTION INTRAVENOUS at 04:15

## 2023-07-15 RX ADMIN — HYDROMORPHONE HYDROCHLORIDE 0.5 MILLIGRAM(S): 2 INJECTION INTRAMUSCULAR; INTRAVENOUS; SUBCUTANEOUS at 14:38

## 2023-07-15 RX ADMIN — HYDROMORPHONE HYDROCHLORIDE 0.5 MILLIGRAM(S): 2 INJECTION INTRAMUSCULAR; INTRAVENOUS; SUBCUTANEOUS at 14:23

## 2023-07-15 RX ADMIN — Medication 400 MILLIGRAM(S): at 06:13

## 2023-07-15 RX ADMIN — Medication 975 MILLIGRAM(S): at 06:13

## 2023-07-15 RX ADMIN — HYDROMORPHONE HYDROCHLORIDE 30 MILLILITER(S): 2 INJECTION INTRAMUSCULAR; INTRAVENOUS; SUBCUTANEOUS at 14:29

## 2023-07-15 RX ADMIN — ENOXAPARIN SODIUM 40 MILLIGRAM(S): 100 INJECTION SUBCUTANEOUS at 05:42

## 2023-07-15 RX ADMIN — Medication 1000 MILLIGRAM(S): at 20:00

## 2023-07-15 NOTE — BRIEF OPERATIVE NOTE - NSICDXBRIEFPROCEDURE_GEN_ALL_CORE_FT
PROCEDURES:  Exploratory laparotomy 15-Jul-2023 13:44:02  Hernandez Roldan  Exploratory laparotomy with Mirtha procedure 15-Jul-2023 13:44:25  Hernandez Roldan

## 2023-07-15 NOTE — BRIEF OPERATIVE NOTE - NSICDXBRIEFPREOP_GEN_ALL_CORE_FT
PRE-OP DIAGNOSIS:  Diverticulitis of colon with perforation 15-Jul-2023 13:45:49  Hernandez Roldan

## 2023-07-15 NOTE — CONSULT NOTE ADULT - SUBJECTIVE AND OBJECTIVE BOX
HPI: 77yo F w/ hx of HTN, HLD, seizures, CAD with one stent on plavix presented to [facility] with perforated diverticulitis now s/p Mirtha's procedure. Pt presented on 7/9 complaining of LLQ tenderness. CT scan showed perforated sigmoid diverticulitis without abscess as well as inflammatory changes in adjacent fat likely due to developing fistulization to adjacent small bowel and possibly to uterus. She was admitted to surgery and started on IV Abx, IVF, and made NPO. She underwent repeat CT A/P on 7/14 with rectal and IV contrast that showed extravasation of contrast from sigmoid colon. Scans were reviewed by IR and there was no drainable collection so the decision was made to take the patient to the OR for Mirtha's procedure. Pt had minimal pressor requirements intraoperatively, EBL 50cc. While in PACU systolic BP dropped to 60s and she was started on phenylephrine. Drain output in PACU 125cc serosanguinous fluid. SICU team consulted for possible escalation of care to ICU.       Vitals:  T(C): 36.6 (07-15-23 @ 14:30), Max: 36.8 (07-14-23 @ 22:43)  T(F): 97.9 (07-15-23 @ 14:30), Max: 98.3 (07-14-23 @ 22:43)  HR: 74 (07-15-23 @ 16:45) (66 - 83)  BP: 97/59 (07-15-23 @ 16:03) (68/29 - 158/77)  ABP: 117/54 (07-15-23 @ 16:45) (99/49 - 117/54)  ABP(mean): 74 (07-15-23 @ 16:45) (65 - 83)  RR: 12 (07-15-23 @ 16:45) (12 - 20)  SpO2: 98% (07-15-23 @ 16:45) (94% - 98%)      Labs:    LABS:  cret                        8.7    25.39 )-----------( 583      ( 15 Jul 2023 16:20 )             27.0     07-15    142  |  104  |  9.0  ----------------------------<  103<H>  3.4<L>   |  24.0  |  0.67    Ca    8.6      15 Jul 2023 08:39  Phos  2.8     07-15  Mg     2.0     07-15    TPro  6.6  /  Alb  2.7<L>  /  TBili  <0.2<L>  /  DBili  x   /  AST  37<H>  /  ALT  33<H>  /  AlkPhos  111  07-15    PT/INR - ( 15 Jul 2023 08:39 )   PT: 16.4 sec;   INR: 1.41 ratio         PTT - ( 15 Jul 2023 08:39 )  PTT:34.6 sec    GENERAL: NAD, AAOx3, slightly lethargic   HEAD:  Atraumatic, normocephalic  EYES: EOMI, PERRLA, conjunctiva and sclera clear  ENT: Moist mucous membranes  NECK: Supple, no JVD  HEART: Regular rate and rhythm, no murmurs, rubs, or gallops  LUNGS: Unlabored respirations.  Clear to auscultation bilaterally, no crackles, wheezing, or rhonchi  ABDOMEN: Soft, nontender, nondistended, +BS  EXTREMITIES: 2+ peripheral pulses bilaterally. No clubbing, cyanosis, or edema  NERVOUS SYSTEM:  A&Ox3, no focal deficits   SKIN: No rashes or lesions   HPI: 77yo F w/ hx of HTN, HLD, seizures, CAD with one stent on plavix presented to [facility] with perforated diverticulitis now s/p Mirtha's procedure. Pt presented on 7/9 complaining of LLQ tenderness. CT scan showed perforated sigmoid diverticulitis without abscess as well as inflammatory changes in adjacent fat likely due to developing fistulization to adjacent small bowel and possibly to uterus. She was admitted to surgery and started on IV Abx, IVF, and made NPO. She underwent repeat CT A/P on 7/14 with rectal and IV contrast that showed extravasation of contrast from sigmoid colon. Scans were reviewed by IR and there was no drainable collection so the decision was made to take the patient to the OR for Mirtha's procedure. Pt had minimal pressor requirements intraoperatively, EBL 50cc. While in PACU systolic BP dropped to 60s and she was started on phenylephrine. Drain output in PACU 125cc serosanguinous fluid. SICU team consulted for possible escalation of care to ICU.       Vitals:  T(C): 36.6 (07-15-23 @ 14:30), Max: 36.8 (07-14-23 @ 22:43)  T(F): 97.9 (07-15-23 @ 14:30), Max: 98.3 (07-14-23 @ 22:43)  HR: 74 (07-15-23 @ 16:45) (66 - 83)  BP: 97/59 (07-15-23 @ 16:03) (68/29 - 158/77)  ABP: 117/54 (07-15-23 @ 16:45) (99/49 - 117/54)  ABP(mean): 74 (07-15-23 @ 16:45) (65 - 83)  RR: 12 (07-15-23 @ 16:45) (12 - 20)  SpO2: 98% (07-15-23 @ 16:45) (94% - 98%)      Labs:    LABS:  cret                        8.7    25.39 )-----------( 583      ( 15 Jul 2023 16:20 )             27.0     07-15    142  |  104  |  9.0  ----------------------------<  103<H>  3.4<L>   |  24.0  |  0.67    Ca    8.6      15 Jul 2023 08:39  Phos  2.8     07-15  Mg     2.0     07-15    TPro  6.6  /  Alb  2.7<L>  /  TBili  <0.2<L>  /  DBili  x   /  AST  37<H>  /  ALT  33<H>  /  AlkPhos  111  07-15    PT/INR - ( 15 Jul 2023 08:39 )   PT: 16.4 sec;   INR: 1.41 ratio         PTT - ( 15 Jul 2023 08:39 )  PTT:34.6 sec    GENERAL: NAD, AAOx3. Pleasant and conversive.   HEAD:  Atraumatic, normocephalic  EYES: EOMI, PERRLA, conjunctiva and sclera clear  ENT: Moist mucous membranes  NECK: Supple, no JVD  HEART: RRR  LUNGS: Unlabored respirations, no conversational dyspnea  ABDOMEN: Soft, nondistended, mildly tender to palpation. Ostomy present with large clot in bag. LEV drain with serosanguinous fluid.   EXTREMITIES: 2+ pitting edema bilaterally   NERVOUS SYSTEM:  A&Ox3, no focal deficits   SKIN: No rashes or lesions

## 2023-07-15 NOTE — BRIEF OPERATIVE NOTE - OPERATION/FINDINGS
Pelvic abscess with feculent peritonitis.  Cultures sent  Fuentes's procedure was performed  Fascia closed   Abdominal wound packed with kerlex

## 2023-07-15 NOTE — BRIEF OPERATIVE NOTE - NSICDXBRIEFPOSTOP_GEN_ALL_CORE_FT
POST-OP DIAGNOSIS:  Diverticulitis of colon with perforation 15-Jul-2023 13:46:06  Hernandez Roldan

## 2023-07-15 NOTE — CONSULT NOTE ADULT - ASSESSMENT
77yo F presented with perforated diverticulitis now s/p Mirtha's procedure complicated by postoperative hypotension in PACU requiring pressors. Will admit to SICU for further management.     Neuro  - AAOx3  - pain control: dilaudid PCA, tylenol, toradol   - carbamazepine seizure ppx     Pulm  - 3L NC    Cardiac  - phenylephrine gtt for BP support  - holding home BP meds in setting of hemodynamic instability   -  77yo F presented with perforated diverticulitis now s/p Mirtha's procedure complicated by postoperative hypotension in PACU requiring pressors. Will admit to SICU for further management.     Neuro  - AAOx3  - pain control: dilaudid PCA, tylenol, toradol   - carbamazepine seizure ppx     Pulm  - 3L NC    Cardiac  - phenylephrine gtt for BP support  - holding home BP meds in setting of hemodynamic instability     GI:  - monitor ostomy output     Diet/IVF: NPO, plasmalyte 125cc/hr    :   - robison, strict i/o  - IVF    Endo: n/a    Heme/DVT:   - lovenox 40mg daily   - SCDs  - trend Hgb  - f/u LEV drain output     ID:  - continue zosyn   - f/u OR cultures    Lines/tubes: LEV drain, ostomy, PIV x2, a-line  77yo F presented with perforated diverticulitis now s/p Mirtha's procedure complicated by postoperative hypotension in PACU requiring pressors. Will admit to SICU for further management.     Neuro  - AAOx3  - pain control: dilaudid PCA, tylenol, toradol   - carbamazepine seizure ppx     Pulm  - 3L NC    Cardiac  - phenylephrine gtt for BP support target MAP > 65  - holding home BP meds in setting of hemodynamic instability     GI:  - monitor ostomy output     Diet/IVF: NPO, plasmalyte 125cc/hr    :   - robison, strict i/o  - IVF    Endo: n/a    Heme/DVT:   - lovenox 40mg daily   - SCDs  - trend Hgb  - f/u LEV drain output     ID:  - continue zosyn   - f/u OR cultures    Lines/tubes: LEV drain, ostomy, PIV x2, a-line

## 2023-07-15 NOTE — CHART NOTE - NSCHARTNOTEFT_GEN_A_CORE
Patient is a     Patient seen and examined at bedside after being notified about hypotension.     Vitals:  Vital Signs Last 24 Hrs  T(C): 36.6 (15 Jul 2023 14:30), Max: 36.8 (14 Jul 2023 22:43)  T(F): 97.9 (15 Jul 2023 14:30), Max: 98.3 (14 Jul 2023 22:43)  HR: 72 (15 Jul 2023 16:30) (66 - 83)  BP: 97/59 (15 Jul 2023 16:03) (68/29 - 158/77)  BP(mean): 71 (15 Jul 2023 16:03) (36 - 87)  RR: 12 (15 Jul 2023 16:30) (12 - 20)  SpO2: 97% (15 Jul 2023 16:15) (94% - 98%)    Parameters below as of 15 Jul 2023 16:30    O2 Flow (L/min): 3    Labs:                          10.3   17.63 )-----------( 516      ( 15 Jul 2023 08:39 )             32.0     07-15    142  |  104  |  9.0  ----------------------------<  103<H>  3.4<L>   |  24.0  |  0.67    Ca    8.6      15 Jul 2023 08:39  Phos  2.8     07-15  Mg     2.0     07-15    TPro  6.6  /  Alb  2.7<L>  /  TBili  <0.2<L>  /  DBili  x   /  AST  37<H>  /  ALT  33<H>  /  AlkPhos  111  07-15        Exam:  Gen: pt lying in bed, alert, in NAD  Resp: unlabored  CVS: RRR  Abd: soft, NT, ND  Ext: moving all extremities spontaneously, sensation intact, pulses 2+ Patient is a 77 y/o female who presented with perforated diverticulitis and pneumoperitoneum. She is POD0 from exlap and Mirtha's.     Patient seen and examined at bedside after being notified about hypotension. Her lowest pressure was 68/29, however she was mentating normally with otherwise normal vitals during this episode. She received a 2L bolus with little improvement of her BP. The decision was made at that time to begin vasoactive medications and she received several doses of phenylephrine. Simultaneously a right radial a-line was placed. She was placed on a phenylephrine drip at 0.05 and the SICU was consulted. Her pressures responded well to the vasopressors and she continued to have a reassuring exam with normal mentation. She did receive her daily beta blocker preoperatively.     Vitals:  Vital Signs Last 24 Hrs  T(C): 36.6 (15 Jul 2023 14:30), Max: 36.8 (14 Jul 2023 22:43)  T(F): 97.9 (15 Jul 2023 14:30), Max: 98.3 (14 Jul 2023 22:43)  HR: 72 (15 Jul 2023 16:30) (66 - 83)  BP: 97/59 (15 Jul 2023 16:03) (68/29 - 158/77)  BP(mean): 71 (15 Jul 2023 16:03) (36 - 87)  RR: 12 (15 Jul 2023 16:30) (12 - 20)  SpO2: 97% (15 Jul 2023 16:15) (94% - 98%)    Parameters below as of 15 Jul 2023 16:30    O2 Flow (L/min): 3    Labs:                          10.3   17.63 )-----------( 516      ( 15 Jul 2023 08:39 )             32.0     07-15    142  |  104  |  9.0  ----------------------------<  103<H>  3.4<L>   |  24.0  |  0.67    Ca    8.6      15 Jul 2023 08:39  Phos  2.8     07-15  Mg     2.0     07-15    TPro  6.6  /  Alb  2.7<L>  /  TBili  <0.2<L>  /  DBili  x   /  AST  37<H>  /  ALT  33<H>  /  AlkPhos  111  07-15    ABG - ( 15 Jul 2023 16:33 )  pH, Arterial: 7.340 pH, Blood: x     /  pCO2: 48    /  pO2: 98    / HCO3: 26    / Base Excess: 0.1   /  SaO2: 99.7          Exam:  Gen: pt lying in bed, drowsy but easily arousable.   Resp: unlabored, satting 97% on 3L  CVS: RRR  Abd: soft, mildly distended, with appropriate perincisional ttp. Drain with serosang output.   Ext: moving all extremities spontaneously, sensation intact, pulses 2+    Assessment: 77 y/o female with perforated diverticulitis now POD0 from ex-lap and Mirtha's who developed post-operative hypotension resistant to fluids, requiring vasopressors.    Plan:  -Admit to SICU  -Titrate phenylephrine drip to MAP >65  -Serial abdominal exams  -Monitor drain output and quality  -Continue Zosyn  -pain control  -strict Is/Os  -trend labs, replete electrolytes as needed  -encourage OOB  -incentive spirometry  -DVT ppx: SCDs, Lovenox 40 daily to be restarted when appropriate.  -Remainder of care per SICU Patient is a 77 y/o female who presented with perforated diverticulitis and pneumoperitoneum. She is POD0 from exlap and Mirtha's.     Patient seen and examined at bedside after being notified about hypotension. Her lowest pressure was 68/29, however she was mentating normally with otherwise normal vitals during this episode. She received a 2L bolus with little improvement of her BP. The decision was made at that time to begin vasoactive medications and she received several doses of phenylephrine. Simultaneously a right radial a-line was placed. She was placed on a phenylephrine drip at 0.05 and the SICU was consulted. Her pressures responded well to the vasopressors and she continued to have a reassuring exam with normal mentation. She did receive her daily beta blocker preoperatively.     Vitals:  Vital Signs Last 24 Hrs  T(C): 36.6 (15 Jul 2023 14:30), Max: 36.8 (14 Jul 2023 22:43)  T(F): 97.9 (15 Jul 2023 14:30), Max: 98.3 (14 Jul 2023 22:43)  HR: 72 (15 Jul 2023 16:30) (66 - 83)  BP: 97/59 (15 Jul 2023 16:03) (68/29 - 158/77)  BP(mean): 71 (15 Jul 2023 16:03) (36 - 87)  RR: 12 (15 Jul 2023 16:30) (12 - 20)  SpO2: 97% (15 Jul 2023 16:15) (94% - 98%)    Parameters below as of 15 Jul 2023 16:30    O2 Flow (L/min): 3    Labs:                          10.3   17.63 )-----------( 516      ( 15 Jul 2023 08:39 )             32.0     07-15    142  |  104  |  9.0  ----------------------------<  103<H>  3.4<L>   |  24.0  |  0.67    Ca    8.6      15 Jul 2023 08:39  Phos  2.8     07-15  Mg     2.0     07-15    TPro  6.6  /  Alb  2.7<L>  /  TBili  <0.2<L>  /  DBili  x   /  AST  37<H>  /  ALT  33<H>  /  AlkPhos  111  07-15    ABG - ( 15 Jul 2023 16:33 )  pH, Arterial: 7.340 pH, Blood: x     /  pCO2: 48    /  pO2: 98    / HCO3: 26    / Base Excess: 0.1   /  SaO2: 99.7          Exam:  Gen: pt lying in bed, drowsy but easily arousable.   Resp: unlabored, satting 97% on 3L  CVS: RRR  Abd: soft, mildly distended, with appropriate perincisional ttp. Drain with serosang output.   Ext: moving all extremities spontaneously, sensation intact, pulses 2+    Assessment: 77 y/o female with perforated diverticulitis now POD0 from ex-lap and Mirtha's who developed post-operative hypotension resistant to fluids, requiring vasopressors.    Plan:  -Upgrade to SICU  -Titrate phenylephrine drip to MAP >65  -Serial abdominal exams  -Monitor drain output and quality  -Continue Zosyn  -pain control  -strict Is/Os  -trend labs, replete electrolytes as needed  -encourage OOB  -incentive spirometry  -DVT ppx: SCDs, Lovenox 40 daily to be restarted when appropriate.  -Remainder of care per SICU

## 2023-07-15 NOTE — PROGRESS NOTE ADULT - ASSESSMENT
A: 78 year old M, HTN, HLD, Seizures, CAD with one stent on plavix admitted with perforated diverticulitis with pneumoperitoneum    P:  OR today for ex. lap, hooker's procedure  Keep NPO  IVF  IV Abx  F/U AM labs

## 2023-07-16 LAB
ANION GAP SERPL CALC-SCNC: 10 MMOL/L — SIGNIFICANT CHANGE UP (ref 5–17)
ANION GAP SERPL CALC-SCNC: 12 MMOL/L — SIGNIFICANT CHANGE UP (ref 5–17)
BASOPHILS # BLD AUTO: 0.05 K/UL — SIGNIFICANT CHANGE UP (ref 0–0.2)
BASOPHILS NFR BLD AUTO: 0.2 % — SIGNIFICANT CHANGE UP (ref 0–2)
BUN SERPL-MCNC: 11.2 MG/DL — SIGNIFICANT CHANGE UP (ref 8–20)
BUN SERPL-MCNC: 11.9 MG/DL — SIGNIFICANT CHANGE UP (ref 8–20)
CALCIUM SERPL-MCNC: 7.4 MG/DL — LOW (ref 8.4–10.5)
CALCIUM SERPL-MCNC: 8 MG/DL — LOW (ref 8.4–10.5)
CHLORIDE SERPL-SCNC: 104 MMOL/L — SIGNIFICANT CHANGE UP (ref 96–108)
CHLORIDE SERPL-SCNC: 106 MMOL/L — SIGNIFICANT CHANGE UP (ref 96–108)
CO2 SERPL-SCNC: 25 MMOL/L — SIGNIFICANT CHANGE UP (ref 22–29)
CREAT SERPL-MCNC: 0.69 MG/DL — SIGNIFICANT CHANGE UP (ref 0.5–1.3)
CREAT SERPL-MCNC: 0.77 MG/DL — SIGNIFICANT CHANGE UP (ref 0.5–1.3)
EGFR: 80 ML/MIN/1.73M2 — SIGNIFICANT CHANGE UP
EGFR: 90 ML/MIN/1.73M2 — SIGNIFICANT CHANGE UP
EOSINOPHIL # BLD AUTO: 0 K/UL — SIGNIFICANT CHANGE UP (ref 0–0.5)
EOSINOPHIL NFR BLD AUTO: 0 % — SIGNIFICANT CHANGE UP (ref 0–6)
GLUCOSE SERPL-MCNC: 140 MG/DL — HIGH (ref 70–99)
GLUCOSE SERPL-MCNC: 166 MG/DL — HIGH (ref 70–99)
HCT VFR BLD CALC: 21.1 % — LOW (ref 34.5–45)
HCT VFR BLD CALC: 22.1 % — LOW (ref 34.5–45)
HCT VFR BLD CALC: 24 % — LOW (ref 34.5–45)
HGB BLD-MCNC: 6.9 G/DL — CRITICAL LOW (ref 11.5–15.5)
HGB BLD-MCNC: 7.1 G/DL — LOW (ref 11.5–15.5)
HGB BLD-MCNC: 7.7 G/DL — LOW (ref 11.5–15.5)
IMM GRANULOCYTES NFR BLD AUTO: 1.8 % — HIGH (ref 0–0.9)
LYMPHOCYTES # BLD AUTO: 0.88 K/UL — LOW (ref 1–3.3)
LYMPHOCYTES # BLD AUTO: 3.9 % — LOW (ref 13–44)
MAGNESIUM SERPL-MCNC: 1.9 MG/DL — SIGNIFICANT CHANGE UP (ref 1.6–2.6)
MCHC RBC-ENTMCNC: 31.3 PG — SIGNIFICANT CHANGE UP (ref 27–34)
MCHC RBC-ENTMCNC: 32 PG — SIGNIFICANT CHANGE UP (ref 27–34)
MCHC RBC-ENTMCNC: 32.1 GM/DL — SIGNIFICANT CHANGE UP (ref 32–36)
MCHC RBC-ENTMCNC: 32.4 PG — SIGNIFICANT CHANGE UP (ref 27–34)
MCHC RBC-ENTMCNC: 32.7 GM/DL — SIGNIFICANT CHANGE UP (ref 32–36)
MCV RBC AUTO: 97.4 FL — SIGNIFICANT CHANGE UP (ref 80–100)
MCV RBC AUTO: 99.1 FL — SIGNIFICANT CHANGE UP (ref 80–100)
MCV RBC AUTO: 99.6 FL — SIGNIFICANT CHANGE UP (ref 80–100)
MONOCYTES # BLD AUTO: 1.1 K/UL — HIGH (ref 0–0.9)
MONOCYTES NFR BLD AUTO: 4.9 % — SIGNIFICANT CHANGE UP (ref 2–14)
NEUTROPHILS # BLD AUTO: 20.04 K/UL — HIGH (ref 1.8–7.4)
NEUTROPHILS NFR BLD AUTO: 89.2 % — HIGH (ref 43–77)
PHOSPHATE SERPL-MCNC: 3.2 MG/DL — SIGNIFICANT CHANGE UP (ref 2.4–4.7)
PHOSPHATE SERPL-MCNC: 4.4 MG/DL — SIGNIFICANT CHANGE UP (ref 2.4–4.7)
PLATELET # BLD AUTO: 494 K/UL — HIGH (ref 150–400)
PLATELET # BLD AUTO: 522 K/UL — HIGH (ref 150–400)
PLATELET # BLD AUTO: 544 K/UL — HIGH (ref 150–400)
POTASSIUM SERPL-MCNC: 3.1 MMOL/L — LOW (ref 3.5–5.3)
POTASSIUM SERPL-MCNC: 3.3 MMOL/L — LOW (ref 3.5–5.3)
POTASSIUM SERPL-SCNC: 3.1 MMOL/L — LOW (ref 3.5–5.3)
POTASSIUM SERPL-SCNC: 3.3 MMOL/L — LOW (ref 3.5–5.3)
RBC # BLD: 2.13 M/UL — LOW (ref 3.8–5.2)
RBC # BLD: 2.27 M/UL — LOW (ref 3.8–5.2)
RBC # BLD: 2.41 M/UL — LOW (ref 3.8–5.2)
RBC # FLD: 12.6 % — SIGNIFICANT CHANGE UP (ref 10.3–14.5)
RBC # FLD: 12.7 % — SIGNIFICANT CHANGE UP (ref 10.3–14.5)
RBC # FLD: 15.7 % — HIGH (ref 10.3–14.5)
SODIUM SERPL-SCNC: 139 MMOL/L — SIGNIFICANT CHANGE UP (ref 135–145)
SODIUM SERPL-SCNC: 143 MMOL/L — SIGNIFICANT CHANGE UP (ref 135–145)
WBC # BLD: 22.15 K/UL — HIGH (ref 3.8–10.5)
WBC # BLD: 22.24 K/UL — HIGH (ref 3.8–10.5)
WBC # BLD: 22.48 K/UL — HIGH (ref 3.8–10.5)
WBC # FLD AUTO: 22.15 K/UL — HIGH (ref 3.8–10.5)
WBC # FLD AUTO: 22.24 K/UL — HIGH (ref 3.8–10.5)
WBC # FLD AUTO: 22.48 K/UL — HIGH (ref 3.8–10.5)

## 2023-07-16 PROCEDURE — 99291 CRITICAL CARE FIRST HOUR: CPT

## 2023-07-16 RX ORDER — ACETAMINOPHEN 500 MG
1000 TABLET ORAL EVERY 6 HOURS
Refills: 0 | Status: COMPLETED | OUTPATIENT
Start: 2023-07-16 | End: 2023-07-17

## 2023-07-16 RX ORDER — POTASSIUM CHLORIDE 20 MEQ
40 PACKET (EA) ORAL ONCE
Refills: 0 | Status: COMPLETED | OUTPATIENT
Start: 2023-07-16 | End: 2023-07-16

## 2023-07-16 RX ORDER — HYDROCORTISONE 20 MG
100 TABLET ORAL ONCE
Refills: 0 | Status: COMPLETED | OUTPATIENT
Start: 2023-07-16 | End: 2023-07-16

## 2023-07-16 RX ORDER — NOREPINEPHRINE BITARTRATE/D5W 8 MG/250ML
0.05 PLASTIC BAG, INJECTION (ML) INTRAVENOUS
Qty: 8 | Refills: 0 | Status: DISCONTINUED | OUTPATIENT
Start: 2023-07-16 | End: 2023-07-17

## 2023-07-16 RX ORDER — POTASSIUM CHLORIDE 20 MEQ
40 PACKET (EA) ORAL EVERY 4 HOURS
Refills: 0 | Status: COMPLETED | OUTPATIENT
Start: 2023-07-16 | End: 2023-07-16

## 2023-07-16 RX ORDER — MAGNESIUM SULFATE 500 MG/ML
2 VIAL (ML) INJECTION ONCE
Refills: 0 | Status: COMPLETED | OUTPATIENT
Start: 2023-07-16 | End: 2023-07-16

## 2023-07-16 RX ORDER — CALCIUM GLUCONATE 100 MG/ML
2 VIAL (ML) INTRAVENOUS ONCE
Refills: 0 | Status: COMPLETED | OUTPATIENT
Start: 2023-07-16 | End: 2023-07-16

## 2023-07-16 RX ORDER — SODIUM CHLORIDE 9 MG/ML
1000 INJECTION, SOLUTION INTRAVENOUS ONCE
Refills: 0 | Status: COMPLETED | OUTPATIENT
Start: 2023-07-16 | End: 2023-07-16

## 2023-07-16 RX ADMIN — Medication 40 MILLIEQUIVALENT(S): at 06:50

## 2023-07-16 RX ADMIN — Medication 400 MILLIGRAM(S): at 22:59

## 2023-07-16 RX ADMIN — Medication 400 MILLIGRAM(S): at 00:00

## 2023-07-16 RX ADMIN — Medication 200 MILLIGRAM(S): at 17:17

## 2023-07-16 RX ADMIN — SODIUM CHLORIDE 2000 MILLILITER(S): 9 INJECTION, SOLUTION INTRAVENOUS at 10:30

## 2023-07-16 RX ADMIN — ENOXAPARIN SODIUM 40 MILLIGRAM(S): 100 INJECTION SUBCUTANEOUS at 06:55

## 2023-07-16 RX ADMIN — ATORVASTATIN CALCIUM 40 MILLIGRAM(S): 80 TABLET, FILM COATED ORAL at 22:59

## 2023-07-16 RX ADMIN — SODIUM CHLORIDE 125 MILLILITER(S): 9 INJECTION, SOLUTION INTRAVENOUS at 23:56

## 2023-07-16 RX ADMIN — Medication 11.9 MICROGRAM(S)/KG/MIN: at 18:52

## 2023-07-16 RX ADMIN — ONDANSETRON 4 MILLIGRAM(S): 8 TABLET, FILM COATED ORAL at 07:52

## 2023-07-16 RX ADMIN — Medication 1000 MILLIGRAM(S): at 00:30

## 2023-07-16 RX ADMIN — Medication 400 MILLIGRAM(S): at 11:24

## 2023-07-16 RX ADMIN — Medication 30 MILLIGRAM(S): at 12:59

## 2023-07-16 RX ADMIN — PHENYLEPHRINE HYDROCHLORIDE 23.8 MICROGRAM(S)/KG/MIN: 10 INJECTION INTRAVENOUS at 15:00

## 2023-07-16 RX ADMIN — Medication 100 MILLIGRAM(S): at 18:32

## 2023-07-16 RX ADMIN — Medication 200 GRAM(S): at 22:59

## 2023-07-16 RX ADMIN — Medication 1000 MILLIGRAM(S): at 11:54

## 2023-07-16 RX ADMIN — Medication 1000 MILLIGRAM(S): at 23:14

## 2023-07-16 RX ADMIN — HYDROMORPHONE HYDROCHLORIDE 30 MILLILITER(S): 2 INJECTION INTRAMUSCULAR; INTRAVENOUS; SUBCUTANEOUS at 07:23

## 2023-07-16 RX ADMIN — Medication 400 MILLIGRAM(S): at 06:51

## 2023-07-16 RX ADMIN — Medication 1000 MILLIGRAM(S): at 07:06

## 2023-07-16 RX ADMIN — PIPERACILLIN AND TAZOBACTAM 25 GRAM(S): 4; .5 INJECTION, POWDER, LYOPHILIZED, FOR SOLUTION INTRAVENOUS at 13:01

## 2023-07-16 RX ADMIN — PIPERACILLIN AND TAZOBACTAM 25 GRAM(S): 4; .5 INJECTION, POWDER, LYOPHILIZED, FOR SOLUTION INTRAVENOUS at 22:56

## 2023-07-16 RX ADMIN — Medication 25 GRAM(S): at 13:00

## 2023-07-16 RX ADMIN — HYDROMORPHONE HYDROCHLORIDE 30 MILLILITER(S): 2 INJECTION INTRAMUSCULAR; INTRAVENOUS; SUBCUTANEOUS at 00:00

## 2023-07-16 RX ADMIN — Medication 200 MILLIGRAM(S): at 06:52

## 2023-07-16 RX ADMIN — Medication 40 MILLIEQUIVALENT(S): at 17:17

## 2023-07-16 RX ADMIN — PIPERACILLIN AND TAZOBACTAM 25 GRAM(S): 4; .5 INJECTION, POWDER, LYOPHILIZED, FOR SOLUTION INTRAVENOUS at 06:50

## 2023-07-16 RX ADMIN — HYDROMORPHONE HYDROCHLORIDE 30 MILLILITER(S): 2 INJECTION INTRAMUSCULAR; INTRAVENOUS; SUBCUTANEOUS at 19:18

## 2023-07-16 RX ADMIN — Medication 40 MILLIEQUIVALENT(S): at 13:00

## 2023-07-16 NOTE — PROGRESS NOTE ADULT - ASSESSMENT
78y Female with perforated diverticulitis, septic shock, POD 1 Fuentes's procedure     Neuro: on PCA for pain, continue for now , will add non narcotic pain meds as well    Pulm: IS OOB as tolerated    Card: continue volume expansion for now, wean pressors as tolerate d    GI: tolerating clear liquid diet for now, will monitor ostomy OP and quality of ostomy closely    Renal: hypokalemia noted, replaced, UOP adequate    ID: continue zosyn for now, likely 4day post op course    PPX: dvt ppx with SCDs and lovenox    Dispo: will continue to monitor in ICU for ongoing distributive shock requiring vasopressors related to perforated diverticulitis

## 2023-07-16 NOTE — PROGRESS NOTE ADULT - CRITICAL CARE ATTENDING COMMENT
I have seen and examined this patient with the SICU team.  No acute events overnight.  Patient appears well this morning.    Neuro: Pain controlled, awake and alert.  No distress.  Card: On phenylephrine GTT, will titrate down.  Pulm: Saturating well on room air.  GI: NPO, will ADAT to CLD.  Ostomy with clot and serosanguinous output.  Some dusky appearance, otherwise healthy-appearing.  : Voiding, will monitor creatinine.  Heme: S/p 1U pRBC, will transfuse 2 more pRBC.  Bleeding vein on colostomy cauterized with Silver Nitrate.  ID: Will continue ABx for possible septic shock 2/2 perforated diverticulitis.

## 2023-07-17 LAB
-  AMIKACIN: SIGNIFICANT CHANGE UP
-  AMOXICILLIN/CLAVULANIC ACID: SIGNIFICANT CHANGE UP
-  AMPICILLIN/SULBACTAM: SIGNIFICANT CHANGE UP
-  AMPICILLIN: SIGNIFICANT CHANGE UP
-  AZTREONAM: SIGNIFICANT CHANGE UP
-  CEFAZOLIN: SIGNIFICANT CHANGE UP
-  CEFEPIME: SIGNIFICANT CHANGE UP
-  CEFOXITIN: SIGNIFICANT CHANGE UP
-  CEFTRIAXONE: SIGNIFICANT CHANGE UP
-  CIPROFLOXACIN: SIGNIFICANT CHANGE UP
-  ERTAPENEM: SIGNIFICANT CHANGE UP
-  GENTAMICIN: SIGNIFICANT CHANGE UP
-  IMIPENEM: SIGNIFICANT CHANGE UP
-  LEVOFLOXACIN: SIGNIFICANT CHANGE UP
-  MEROPENEM: SIGNIFICANT CHANGE UP
-  PIPERACILLIN/TAZOBACTAM: SIGNIFICANT CHANGE UP
-  TOBRAMYCIN: SIGNIFICANT CHANGE UP
-  TRIMETHOPRIM/SULFAMETHOXAZOLE: SIGNIFICANT CHANGE UP
ANION GAP SERPL CALC-SCNC: 11 MMOL/L — SIGNIFICANT CHANGE UP (ref 5–17)
ANISOCYTOSIS BLD QL: SLIGHT — SIGNIFICANT CHANGE UP
APTT BLD: 32 SEC — SIGNIFICANT CHANGE UP (ref 27.5–35.5)
APTT BLD: 34.1 SEC — SIGNIFICANT CHANGE UP (ref 27.5–35.5)
BASOPHILS # BLD AUTO: 0 K/UL — SIGNIFICANT CHANGE UP (ref 0–0.2)
BASOPHILS NFR BLD AUTO: 0 % — SIGNIFICANT CHANGE UP (ref 0–2)
BUN SERPL-MCNC: 10.9 MG/DL — SIGNIFICANT CHANGE UP (ref 8–20)
BURR CELLS BLD QL SMEAR: PRESENT — SIGNIFICANT CHANGE UP
CALCIUM SERPL-MCNC: 7.7 MG/DL — LOW (ref 8.4–10.5)
CHLORIDE SERPL-SCNC: 102 MMOL/L — SIGNIFICANT CHANGE UP (ref 96–108)
CO2 SERPL-SCNC: 25 MMOL/L — SIGNIFICANT CHANGE UP (ref 22–29)
CREAT SERPL-MCNC: 0.65 MG/DL — SIGNIFICANT CHANGE UP (ref 0.5–1.3)
CULTURE RESULTS: SIGNIFICANT CHANGE UP
EGFR: 91 ML/MIN/1.73M2 — SIGNIFICANT CHANGE UP
EOSINOPHIL # BLD AUTO: 0.21 K/UL — SIGNIFICANT CHANGE UP (ref 0–0.5)
EOSINOPHIL NFR BLD AUTO: 0.9 % — SIGNIFICANT CHANGE UP (ref 0–6)
FIBRINOGEN PPP-MCNC: 717 MG/DL — HIGH (ref 200–450)
GIANT PLATELETS BLD QL SMEAR: PRESENT — SIGNIFICANT CHANGE UP
GLUCOSE BLDC GLUCOMTR-MCNC: 124 MG/DL — HIGH (ref 70–99)
GLUCOSE BLDC GLUCOMTR-MCNC: 130 MG/DL — HIGH (ref 70–99)
GLUCOSE BLDC GLUCOMTR-MCNC: 138 MG/DL — HIGH (ref 70–99)
GLUCOSE SERPL-MCNC: 197 MG/DL — HIGH (ref 70–99)
HCT VFR BLD CALC: 25.1 % — LOW (ref 34.5–45)
HCT VFR BLD CALC: 27 % — LOW (ref 34.5–45)
HGB BLD-MCNC: 8.5 G/DL — LOW (ref 11.5–15.5)
HGB BLD-MCNC: 8.9 G/DL — LOW (ref 11.5–15.5)
INR BLD: 1.76 RATIO — HIGH (ref 0.88–1.16)
INR BLD: 1.9 RATIO — HIGH (ref 0.88–1.16)
LYMPHOCYTES # BLD AUTO: 12.2 % — LOW (ref 13–44)
LYMPHOCYTES # BLD AUTO: 2.89 K/UL — SIGNIFICANT CHANGE UP (ref 1–3.3)
MAGNESIUM SERPL-MCNC: 2.3 MG/DL — SIGNIFICANT CHANGE UP (ref 1.6–2.6)
MANUAL SMEAR VERIFICATION: SIGNIFICANT CHANGE UP
MCHC RBC-ENTMCNC: 30.8 PG — SIGNIFICANT CHANGE UP (ref 27–34)
MCHC RBC-ENTMCNC: 31.3 PG — SIGNIFICANT CHANGE UP (ref 27–34)
MCHC RBC-ENTMCNC: 33 GM/DL — SIGNIFICANT CHANGE UP (ref 32–36)
MCHC RBC-ENTMCNC: 33.9 GM/DL — SIGNIFICANT CHANGE UP (ref 32–36)
MCV RBC AUTO: 92.3 FL — SIGNIFICANT CHANGE UP (ref 80–100)
MCV RBC AUTO: 93.4 FL — SIGNIFICANT CHANGE UP (ref 80–100)
METHOD TYPE: SIGNIFICANT CHANGE UP
MICROCYTES BLD QL: SLIGHT — SIGNIFICANT CHANGE UP
MONOCYTES # BLD AUTO: 1.44 K/UL — HIGH (ref 0–0.9)
MONOCYTES NFR BLD AUTO: 6.1 % — SIGNIFICANT CHANGE UP (ref 2–14)
MRSA PCR RESULT.: SIGNIFICANT CHANGE UP
MYELOCYTES NFR BLD: 2.6 % — HIGH (ref 0–0)
NEUTROPHILS # BLD AUTO: 18.49 K/UL — HIGH (ref 1.8–7.4)
NEUTROPHILS NFR BLD AUTO: 78.2 % — HIGH (ref 43–77)
NRBC # BLD: 1 /100 — HIGH (ref 0–0)
ORGANISM # SPEC MICROSCOPIC CNT: SIGNIFICANT CHANGE UP
OVALOCYTES BLD QL SMEAR: SLIGHT — SIGNIFICANT CHANGE UP
PLAT MORPH BLD: NORMAL — SIGNIFICANT CHANGE UP
PLATELET # BLD AUTO: 415 K/UL — HIGH (ref 150–400)
PLATELET # BLD AUTO: 447 K/UL — HIGH (ref 150–400)
POIKILOCYTOSIS BLD QL AUTO: SLIGHT — SIGNIFICANT CHANGE UP
POLYCHROMASIA BLD QL SMEAR: SLIGHT — SIGNIFICANT CHANGE UP
POTASSIUM SERPL-MCNC: 3.7 MMOL/L — SIGNIFICANT CHANGE UP (ref 3.5–5.3)
POTASSIUM SERPL-SCNC: 3.7 MMOL/L — SIGNIFICANT CHANGE UP (ref 3.5–5.3)
PROCALCITONIN SERPL-MCNC: 0.73 NG/ML — HIGH (ref 0.02–0.1)
PROTHROM AB SERPL-ACNC: 20.5 SEC — HIGH (ref 10.5–13.4)
PROTHROM AB SERPL-ACNC: 22.2 SEC — HIGH (ref 10.5–13.4)
RBC # BLD: 2.72 M/UL — LOW (ref 3.8–5.2)
RBC # BLD: 2.89 M/UL — LOW (ref 3.8–5.2)
RBC # FLD: 16.4 % — HIGH (ref 10.3–14.5)
RBC # FLD: 17.2 % — HIGH (ref 10.3–14.5)
RBC BLD AUTO: ABNORMAL
S AUREUS DNA NOSE QL NAA+PROBE: SIGNIFICANT CHANGE UP
SMUDGE CELLS # BLD: PRESENT — SIGNIFICANT CHANGE UP
SODIUM SERPL-SCNC: 138 MMOL/L — SIGNIFICANT CHANGE UP (ref 135–145)
SPECIMEN SOURCE: SIGNIFICANT CHANGE UP
WBC # BLD: 23.65 K/UL — HIGH (ref 3.8–10.5)
WBC # BLD: 28.04 K/UL — HIGH (ref 3.8–10.5)
WBC # FLD AUTO: 23.65 K/UL — HIGH (ref 3.8–10.5)
WBC # FLD AUTO: 28.04 K/UL — HIGH (ref 3.8–10.5)

## 2023-07-17 PROCEDURE — 99233 SBSQ HOSP IP/OBS HIGH 50: CPT

## 2023-07-17 PROCEDURE — 99223 1ST HOSP IP/OBS HIGH 75: CPT

## 2023-07-17 RX ORDER — METOPROLOL TARTRATE 50 MG
12.5 TABLET ORAL EVERY 12 HOURS
Refills: 0 | Status: DISCONTINUED | OUTPATIENT
Start: 2023-07-17 | End: 2023-07-26

## 2023-07-17 RX ORDER — DEXTROSE 50 % IN WATER 50 %
25 SYRINGE (ML) INTRAVENOUS ONCE
Refills: 0 | Status: DISCONTINUED | OUTPATIENT
Start: 2023-07-17 | End: 2023-07-20

## 2023-07-17 RX ORDER — INSULIN LISPRO 100/ML
VIAL (ML) SUBCUTANEOUS EVERY 6 HOURS
Refills: 0 | Status: DISCONTINUED | OUTPATIENT
Start: 2023-07-17 | End: 2023-07-20

## 2023-07-17 RX ORDER — SODIUM CHLORIDE 9 MG/ML
1000 INJECTION, SOLUTION INTRAVENOUS
Refills: 0 | Status: DISCONTINUED | OUTPATIENT
Start: 2023-07-17 | End: 2023-07-26

## 2023-07-17 RX ORDER — DEXTROSE 50 % IN WATER 50 %
12.5 SYRINGE (ML) INTRAVENOUS ONCE
Refills: 0 | Status: DISCONTINUED | OUTPATIENT
Start: 2023-07-17 | End: 2023-07-26

## 2023-07-17 RX ORDER — DEXTROSE 50 % IN WATER 50 %
15 SYRINGE (ML) INTRAVENOUS ONCE
Refills: 0 | Status: DISCONTINUED | OUTPATIENT
Start: 2023-07-17 | End: 2023-07-20

## 2023-07-17 RX ORDER — DEXTROSE 50 % IN WATER 50 %
25 SYRINGE (ML) INTRAVENOUS ONCE
Refills: 0 | Status: DISCONTINUED | OUTPATIENT
Start: 2023-07-17 | End: 2023-07-26

## 2023-07-17 RX ORDER — CHLORHEXIDINE GLUCONATE 213 G/1000ML
1 SOLUTION TOPICAL DAILY
Refills: 0 | Status: DISCONTINUED | OUTPATIENT
Start: 2023-07-17 | End: 2023-07-17

## 2023-07-17 RX ORDER — OXYCODONE HYDROCHLORIDE 5 MG/1
10 TABLET ORAL EVERY 6 HOURS
Refills: 0 | Status: DISCONTINUED | OUTPATIENT
Start: 2023-07-17 | End: 2023-07-23

## 2023-07-17 RX ORDER — GLUCAGON INJECTION, SOLUTION 0.5 MG/.1ML
1 INJECTION, SOLUTION SUBCUTANEOUS ONCE
Refills: 0 | Status: DISCONTINUED | OUTPATIENT
Start: 2023-07-17 | End: 2023-07-26

## 2023-07-17 RX ORDER — OXYCODONE HYDROCHLORIDE 5 MG/1
5 TABLET ORAL EVERY 6 HOURS
Refills: 0 | Status: DISCONTINUED | OUTPATIENT
Start: 2023-07-17 | End: 2023-07-23

## 2023-07-17 RX ORDER — POTASSIUM CHLORIDE 20 MEQ
10 PACKET (EA) ORAL
Refills: 0 | Status: COMPLETED | OUTPATIENT
Start: 2023-07-17 | End: 2023-07-17

## 2023-07-17 RX ORDER — METOCLOPRAMIDE HCL 10 MG
5 TABLET ORAL ONCE
Refills: 0 | Status: COMPLETED | OUTPATIENT
Start: 2023-07-17 | End: 2023-07-17

## 2023-07-17 RX ADMIN — ONDANSETRON 4 MILLIGRAM(S): 8 TABLET, FILM COATED ORAL at 17:01

## 2023-07-17 RX ADMIN — Medication 100 MILLIEQUIVALENT(S): at 04:54

## 2023-07-17 RX ADMIN — Medication 400 MILLIGRAM(S): at 17:01

## 2023-07-17 RX ADMIN — Medication 1000 MILLIGRAM(S): at 10:57

## 2023-07-17 RX ADMIN — Medication 5 MILLIGRAM(S): at 22:40

## 2023-07-17 RX ADMIN — SODIUM CHLORIDE 125 MILLILITER(S): 9 INJECTION, SOLUTION INTRAVENOUS at 06:47

## 2023-07-17 RX ADMIN — ONDANSETRON 4 MILLIGRAM(S): 8 TABLET, FILM COATED ORAL at 21:25

## 2023-07-17 RX ADMIN — ATORVASTATIN CALCIUM 40 MILLIGRAM(S): 80 TABLET, FILM COATED ORAL at 21:23

## 2023-07-17 RX ADMIN — Medication 30 MILLIGRAM(S): at 11:13

## 2023-07-17 RX ADMIN — PIPERACILLIN AND TAZOBACTAM 25 GRAM(S): 4; .5 INJECTION, POWDER, LYOPHILIZED, FOR SOLUTION INTRAVENOUS at 06:45

## 2023-07-17 RX ADMIN — PIPERACILLIN AND TAZOBACTAM 25 GRAM(S): 4; .5 INJECTION, POWDER, LYOPHILIZED, FOR SOLUTION INTRAVENOUS at 13:00

## 2023-07-17 RX ADMIN — Medication 400 MILLIGRAM(S): at 10:27

## 2023-07-17 RX ADMIN — Medication 100 MILLIEQUIVALENT(S): at 06:48

## 2023-07-17 RX ADMIN — Medication 200 MILLIGRAM(S): at 17:02

## 2023-07-17 RX ADMIN — PIPERACILLIN AND TAZOBACTAM 25 GRAM(S): 4; .5 INJECTION, POWDER, LYOPHILIZED, FOR SOLUTION INTRAVENOUS at 21:14

## 2023-07-17 RX ADMIN — Medication 100 MILLIEQUIVALENT(S): at 03:21

## 2023-07-17 RX ADMIN — HYDROMORPHONE HYDROCHLORIDE 30 MILLILITER(S): 2 INJECTION INTRAMUSCULAR; INTRAVENOUS; SUBCUTANEOUS at 07:07

## 2023-07-17 RX ADMIN — Medication 12.5 MILLIGRAM(S): at 17:27

## 2023-07-17 RX ADMIN — Medication 1000 MILLIGRAM(S): at 17:31

## 2023-07-17 RX ADMIN — Medication 1000 MILLIGRAM(S): at 03:40

## 2023-07-17 RX ADMIN — Medication 200 MILLIGRAM(S): at 06:48

## 2023-07-17 RX ADMIN — CHLORHEXIDINE GLUCONATE 1 APPLICATION(S): 213 SOLUTION TOPICAL at 11:18

## 2023-07-17 RX ADMIN — Medication 400 MILLIGRAM(S): at 03:21

## 2023-07-17 NOTE — CHART NOTE - NSCHARTNOTEFT_GEN_A_CORE
SICU TRANSFER NOTE  -----------------------------  ICU Admission Date: 7/15  Transfer Date: 07-17-23 @ 18:31    Admission Diagnosis: Sepsis secondary to perforated diverticulitis     Active Problems/injuries: Acute blood loss anemia secondary to post op venous bleeding from colostomy     Procedures: 7/15: Fuentes's procedure     Consultants:  [ ] Cardiology  [ ] Endocrine  [ ] Infectious Disease  [ ] Medicine  [ ]Neurosurgery  [ ] Ortho       [ ] Weight Bearing Status:  [ ] Palliative       [ ] Advanced Directives:    [ ] Physical Medicine and Rehab       [ ] Disposition :   [ ] Plastics  [ ] Pulmonary    Medications  atorvastatin 40 milliGRAM(s) Oral at bedtime  carBAMazepine 200 milliGRAM(s) Oral two times a day  dextrose 5%. 1000 milliLiter(s) IV Continuous <Continuous>  dextrose 5%. 1000 milliLiter(s) IV Continuous <Continuous>  dextrose 50% Injectable 25 Gram(s) IV Push once  dextrose 50% Injectable 25 Gram(s) IV Push once  dextrose 50% Injectable 12.5 Gram(s) IV Push once  dextrose Oral Gel 15 Gram(s) Oral once PRN  enoxaparin Injectable 40 milliGRAM(s) SubCutaneous every 24 hours  glucagon  Injectable 1 milliGRAM(s) IntraMuscular once  insulin lispro (ADMELOG) corrective regimen sliding scale   SubCutaneous every 6 hours  metoprolol tartrate 12.5 milliGRAM(s) Oral every 12 hours  naloxone Injectable 0.1 milliGRAM(s) IV Push every 3 minutes PRN  ondansetron Injectable 4 milliGRAM(s) IV Push every 6 hours PRN  oxyCODONE    IR 5 milliGRAM(s) Oral every 6 hours PRN  oxyCODONE    IR 10 milliGRAM(s) Oral every 6 hours PRN  PARoxetine 30 milliGRAM(s) Oral daily  piperacillin/tazobactam IVPB.. 3.375 Gram(s) IV Intermittent every 8 hours  potassium chloride   Powder 40 milliEquivalent(s) Oral once      [ x] I attest I have reviewed and reconciled all medications prior to transfer        Antibiotics:  piperacillin/tazobactam IVPB.. 3.375 Gram(s) IV Intermittent every 8 hours    Indication: perforated diverticulitis  End Date: suspected 4 days       I have discussed this case with JENNIFER KIMBALL upon transfer and all questions regarding ICU course were answered.  The following items are to be followed up:  1. Pain control, d/danna PCA to avoid narcotics  2. Off levophed, restarted home lopressor. Start antihypertensives in AM  3. CLD, awaiting colosotmy function  4. S/p cautery of venous bleeding - no blood per colostomy since  5. Passed TOV  6. Zosyn likely 4 days from source control. Procalcitonin sent fo baseline , team may trend  7. Lovenox for dvt   8. LEV output monitoring SICU TRANSFER NOTE  -----------------------------  ICU Admission Date: 7/15  Transfer Date: 07-17-23 @ 18:31    Admission Diagnosis: Sepsis secondary to perforated diverticulitis     Active Problems/injuries: Acute blood loss anemia secondary to post op venous bleeding from colostomy     Procedures: 7/15: Fuentes's procedure     Consultants:  [ ] Cardiology  [ ] Endocrine  [ ] Infectious Disease  [ ] Medicine  [ ]Neurosurgery  [ ] Ortho       [ ] Weight Bearing Status:  [ ] Palliative       [ ] Advanced Directives:    [ ] Physical Medicine and Rehab       [ ] Disposition :   [ ] Plastics  [ ] Pulmonary    Medications  atorvastatin 40 milliGRAM(s) Oral at bedtime  carBAMazepine 200 milliGRAM(s) Oral two times a day  dextrose 5%. 1000 milliLiter(s) IV Continuous <Continuous>  dextrose 5%. 1000 milliLiter(s) IV Continuous <Continuous>  dextrose 50% Injectable 25 Gram(s) IV Push once  dextrose 50% Injectable 25 Gram(s) IV Push once  dextrose 50% Injectable 12.5 Gram(s) IV Push once  dextrose Oral Gel 15 Gram(s) Oral once PRN  enoxaparin Injectable 40 milliGRAM(s) SubCutaneous every 24 hours  glucagon  Injectable 1 milliGRAM(s) IntraMuscular once  insulin lispro (ADMELOG) corrective regimen sliding scale   SubCutaneous every 6 hours  metoprolol tartrate 12.5 milliGRAM(s) Oral every 12 hours  naloxone Injectable 0.1 milliGRAM(s) IV Push every 3 minutes PRN  ondansetron Injectable 4 milliGRAM(s) IV Push every 6 hours PRN  oxyCODONE    IR 5 milliGRAM(s) Oral every 6 hours PRN  oxyCODONE    IR 10 milliGRAM(s) Oral every 6 hours PRN  PARoxetine 30 milliGRAM(s) Oral daily  piperacillin/tazobactam IVPB.. 3.375 Gram(s) IV Intermittent every 8 hours  potassium chloride   Powder 40 milliEquivalent(s) Oral once      [ x] I attest I have reviewed and reconciled all medications prior to transfer        Antibiotics:  piperacillin/tazobactam IVPB.. 3.375 Gram(s) IV Intermittent every 8 hours    Indication: perforated diverticulitis  End Date: suspected 4 days       I have discussed this case with JENNIFER KIMBALL upon transfer and all questions regarding ICU course were answered.  The following items are to be followed up:  1. Pain control, d/danna PCA to avoid narcotics  2. Off levophed, restarted home lopressor. Start antihypertensives in AM  3. CLD, awaiting colosotmy function  4. S/p cautery of venous bleeding - no blood per colostomy since  5. Passed TOV  6. Zosyn likely 4 days from source control. Procalcitonin sent fo baseline , team may trend  7. Lovenox for dvt   8. LEV output monitoring  9. F/u hemoglobin A1c, improve glucose control

## 2023-07-17 NOTE — PROGRESS NOTE ADULT - NS ATTEND AMEND GEN_ALL_CORE FT
78 year old woman POD 2/ Mirtha's procedure.   Admitted to SICU ppost op for hypotension.     NEURO: Mental status appropriate. Pain controlled on Dilauding PCA.    DC PCA. Continue IV Tylenol and start Oxy PRN.   Continue home carbemazepine foir sz ppx.     RESP: Stab;e on room air.      CVS: Post op hypotension likely secondary to mhypovolemia. Improved with IVF resuscitation and transfusions. Weaned off Levo at approximately 10am.     GI: Tolerating CLD. Ostomy with no evidence of bleeding. No output yet.   Abdominal exam benign.   LEV 120cc of serosanguinous.     : Electrolytes grossly unremarkable. Robison in place with UOP 45cc/hr. IV locked.   DC robison.     ENDO: Mild hyperg;lyucemia. Continue to mornior once diet resumes. Hb A1c ordered.     HEME: s/p 3 RBCs and 2 FFP. Hb responded appropraitly. F/ip repeat INR. SCDs for DVT ppx.     ID: Afebrile, WBC increasing to 28. Continue Zosyn for POD 4 (7/19/23) if clinical pictures continues to improve.   Send preocal today.   F/up OR cultures POD 2/ Mirtha's procedure for perforated diverticulitis.    Admitted to SICU post op for hypotension.     NEURO: Mental status appropriate. Pain controlled on Dilaudid PCA.    -DC PCA. Continue IV Tylenol and start Oxycodone PRN now that she is tolerating PO.   -Continue home carbamazepine for seizure prophylaxis.     RESP: Stable on room air.      CVS: Post op hypotension likely secondary to hypovolemia(bleeding from ostomy) vs sepsis. Improved with IVF resuscitation and transfusions. Weaned off Levo at approximately 10am this morning.     GI: Abdominal exam benign. Tolerating CLD. Ostomy with no evidence of bleeding at this time. No output yet. LEV with 120cc of serosanguinous output.   -Continue CLD pending ostomy output.     : Electrolytes grossly unremarkable. Adequate urine output. IV locked.   -DC robison.     ENDO: Mild hyperglycinemia, no hx of DM. Continue to monitor once diet resumes.   -Order HbA1c.     HEME: s/p 3 RBCs and 2 FFP. No further evidence of active bleeding and Hb has responded appropriately. Continue SCDs and Lovenox for DVT ppx. INR 1.9 likely due to sepsis. Will continue to monitor and transfuse FFP if needed.     ID: Afebrile, WBC increasing to 28. Continue Zosyn for POD 4 days post op(7/19/23) once clinical picture continues to improve.   -Send procal today.   -F/up OR cultures    Overall, patient's clinical status continues to improve. She has now been weaned off pressors. Plan for downgrade to the flood within the next 24 hours once hemodynamic status remains stable.

## 2023-07-17 NOTE — DIETITIAN INITIAL EVALUATION ADULT - PERTINENT LABORATORY DATA
07-17    138  |  102  |  10.9  ----------------------------<  197<H>  3.7   |  25.0  |  0.65    Ca    7.7<L>      17 Jul 2023 02:11  Phos  3.2     07-16  Mg     2.3     07-17

## 2023-07-17 NOTE — PROGRESS NOTE ADULT - ASSESSMENT
78 year old M, HTN, HLD, Seizures, CAD with one stent on plavix, h/o UTI's presenting with LLQ abdominal pain since July 5th, no history of diverticulitis, she has a family history of colon cancer in her brother, last colonoscopy was over 30 years ago, denies fever or chills, she has been nauseous but denies vomiting.  No additional complaints.  She was transferred from [facility] for a higher level of care. Admitted for perforated diverticulitis with pneumoperitoneum    Perforated diverticulitis  Pneumoperitoneum  Leukocytosis  Diarrhea  Hypokalemia  Anemia    - f/u BCX ngtd  - c diff (-)  - gi pcr (-)  - pt underwent repeat cta/p 7/14 due to leukocytosis and was found to have developing perisigmoid abscess  - now s/p 7/15/23 exp lap, hartmanns procedure, found to have feculent peritonitis and pelvic abscess  - post op hypotension suspected due to acute blood loss anemia secondary to  bleeding vein at colostomy which was cauterized   - OR cx + polymicrobial with luis, f/u final ID +S  - Continue zosyn for now pending clinical course  - procalcitonin may be falsely elevated after surgery  - Trend Fever  - Trend Leukocytosis, elevated  - trend h/h        Will Follow

## 2023-07-17 NOTE — CHART NOTE - NSCHARTNOTEFT_GEN_A_CORE
Marimar Rocha is a 77 yo F admitted to the hospital on 07/06 as a transfer from [facility] for higher level of care for perforated diverticulitis. She was being managed conservatively on the floor until 07/14 when rising WBC and     Patient seen and examined at bedside.     Vitals:    Labs:    Exam:  Gen: pt lying in bed, alert, in NAD  Resp: unlabored  CVS: RRR  Abd: soft, NT, ND  Ext: moving all extremities spontaneously, sensation intact, pulses 2+     Plan:  Neuro: pain control PRN  CVS: monitor BP/HR, maintain MAP > 65  Resp: monitor RR/So2  GI: PPI  : monitor electrolytes and replete as needed, strict Is/Os  Heme: monitor CBC  ID: monitor temperature  Endo: monitor glucose Marimar Rocha is a 79 yo F admitted to the hospital on 07/06 as a transfer from [facility] for higher level of care for perforated diverticulitis. She was being managed conservatively on the floor until 07/14 when rising WBC and concerning CT scan prompted the team to take her to the OR. She had an ex-lap and a Fuentes's on 07/15 with Dr. Gutierrez. She required pressor support after surgery and so was sent to the SICU post-operatively. Over the past two days she has been weaned from pressors, she has voided spontaneously, ostomy productive of gas and stool.    She has a LEV drain in the pelvis. Her Zosyn is set to end 07/19. She was on a CLD prior to downgrade. Her sugars were averaging in the 200s.    Patient seen and examined at bedside. She was seen with an emesis bag, stating that she felt nauseous despite zofran 4mg IV given.     Vitals:  ICU Vital Signs Last 24 Hrs  T(C): 36.8 (17 Jul 2023 21:00), Max: 37 (17 Jul 2023 09:15)  T(F): 98.2 (17 Jul 2023 21:00), Max: 98.6 (17 Jul 2023 09:15)  HR: 105 (17 Jul 2023 21:00) (69 - 105)  BP: 133/73 (17 Jul 2023 21:00) (89/50 - 158/67)  BP(mean): 85 (17 Jul 2023 18:30) (60 - 105)  ABP: --  ABP(mean): --  RR: 18 (17 Jul 2023 21:00) (13 - 23)  SpO2: 94% (17 Jul 2023 21:00) (89% - 100%)    O2 Parameters below as of 17 Jul 2023 21:00  Patient On (Oxygen Delivery Method): room air    Labs:                        8.5    23.65 )-----------( 415      ( 17 Jul 2023 12:10 )             25.1       07-17    138  |  102  |  10.9  ----------------------------<  197<H>  3.7   |  25.0  |  0.65    Ca    7.7<L>      17 Jul 2023 02:11  Phos  3.2     07-16  Mg     2.3     07-17    Exam:  Gen: pt lying in bed, alert, awake,   Resp: unlabored  CVS: RRR  Abd: soft, NT, ND, ostomy in place on L hemiabdomen with stool and gas, midline incision covered by ABD pad dressing, LEV drain with serosang output  Ext: moving all extremities spontaneously    Plan:  Neuro: pain control PRN  CVS: monitor BP/HR, keep on telemetry monitoring  Resp: monitor RR/So2  GI: PPI  : monitor electrolytes and replete as needed, strict Is/Os  Heme: monitor CBC  ID: monitor temperature  Endo: monitor glucose Marimar Rocha is a 77 yo F admitted to the hospital on 07/06 as a transfer from [facility] for higher level of care for perforated diverticulitis. She was being managed conservatively on the floor until 07/14 when rising WBC and concerning CT scan prompted the team to take her to the OR. She had an ex-lap and a Fuentes's on 07/15 with Dr. Gutierrez. She required pressor support after surgery and so was sent to the SICU post-operatively. Over the past two days she has been weaned from pressors, she has voided spontaneously, ostomy productive of gas and stool.    She has a LEV drain in the pelvis. Her Zosyn is set to end 07/19. She was on a CLD prior to downgrade. Her sugars were averaging in the 200s.    Patient seen and examined at bedside. She was seen with an emesis bag, stating that she felt nauseous despite zofran 4mg IV given. Patient persistently nauseous despite Zofran x 2, 1 x episode of emesis. Patient backed down to NPO, 1x Reglan added.    Vitals:  ICU Vital Signs Last 24 Hrs  T(C): 36.8 (17 Jul 2023 21:00), Max: 37 (17 Jul 2023 09:15)  T(F): 98.2 (17 Jul 2023 21:00), Max: 98.6 (17 Jul 2023 09:15)  HR: 105 (17 Jul 2023 21:00) (69 - 105)  BP: 133/73 (17 Jul 2023 21:00) (89/50 - 158/67)  BP(mean): 85 (17 Jul 2023 18:30) (60 - 105)  ABP: --  ABP(mean): --  RR: 18 (17 Jul 2023 21:00) (13 - 23)  SpO2: 94% (17 Jul 2023 21:00) (89% - 100%)    O2 Parameters below as of 17 Jul 2023 21:00  Patient On (Oxygen Delivery Method): room air    Labs:                        8.5    23.65 )-----------( 415      ( 17 Jul 2023 12:10 )             25.1       07-17    138  |  102  |  10.9  ----------------------------<  197<H>  3.7   |  25.0  |  0.65    Ca    7.7<L>      17 Jul 2023 02:11  Phos  3.2     07-16  Mg     2.3     07-17    Exam:  Gen: pt lying in bed, alert, awake,   Resp: unlabored  CVS: RRR  Abd: soft, NT, ND, ostomy in place on L darleen-abdomen with stool and gas, midline incision covered by ABD pad dressing, LEV drain to suction with serosang output  Ext: moving all extremities spontaneously    Plan:  Neuro: pain control PRN  CVS: monitor BP/HR, keep on telemetry monitoring for now  Resp: monitor RR/So2  GI: NPO, monitor nausea, vomiting  : monitor electrolytes and replete as needed, strict Is/Os  Heme: monitor CBC  ID: monitor WBC, Zosyn to end 7/19  Endo: improve glucose control  Vasc: lov 40 QD for DVT ppx

## 2023-07-17 NOTE — PROGRESS NOTE ADULT - ASSESSMENT
A: 78 year old M, HTN, HLD, Seizures, CAD with one stent on plavix admitted with perforated diverticulitis with pneumoperitoneum, POD 2 from a Mirtha's procedure     P:  CLD, ostomy active, remains on pressors   Monitor colostomy output   IV Abx  Daily labs

## 2023-07-17 NOTE — DIETITIAN INITIAL EVALUATION ADULT - PERTINENT MEDS FT
MEDICATIONS  (STANDING):  acetaminophen   IVPB .. 1000 milliGRAM(s) IV Intermittent every 6 hours  atorvastatin 40 milliGRAM(s) Oral at bedtime  carBAMazepine 200 milliGRAM(s) Oral two times a day  enoxaparin Injectable 40 milliGRAM(s) SubCutaneous every 24 hours  HYDROmorphone PCA (1 mG/mL) 30 milliLiter(s) PCA Continuous PCA Continuous  multiple electrolytes Injection Type 1 1000 milliLiter(s) (125 mL/Hr) IV Continuous <Continuous>  norepinephrine Infusion 0.05 MICROgram(s)/kG/Min (11.9 mL/Hr) IV Continuous <Continuous>  PARoxetine 30 milliGRAM(s) Oral daily  piperacillin/tazobactam IVPB.. 3.375 Gram(s) IV Intermittent every 8 hours  potassium chloride   Powder 40 milliEquivalent(s) Oral once    MEDICATIONS  (PRN):  naloxone Injectable 0.1 milliGRAM(s) IV Push every 3 minutes PRN For ANY of the following changes in patient status:  A. RR LESS THAN 10 breaths per minute, B. Oxygen saturation LESS THAN 90%, C. Sedation score of 6  ondansetron Injectable 4 milliGRAM(s) IV Push every 6 hours PRN Nausea

## 2023-07-17 NOTE — DIETITIAN INITIAL EVALUATION ADULT - NSFNSGIIOFT_GEN_A_CORE
07-16-23 @ 07:01  -  07-17-23 @ 07:00  --------------------------------------------------------  OUT:    Colostomy (mL): 775 mL  Total OUT: 775 mL    Total NET: -775 mL

## 2023-07-17 NOTE — DIETITIAN INITIAL EVALUATION ADULT - ORAL INTAKE PTA/DIET HISTORY
Pt reports eating well PTA; denies any recent weight changes. Pt reports currently not following any specific diets. Pt started on clear liquids; tolerating well, pt reports being hungry and wants solid food. Pt complains of mild excisional pain to abd. Denies Nausea/vomiting at this time.

## 2023-07-17 NOTE — DIETITIAN INITIAL EVALUATION ADULT - OTHER INFO
Pt is a 78 year old Female who presented with perforated diverticulitis now s/p Mirtha's procedure complicated by postoperative hypotension in PACU requiring pressors

## 2023-07-18 DIAGNOSIS — E87.70 FLUID OVERLOAD, UNSPECIFIED: ICD-10-CM

## 2023-07-18 LAB
A1C WITH ESTIMATED AVERAGE GLUCOSE RESULT: 5.9 % — HIGH (ref 4–5.6)
ANION GAP SERPL CALC-SCNC: 11 MMOL/L — SIGNIFICANT CHANGE UP (ref 5–17)
ANION GAP SERPL CALC-SCNC: 8 MMOL/L — SIGNIFICANT CHANGE UP (ref 5–17)
APPEARANCE UR: ABNORMAL
BACTERIA # UR AUTO: ABNORMAL
BASOPHILS # BLD AUTO: 0 K/UL — SIGNIFICANT CHANGE UP (ref 0–0.2)
BASOPHILS # BLD AUTO: 0.14 K/UL — SIGNIFICANT CHANGE UP (ref 0–0.2)
BASOPHILS NFR BLD AUTO: 0 % — SIGNIFICANT CHANGE UP (ref 0–2)
BASOPHILS NFR BLD AUTO: 0.5 % — SIGNIFICANT CHANGE UP (ref 0–2)
BILIRUB UR-MCNC: NEGATIVE — SIGNIFICANT CHANGE UP
BUN SERPL-MCNC: 8.3 MG/DL — SIGNIFICANT CHANGE UP (ref 8–20)
BUN SERPL-MCNC: 8.8 MG/DL — SIGNIFICANT CHANGE UP (ref 8–20)
BUN SERPL-MCNC: 9.3 MG/DL — SIGNIFICANT CHANGE UP (ref 8–20)
CALCIUM SERPL-MCNC: 7.8 MG/DL — LOW (ref 8.4–10.5)
CALCIUM SERPL-MCNC: 7.9 MG/DL — LOW (ref 8.4–10.5)
CALCIUM SERPL-MCNC: 8.3 MG/DL — LOW (ref 8.4–10.5)
CHLORIDE SERPL-SCNC: 101 MMOL/L — SIGNIFICANT CHANGE UP (ref 96–108)
CHLORIDE SERPL-SCNC: 103 MMOL/L — SIGNIFICANT CHANGE UP (ref 96–108)
CHLORIDE SERPL-SCNC: 104 MMOL/L — SIGNIFICANT CHANGE UP (ref 96–108)
CO2 SERPL-SCNC: 27 MMOL/L — SIGNIFICANT CHANGE UP (ref 22–29)
CO2 SERPL-SCNC: 28 MMOL/L — SIGNIFICANT CHANGE UP (ref 22–29)
CO2 SERPL-SCNC: 30 MMOL/L — HIGH (ref 22–29)
COD CRY URNS QL: ABNORMAL
COLOR SPEC: YELLOW — SIGNIFICANT CHANGE UP
COMMENT - URINE: SIGNIFICANT CHANGE UP
CREAT SERPL-MCNC: 0.57 MG/DL — SIGNIFICANT CHANGE UP (ref 0.5–1.3)
CREAT SERPL-MCNC: 0.58 MG/DL — SIGNIFICANT CHANGE UP (ref 0.5–1.3)
CREAT SERPL-MCNC: 0.61 MG/DL — SIGNIFICANT CHANGE UP (ref 0.5–1.3)
DIFF PNL FLD: ABNORMAL
EGFR: 93 ML/MIN/1.73M2 — SIGNIFICANT CHANGE UP
EGFR: 94 ML/MIN/1.73M2 — SIGNIFICANT CHANGE UP
EOSINOPHIL # BLD AUTO: 0.01 K/UL — SIGNIFICANT CHANGE UP (ref 0–0.5)
EOSINOPHIL # BLD AUTO: 0.22 K/UL — SIGNIFICANT CHANGE UP (ref 0–0.5)
EOSINOPHIL NFR BLD AUTO: 0 % — SIGNIFICANT CHANGE UP (ref 0–6)
EOSINOPHIL NFR BLD AUTO: 0.9 % — SIGNIFICANT CHANGE UP (ref 0–6)
EPI CELLS # UR: SIGNIFICANT CHANGE UP
ESTIMATED AVERAGE GLUCOSE: 123 MG/DL — HIGH (ref 68–114)
GLUCOSE BLDC GLUCOMTR-MCNC: 108 MG/DL — HIGH (ref 70–99)
GLUCOSE BLDC GLUCOMTR-MCNC: 117 MG/DL — HIGH (ref 70–99)
GLUCOSE BLDC GLUCOMTR-MCNC: 150 MG/DL — HIGH (ref 70–99)
GLUCOSE SERPL-MCNC: 112 MG/DL — HIGH (ref 70–99)
GLUCOSE SERPL-MCNC: 141 MG/DL — HIGH (ref 70–99)
GLUCOSE SERPL-MCNC: 148 MG/DL — HIGH (ref 70–99)
GLUCOSE UR QL: NEGATIVE MG/DL — SIGNIFICANT CHANGE UP
GRAN CASTS # UR COMP ASSIST: ABNORMAL /LPF
HCT VFR BLD CALC: 26.2 % — LOW (ref 34.5–45)
HCT VFR BLD CALC: 28.4 % — LOW (ref 34.5–45)
HCT VFR BLD CALC: 30.1 % — LOW (ref 34.5–45)
HGB BLD-MCNC: 10 G/DL — LOW (ref 11.5–15.5)
HGB BLD-MCNC: 8.4 G/DL — LOW (ref 11.5–15.5)
HGB BLD-MCNC: 9.8 G/DL — LOW (ref 11.5–15.5)
HYALINE CASTS # UR AUTO: ABNORMAL /LPF
IMM GRANULOCYTES NFR BLD AUTO: 5.2 % — HIGH (ref 0–0.9)
INR BLD: 1.77 RATIO — HIGH (ref 0.88–1.16)
KETONES UR-MCNC: ABNORMAL
LACTATE SERPL-SCNC: 1 MMOL/L — SIGNIFICANT CHANGE UP (ref 0.5–2)
LEUKOCYTE ESTERASE UR-ACNC: NEGATIVE — SIGNIFICANT CHANGE UP
LYMPHOCYTES # BLD AUTO: 0.97 K/UL — LOW (ref 1–3.3)
LYMPHOCYTES # BLD AUTO: 1.28 K/UL — SIGNIFICANT CHANGE UP (ref 1–3.3)
LYMPHOCYTES # BLD AUTO: 3.2 % — LOW (ref 13–44)
LYMPHOCYTES # BLD AUTO: 5.2 % — LOW (ref 13–44)
MAGNESIUM SERPL-MCNC: 2.1 MG/DL — SIGNIFICANT CHANGE UP (ref 1.6–2.6)
MAGNESIUM SERPL-MCNC: 2.2 MG/DL — SIGNIFICANT CHANGE UP (ref 1.6–2.6)
MAGNESIUM SERPL-MCNC: 2.3 MG/DL — SIGNIFICANT CHANGE UP (ref 1.6–2.6)
MANUAL SMEAR VERIFICATION: SIGNIFICANT CHANGE UP
MCHC RBC-ENTMCNC: 30.9 PG — SIGNIFICANT CHANGE UP (ref 27–34)
MCHC RBC-ENTMCNC: 31.6 PG — SIGNIFICANT CHANGE UP (ref 27–34)
MCHC RBC-ENTMCNC: 32.1 GM/DL — SIGNIFICANT CHANGE UP (ref 32–36)
MCHC RBC-ENTMCNC: 32.5 PG — SIGNIFICANT CHANGE UP (ref 27–34)
MCHC RBC-ENTMCNC: 33.2 GM/DL — SIGNIFICANT CHANGE UP (ref 32–36)
MCHC RBC-ENTMCNC: 34.5 GM/DL — SIGNIFICANT CHANGE UP (ref 32–36)
MCV RBC AUTO: 94 FL — SIGNIFICANT CHANGE UP (ref 80–100)
MCV RBC AUTO: 95.3 FL — SIGNIFICANT CHANGE UP (ref 80–100)
MCV RBC AUTO: 96.3 FL — SIGNIFICANT CHANGE UP (ref 80–100)
MONOCYTES # BLD AUTO: 0.42 K/UL — SIGNIFICANT CHANGE UP (ref 0–0.9)
MONOCYTES # BLD AUTO: 0.98 K/UL — HIGH (ref 0–0.9)
MONOCYTES NFR BLD AUTO: 1.7 % — LOW (ref 2–14)
MONOCYTES NFR BLD AUTO: 3.2 % — SIGNIFICANT CHANGE UP (ref 2–14)
MYELOCYTES NFR BLD: 2.6 % — HIGH (ref 0–0)
NEUTROPHILS # BLD AUTO: 21.84 K/UL — HIGH (ref 1.8–7.4)
NEUTROPHILS # BLD AUTO: 27.02 K/UL — HIGH (ref 1.8–7.4)
NEUTROPHILS NFR BLD AUTO: 87.9 % — HIGH (ref 43–77)
NEUTROPHILS NFR BLD AUTO: 88.7 % — HIGH (ref 43–77)
NITRITE UR-MCNC: NEGATIVE — SIGNIFICANT CHANGE UP
PH UR: 5 — SIGNIFICANT CHANGE UP (ref 5–8)
PHOSPHATE SERPL-MCNC: 2.1 MG/DL — LOW (ref 2.4–4.7)
PHOSPHATE SERPL-MCNC: 2.7 MG/DL — SIGNIFICANT CHANGE UP (ref 2.4–4.7)
PLAT MORPH BLD: NORMAL — SIGNIFICANT CHANGE UP
PLATELET # BLD AUTO: 507 K/UL — HIGH (ref 150–400)
PLATELET # BLD AUTO: 536 K/UL — HIGH (ref 150–400)
PLATELET # BLD AUTO: 549 K/UL — HIGH (ref 150–400)
POTASSIUM SERPL-MCNC: 3.7 MMOL/L — SIGNIFICANT CHANGE UP (ref 3.5–5.3)
POTASSIUM SERPL-MCNC: 3.9 MMOL/L — SIGNIFICANT CHANGE UP (ref 3.5–5.3)
POTASSIUM SERPL-SCNC: 3.7 MMOL/L — SIGNIFICANT CHANGE UP (ref 3.5–5.3)
POTASSIUM SERPL-SCNC: 3.9 MMOL/L — SIGNIFICANT CHANGE UP (ref 3.5–5.3)
PROCALCITONIN SERPL-MCNC: 0.49 NG/ML — HIGH (ref 0.02–0.1)
PROMYELOCYTES # FLD: 0.9 % — HIGH (ref 0–0)
PROT UR-MCNC: 100
PROTHROM AB SERPL-ACNC: 20.6 SEC — HIGH (ref 10.5–13.4)
RBC # BLD: 2.72 M/UL — LOW (ref 3.8–5.2)
RBC # BLD: 3.02 M/UL — LOW (ref 3.8–5.2)
RBC # BLD: 3.16 M/UL — LOW (ref 3.8–5.2)
RBC # FLD: 16.1 % — HIGH (ref 10.3–14.5)
RBC # FLD: 16.5 % — HIGH (ref 10.3–14.5)
RBC # FLD: 16.8 % — HIGH (ref 10.3–14.5)
RBC BLD AUTO: NORMAL — SIGNIFICANT CHANGE UP
RBC CASTS # UR COMP ASSIST: ABNORMAL /HPF (ref 0–4)
SODIUM SERPL-SCNC: 139 MMOL/L — SIGNIFICANT CHANGE UP (ref 135–145)
SODIUM SERPL-SCNC: 141 MMOL/L — SIGNIFICANT CHANGE UP (ref 135–145)
SODIUM SERPL-SCNC: 142 MMOL/L — SIGNIFICANT CHANGE UP (ref 135–145)
SP GR SPEC: 1.02 — SIGNIFICANT CHANGE UP (ref 1.01–1.02)
UROBILINOGEN FLD QL: NEGATIVE MG/DL — SIGNIFICANT CHANGE UP
WBC # BLD: 24.62 K/UL — HIGH (ref 3.8–10.5)
WBC # BLD: 28.96 K/UL — HIGH (ref 3.8–10.5)
WBC # BLD: 30.72 K/UL — HIGH (ref 3.8–10.5)
WBC # FLD AUTO: 24.62 K/UL — HIGH (ref 3.8–10.5)
WBC # FLD AUTO: 28.96 K/UL — HIGH (ref 3.8–10.5)
WBC # FLD AUTO: 30.72 K/UL — HIGH (ref 3.8–10.5)
WBC UR QL: SIGNIFICANT CHANGE UP /HPF (ref 0–5)

## 2023-07-18 PROCEDURE — 99233 SBSQ HOSP IP/OBS HIGH 50: CPT

## 2023-07-18 PROCEDURE — 99232 SBSQ HOSP IP/OBS MODERATE 35: CPT

## 2023-07-18 PROCEDURE — 74018 RADEX ABDOMEN 1 VIEW: CPT | Mod: 26,77

## 2023-07-18 PROCEDURE — 93010 ELECTROCARDIOGRAM REPORT: CPT

## 2023-07-18 PROCEDURE — 74018 RADEX ABDOMEN 1 VIEW: CPT | Mod: 26

## 2023-07-18 PROCEDURE — 71045 X-RAY EXAM CHEST 1 VIEW: CPT | Mod: 26

## 2023-07-18 PROCEDURE — 71275 CT ANGIOGRAPHY CHEST: CPT | Mod: 26

## 2023-07-18 RX ORDER — POTASSIUM PHOSPHATE, MONOBASIC POTASSIUM PHOSPHATE, DIBASIC 236; 224 MG/ML; MG/ML
30 INJECTION, SOLUTION INTRAVENOUS ONCE
Refills: 0 | Status: COMPLETED | OUTPATIENT
Start: 2023-07-18 | End: 2023-07-19

## 2023-07-18 RX ORDER — METOCLOPRAMIDE HCL 10 MG
5 TABLET ORAL ONCE
Refills: 0 | Status: COMPLETED | OUTPATIENT
Start: 2023-07-18 | End: 2023-07-18

## 2023-07-18 RX ORDER — PANTOPRAZOLE SODIUM 20 MG/1
40 TABLET, DELAYED RELEASE ORAL DAILY
Refills: 0 | Status: DISCONTINUED | OUTPATIENT
Start: 2023-07-18 | End: 2023-07-26

## 2023-07-18 RX ORDER — FUROSEMIDE 40 MG
40 TABLET ORAL DAILY
Refills: 0 | Status: COMPLETED | OUTPATIENT
Start: 2023-07-18 | End: 2023-07-20

## 2023-07-18 RX ORDER — SODIUM CHLORIDE 9 MG/ML
500 INJECTION, SOLUTION INTRAVENOUS ONCE
Refills: 0 | Status: COMPLETED | OUTPATIENT
Start: 2023-07-18 | End: 2023-07-18

## 2023-07-18 RX ORDER — SODIUM CHLORIDE 9 MG/ML
1000 INJECTION, SOLUTION INTRAVENOUS
Refills: 0 | Status: DISCONTINUED | OUTPATIENT
Start: 2023-07-18 | End: 2023-07-21

## 2023-07-18 RX ORDER — FUROSEMIDE 40 MG
20 TABLET ORAL ONCE
Refills: 0 | Status: COMPLETED | OUTPATIENT
Start: 2023-07-18 | End: 2023-07-18

## 2023-07-18 RX ORDER — FUROSEMIDE 40 MG
20 TABLET ORAL DAILY
Refills: 0 | Status: DISCONTINUED | OUTPATIENT
Start: 2023-07-18 | End: 2023-07-18

## 2023-07-18 RX ORDER — POTASSIUM PHOSPHATE, MONOBASIC POTASSIUM PHOSPHATE, DIBASIC 236; 224 MG/ML; MG/ML
30 INJECTION, SOLUTION INTRAVENOUS ONCE
Refills: 0 | Status: COMPLETED | OUTPATIENT
Start: 2023-07-18 | End: 2023-07-18

## 2023-07-18 RX ORDER — ACETAMINOPHEN 500 MG
1000 TABLET ORAL ONCE
Refills: 0 | Status: COMPLETED | OUTPATIENT
Start: 2023-07-18 | End: 2023-07-18

## 2023-07-18 RX ADMIN — Medication 400 MILLIGRAM(S): at 10:08

## 2023-07-18 RX ADMIN — PANTOPRAZOLE SODIUM 40 MILLIGRAM(S): 20 TABLET, DELAYED RELEASE ORAL at 12:55

## 2023-07-18 RX ADMIN — Medication 1000 MILLIGRAM(S): at 11:08

## 2023-07-18 RX ADMIN — PIPERACILLIN AND TAZOBACTAM 25 GRAM(S): 4; .5 INJECTION, POWDER, LYOPHILIZED, FOR SOLUTION INTRAVENOUS at 05:14

## 2023-07-18 RX ADMIN — SODIUM CHLORIDE 500 MILLILITER(S): 9 INJECTION, SOLUTION INTRAVENOUS at 10:09

## 2023-07-18 RX ADMIN — Medication 20 MILLIGRAM(S): at 12:55

## 2023-07-18 RX ADMIN — PIPERACILLIN AND TAZOBACTAM 25 GRAM(S): 4; .5 INJECTION, POWDER, LYOPHILIZED, FOR SOLUTION INTRAVENOUS at 14:19

## 2023-07-18 RX ADMIN — ONDANSETRON 4 MILLIGRAM(S): 8 TABLET, FILM COATED ORAL at 03:12

## 2023-07-18 RX ADMIN — ENOXAPARIN SODIUM 40 MILLIGRAM(S): 100 INJECTION SUBCUTANEOUS at 05:17

## 2023-07-18 RX ADMIN — Medication 12.5 MILLIGRAM(S): at 19:35

## 2023-07-18 RX ADMIN — Medication 200 MILLIGRAM(S): at 19:34

## 2023-07-18 RX ADMIN — Medication 5 MILLIGRAM(S): at 05:34

## 2023-07-18 RX ADMIN — POTASSIUM PHOSPHATE, MONOBASIC POTASSIUM PHOSPHATE, DIBASIC 83.33 MILLIMOLE(S): 236; 224 INJECTION, SOLUTION INTRAVENOUS at 12:55

## 2023-07-18 RX ADMIN — PIPERACILLIN AND TAZOBACTAM 25 GRAM(S): 4; .5 INJECTION, POWDER, LYOPHILIZED, FOR SOLUTION INTRAVENOUS at 22:15

## 2023-07-18 RX ADMIN — ONDANSETRON 4 MILLIGRAM(S): 8 TABLET, FILM COATED ORAL at 09:14

## 2023-07-18 NOTE — PHYSICAL THERAPY INITIAL EVALUATION ADULT - ADDITIONAL COMMENTS
Pt lives in a house with 2  steps to enter with  rails and 12 stairs inside with  rails.  Pt owns medical equipment: SAC, RW, Shower Chair   Pt lives with: Daughter   Someone is always available to provide assist.

## 2023-07-18 NOTE — PHYSICAL THERAPY INITIAL EVALUATION ADULT - ACTIVE RANGE OF MOTION EXAMINATION, REHAB EVAL
Right LE painful in groin with heip flexion in supine/bilateral upper extremity Active ROM was WFL (within functional limits)/bilateral  lower extremity Active ROM was WFL (within functional limits)/deficits as listed below

## 2023-07-18 NOTE — PROGRESS NOTE ADULT - ASSESSMENT
A/P: 78y old  Female HTN, HLD, Seizures, CAD 10 years ago, family hx colon cancer, left hip replacement 2022. Presents with perforated diverticulitis and pneumoperitoneum. Cardiology consulted for RCRI. Endorses a normal NST three months ago in outpatient office. Also endorses she has a "leaky valve" unsure which one.  Has chronic COOPER which is unchanged.       07/18: Patient underwent an ex-lap and Fuentes's on 07/15/23 requiring pressor support and PRBC and platelet transfusions. Patient also with worsening leukocytosis and nausea/vomiting. NGT placed.

## 2023-07-18 NOTE — CHART NOTE - NSCHARTNOTEFT_GEN_A_CORE
Pt febrile to 100.5 tachy to 110s, pt also c/o nausea and had a few episodes of vomiting overnight. Pt is seen and examined, c/o nausea still with minimal relief with anti nausea medications. Her abdomen is soft, mildly distended, ostomy is pink with stool and gas output.   --> HOB elevated, NGT placed, stat labs, stat ekg, stat cxr, ua ordered. will f/u Pt febrile to 100.5 tachy to 110s, pt also c/o nausea and had a few episodes of vomiting overnight. Pt is seen and examined, c/o nausea still with minimal relief with anti nausea medications. Denies sob, dyspnea, chest pain. Her abdomen is soft, mildly distended, ostomy is pink with stool and gas output.   --> HOB elevated, NGT placed, stat labs, stat ekg, stat cxr, ua ordered. will f/u    Pt also noted to have b/l LE edema, her home lasix was restarted.    ADDENDUM @ 1034:   EKG reviewed, sinus tachycardia.   CXR reviewed, NGT in good place   labs and ua still pending Pt febrile to 100.5 tachy to 110s, pt also c/o nausea and had a few episodes of vomiting overnight. Pt is seen and examined, c/o nausea still with minimal relief with anti nausea medications. Denies sob, dyspnea, chest pain. Her abdomen is soft, mildly distended, ostomy is pink with stool and gas output.   --> HOB elevated, NGT placed, stat labs, stat ekg, stat cxr, ua ordered. will f/u    Pt also noted to have b/l LE edema, her home lasix 1x dose ordered.     ADDENDUM @ 1034:   EKG reviewed, sinus tachycardia.   CXR reviewed, NGT in good place   labs and ua still pending

## 2023-07-18 NOTE — PROGRESS NOTE ADULT - ASSESSMENT
78 year old M, HTN, HLD, Seizures, CAD with one stent on plavix, h/o UTI's presenting with LLQ abdominal pain since July 5th, no history of diverticulitis, she has a family history of colon cancer in her brother, last colonoscopy was over 30 years ago, denies fever or chills, she has been nauseous but denies vomiting.  No additional complaints.  She was transferred from [facility] for a higher level of care. Admitted for perforated diverticulitis with pneumoperitoneum    Perforated diverticulitis  Pneumoperitoneum  Leukocytosis  Diarrhea  Hypokalemia  Anemia    - f/u BCX ngtd  - c diff (-)  - gi pcr (-)  - pt underwent repeat cta/p 7/14 due to leukocytosis and was found to have developing perisigmoid abscess  - now s/p 7/15/23 exp lap, hartmanns procedure, found to have feculent peritonitis and pelvic abscess  - post op hypotension suspected due to acute blood loss anemia secondary to  bleeding vein at colostomy which was cauterized   - OR cx + polymicrobial with klebisella, f/u final ID +S    - on 7/18 developed vomiting and post op ileus  - Ua (-)  - bcx repeated 7/18  - Continue zosyn for now pending clinical course  - procalcitonin may be falsely elevated after surgery  - Trend Fever  - Trend Leukocytosis, elevated  - trend h/h      d/w core lab, RN, daughter  Will Follow

## 2023-07-18 NOTE — PROGRESS NOTE ADULT - NS ATTEND AMEND GEN_ALL_CORE FT
Patient was seen and examined at bedside and notes to be doing well with no complaints of any chest pain or shortness of breath   Patient previsouly seen for cardiac clearance Patient was seen and examined at bedside and notes to be doing well with no complaints of any chest pain or shortness of breath   Patient previously seen for cardiac clearance and TTE done shows normal LVEF with no evidence of wall motion abnormalities; reconsulted for volume overload and this is post op fluid overload and also secondary to hypoalbuminemia    CXR: 1. Clear lungs with no acute cardiopulmonary abnormalities. The nasogastric tube extends to at least the level of the diaphragm however cannot be clearly demonstrated beyond this region due to patient's body habitus and technical considerations. Consider follow-up with abdominal radiography.    78y old  Female HTN, HLD, Seizures, CAD 10 years ago, family hx colon cancer, left hip replacement 2022. Presents with perforated diverticulitis and pneumoperitoneum. Cardiology consulted for RCRI. Endorses a normal NST three months ago in outpatient office; consulted for volume overload in the setting of hypoalbuminemia and post op   - CXR reviewed and no signs of volume overload and appears to not be overtly volume overload   - s/p Lasix 20mg IVP and thus would recommend to do Lasix 40mg IVP x 2 doses   - patient is post op and occasionally tachycardic and 91 % on RA would recommend to obtain CTA pulmonary arteries to assess for any PE   - check albumin level this may be causing third spacing and not due to any heart failure and try albumin for fluid resuscitation     No Warren D.O. [facility]  Cardiology/Vascular Cardiology -[facility]   Telephone # [phone]

## 2023-07-19 LAB
ALBUMIN SERPL ELPH-MCNC: 2.2 G/DL — LOW (ref 3.3–5.2)
ANION GAP SERPL CALC-SCNC: 10 MMOL/L — SIGNIFICANT CHANGE UP (ref 5–17)
BASOPHILS # BLD AUTO: 0.1 K/UL — SIGNIFICANT CHANGE UP (ref 0–0.2)
BASOPHILS NFR BLD AUTO: 0.4 % — SIGNIFICANT CHANGE UP (ref 0–2)
BUN SERPL-MCNC: 9.6 MG/DL — SIGNIFICANT CHANGE UP (ref 8–20)
CALCIUM SERPL-MCNC: 7.8 MG/DL — LOW (ref 8.4–10.5)
CHLORIDE SERPL-SCNC: 105 MMOL/L — SIGNIFICANT CHANGE UP (ref 96–108)
CO2 SERPL-SCNC: 30 MMOL/L — HIGH (ref 22–29)
CREAT SERPL-MCNC: 0.53 MG/DL — SIGNIFICANT CHANGE UP (ref 0.5–1.3)
CULTURE RESULTS: SIGNIFICANT CHANGE UP
EGFR: 96 ML/MIN/1.73M2 — SIGNIFICANT CHANGE UP
EOSINOPHIL # BLD AUTO: 0.15 K/UL — SIGNIFICANT CHANGE UP (ref 0–0.5)
EOSINOPHIL NFR BLD AUTO: 0.6 % — SIGNIFICANT CHANGE UP (ref 0–6)
GLUCOSE BLDC GLUCOMTR-MCNC: 101 MG/DL — HIGH (ref 70–99)
GLUCOSE BLDC GLUCOMTR-MCNC: 119 MG/DL — HIGH (ref 70–99)
GLUCOSE BLDC GLUCOMTR-MCNC: 90 MG/DL — SIGNIFICANT CHANGE UP (ref 70–99)
GLUCOSE BLDC GLUCOMTR-MCNC: 92 MG/DL — SIGNIFICANT CHANGE UP (ref 70–99)
GLUCOSE BLDC GLUCOMTR-MCNC: 97 MG/DL — SIGNIFICANT CHANGE UP (ref 70–99)
GLUCOSE SERPL-MCNC: 104 MG/DL — HIGH (ref 70–99)
HCT VFR BLD CALC: 28 % — LOW (ref 34.5–45)
HGB BLD-MCNC: 9 G/DL — LOW (ref 11.5–15.5)
IMM GRANULOCYTES NFR BLD AUTO: 5.9 % — HIGH (ref 0–0.9)
LYMPHOCYTES # BLD AUTO: 1.27 K/UL — SIGNIFICANT CHANGE UP (ref 1–3.3)
LYMPHOCYTES # BLD AUTO: 5.5 % — LOW (ref 13–44)
MAGNESIUM SERPL-MCNC: 2.1 MG/DL — SIGNIFICANT CHANGE UP (ref 1.6–2.6)
MCHC RBC-ENTMCNC: 31.1 PG — SIGNIFICANT CHANGE UP (ref 27–34)
MCHC RBC-ENTMCNC: 32.1 GM/DL — SIGNIFICANT CHANGE UP (ref 32–36)
MCV RBC AUTO: 96.9 FL — SIGNIFICANT CHANGE UP (ref 80–100)
MONOCYTES # BLD AUTO: 1.34 K/UL — HIGH (ref 0–0.9)
MONOCYTES NFR BLD AUTO: 5.8 % — SIGNIFICANT CHANGE UP (ref 2–14)
NEUTROPHILS # BLD AUTO: 19.01 K/UL — HIGH (ref 1.8–7.4)
NEUTROPHILS NFR BLD AUTO: 81.8 % — HIGH (ref 43–77)
PHOSPHATE SERPL-MCNC: 3.5 MG/DL — SIGNIFICANT CHANGE UP (ref 2.4–4.7)
PLATELET # BLD AUTO: 550 K/UL — HIGH (ref 150–400)
POTASSIUM SERPL-MCNC: 3.8 MMOL/L — SIGNIFICANT CHANGE UP (ref 3.5–5.3)
POTASSIUM SERPL-SCNC: 3.8 MMOL/L — SIGNIFICANT CHANGE UP (ref 3.5–5.3)
RBC # BLD: 2.89 M/UL — LOW (ref 3.8–5.2)
RBC # FLD: 15.9 % — HIGH (ref 10.3–14.5)
SODIUM SERPL-SCNC: 144 MMOL/L — SIGNIFICANT CHANGE UP (ref 135–145)
WBC # BLD: 23.24 K/UL — HIGH (ref 3.8–10.5)
WBC # FLD AUTO: 23.24 K/UL — HIGH (ref 3.8–10.5)

## 2023-07-19 PROCEDURE — 93010 ELECTROCARDIOGRAM REPORT: CPT

## 2023-07-19 PROCEDURE — 93970 EXTREMITY STUDY: CPT | Mod: 26

## 2023-07-19 PROCEDURE — 99232 SBSQ HOSP IP/OBS MODERATE 35: CPT

## 2023-07-19 RX ADMIN — PIPERACILLIN AND TAZOBACTAM 25 GRAM(S): 4; .5 INJECTION, POWDER, LYOPHILIZED, FOR SOLUTION INTRAVENOUS at 16:59

## 2023-07-19 RX ADMIN — Medication 200 MILLIGRAM(S): at 06:12

## 2023-07-19 RX ADMIN — Medication 12.5 MILLIGRAM(S): at 17:10

## 2023-07-19 RX ADMIN — ENOXAPARIN SODIUM 40 MILLIGRAM(S): 100 INJECTION SUBCUTANEOUS at 06:11

## 2023-07-19 RX ADMIN — POTASSIUM PHOSPHATE, MONOBASIC POTASSIUM PHOSPHATE, DIBASIC 83.33 MILLIMOLE(S): 236; 224 INJECTION, SOLUTION INTRAVENOUS at 00:16

## 2023-07-19 RX ADMIN — PANTOPRAZOLE SODIUM 40 MILLIGRAM(S): 20 TABLET, DELAYED RELEASE ORAL at 13:26

## 2023-07-19 RX ADMIN — Medication 40 MILLIGRAM(S): at 06:10

## 2023-07-19 RX ADMIN — Medication 200 MILLIGRAM(S): at 17:06

## 2023-07-19 RX ADMIN — Medication 12.5 MILLIGRAM(S): at 06:13

## 2023-07-19 RX ADMIN — Medication 30 MILLIGRAM(S): at 13:25

## 2023-07-19 RX ADMIN — PIPERACILLIN AND TAZOBACTAM 25 GRAM(S): 4; .5 INJECTION, POWDER, LYOPHILIZED, FOR SOLUTION INTRAVENOUS at 06:08

## 2023-07-19 RX ADMIN — SODIUM CHLORIDE 42 MILLILITER(S): 9 INJECTION, SOLUTION INTRAVENOUS at 17:12

## 2023-07-19 RX ADMIN — PIPERACILLIN AND TAZOBACTAM 25 GRAM(S): 4; .5 INJECTION, POWDER, LYOPHILIZED, FOR SOLUTION INTRAVENOUS at 22:33

## 2023-07-19 NOTE — PROGRESS NOTE ADULT - NS ATTEND AMEND GEN_ALL_CORE FT
Patient had CTA pulmonary artery done which showed no evidence of PE and no evidence of pleural effusion   TTE already done in the hospital course and normal LVEF with no significant valvulopathy   status post Lasix 40mg IVp x 2 doses  Albumin of 2.2, this may be due to nutritional status; consider albumin if resuscitation is needed rather than IV fluids   Occasional hypoxia could also be due to BENY vs. obesity hypoventilation syndrome.     No further cardiac work up will sign off     No Warren D.O. [facility]  Cardiology/Vascular Cardiology -[facility]   Telephone # [phone]

## 2023-07-19 NOTE — PROGRESS NOTE ADULT - PROBLEM SELECTOR PLAN 1
- Patient with some increasing LE and hand swelling.   - In setting of IV fluids, PRBC, and platelet transfusions.   - Patient also with albumin of 2.7.   - Echo with normal EF, no significant valvular abnormalities.   - Check LE venous duplex to r/o DVT.   - Obtain CTA chest to r/o PE and also evaluate for any pulmonary vascular congestion.   - Lasix 40mg IV daily x 2 doses ordered.   - Avoid excess fluids.   - Can try albumin if fluid resuscitation is needed.
: - Patient with some increasing LE and hand swelling.   - In setting of IV fluids, PRBC, and platelet transfusions.   - Patient also with albumin of 2.7.   - Echo with normal EF, no significant valvular abnormalities.   - Check LE venous duplex to r/o DVT.   - CTA with no PE or pulmonary vascular congestion.   - Occasional hypoxia could also be due to BENY vs. obesity hypoventilation syndrome.   - Lasix 40mg IV daily x 2 doses ordered.   - Avoid excess fluids.   - Can try albumin if fluid resuscitation is needed.  - Will sign off at this time, please feel free to call with any questions or issues that may arise.

## 2023-07-19 NOTE — PROGRESS NOTE ADULT - ASSESSMENT
78F with perforated diverticulitis s/p Hartmanns. Received lasix yesterday, another dose today. NGT output 1300. Her nausea and vomiting has resolved.    PLAN  -NPO, IVF  -NGT, keep in place, monitor output  -Daily wet to dry dressing change, changed today  -Plan for wound vac today  -Lasix 40 today  -Monitor ostomy and drain outputs  -f/u labs

## 2023-07-19 NOTE — PROGRESS NOTE ADULT - ASSESSMENT
78 year old M, HTN, HLD, Seizures, CAD with one stent on plavix, h/o UTI's presenting with LLQ abdominal pain since July 5th, no history of diverticulitis, she has a family history of colon cancer in her brother, last colonoscopy was over 30 years ago, denies fever or chills, she has been nauseous but denies vomiting.  No additional complaints.  She was transferred from [facility] for a higher level of care. Admitted for perforated diverticulitis with pneumoperitoneum    Perforated diverticulitis  Pneumoperitoneum  Leukocytosis  Diarrhea  Hypokalemia  Anemia    - f/u BCX ngtd  - c diff (-)  - gi pcr (-)  - pt underwent repeat cta/p 7/14 due to leukocytosis and was found to have developing perisigmoid abscess  - now s/p 7/15/23 exp lap, hartmanns procedure, found to have feculent peritonitis and pelvic abscess  - post op hypotension suspected due to acute blood loss anemia secondary to  bleeding vein at colostomy which was cauterized   - OR cx + polymicrobial with klebisella, f/u final ID +S    - on 7/18 developed vomiting and post op ileus  - Ua (-)  - bcx repeated 7/18  - Continue zosyn for now pending clinical course  - procalcitonin may be falsely elevated after surgery  - Trend Fever  - Trend Leukocytosis, elevated  - trend h/h        Will Follow

## 2023-07-20 LAB
-  AMIKACIN: SIGNIFICANT CHANGE UP
-  AMOXICILLIN/CLAVULANIC ACID: SIGNIFICANT CHANGE UP
-  AMPICILLIN/SULBACTAM: SIGNIFICANT CHANGE UP
-  AMPICILLIN: SIGNIFICANT CHANGE UP
-  AZTREONAM: SIGNIFICANT CHANGE UP
-  CEFAZOLIN: SIGNIFICANT CHANGE UP
-  CEFEPIME: SIGNIFICANT CHANGE UP
-  CEFOXITIN: SIGNIFICANT CHANGE UP
-  CEFTRIAXONE: SIGNIFICANT CHANGE UP
-  CIPROFLOXACIN: SIGNIFICANT CHANGE UP
-  ERTAPENEM: SIGNIFICANT CHANGE UP
-  GENTAMICIN: SIGNIFICANT CHANGE UP
-  IMIPENEM: SIGNIFICANT CHANGE UP
-  LEVOFLOXACIN: SIGNIFICANT CHANGE UP
-  MEROPENEM: SIGNIFICANT CHANGE UP
-  PIPERACILLIN/TAZOBACTAM: SIGNIFICANT CHANGE UP
-  TOBRAMYCIN: SIGNIFICANT CHANGE UP
-  TRIMETHOPRIM/SULFAMETHOXAZOLE: SIGNIFICANT CHANGE UP
ANION GAP SERPL CALC-SCNC: 13 MMOL/L — SIGNIFICANT CHANGE UP (ref 5–17)
BASOPHILS # BLD AUTO: 0.09 K/UL — SIGNIFICANT CHANGE UP (ref 0–0.2)
BASOPHILS NFR BLD AUTO: 0.3 % — SIGNIFICANT CHANGE UP (ref 0–2)
BUN SERPL-MCNC: 10.4 MG/DL — SIGNIFICANT CHANGE UP (ref 8–20)
CALCIUM SERPL-MCNC: 7.8 MG/DL — LOW (ref 8.4–10.5)
CHLORIDE SERPL-SCNC: 100 MMOL/L — SIGNIFICANT CHANGE UP (ref 96–108)
CO2 SERPL-SCNC: 28 MMOL/L — SIGNIFICANT CHANGE UP (ref 22–29)
CREAT SERPL-MCNC: 0.53 MG/DL — SIGNIFICANT CHANGE UP (ref 0.5–1.3)
EGFR: 96 ML/MIN/1.73M2 — SIGNIFICANT CHANGE UP
EOSINOPHIL # BLD AUTO: 0.17 K/UL — SIGNIFICANT CHANGE UP (ref 0–0.5)
EOSINOPHIL NFR BLD AUTO: 0.6 % — SIGNIFICANT CHANGE UP (ref 0–6)
GLUCOSE BLDC GLUCOMTR-MCNC: 80 MG/DL — SIGNIFICANT CHANGE UP (ref 70–99)
GLUCOSE BLDC GLUCOMTR-MCNC: 88 MG/DL — SIGNIFICANT CHANGE UP (ref 70–99)
GLUCOSE SERPL-MCNC: 90 MG/DL — SIGNIFICANT CHANGE UP (ref 70–99)
HCT VFR BLD CALC: 30.7 % — LOW (ref 34.5–45)
HGB BLD-MCNC: 9.5 G/DL — LOW (ref 11.5–15.5)
IMM GRANULOCYTES NFR BLD AUTO: 4.7 % — HIGH (ref 0–0.9)
LYMPHOCYTES # BLD AUTO: 1.19 K/UL — SIGNIFICANT CHANGE UP (ref 1–3.3)
LYMPHOCYTES # BLD AUTO: 4.5 % — LOW (ref 13–44)
MAGNESIUM SERPL-MCNC: 2.1 MG/DL — SIGNIFICANT CHANGE UP (ref 1.6–2.6)
MCHC RBC-ENTMCNC: 30.9 GM/DL — LOW (ref 32–36)
MCHC RBC-ENTMCNC: 31.1 PG — SIGNIFICANT CHANGE UP (ref 27–34)
MCV RBC AUTO: 100.7 FL — HIGH (ref 80–100)
METHOD TYPE: SIGNIFICANT CHANGE UP
MONOCYTES # BLD AUTO: 1.75 K/UL — HIGH (ref 0–0.9)
MONOCYTES NFR BLD AUTO: 6.6 % — SIGNIFICANT CHANGE UP (ref 2–14)
NEUTROPHILS # BLD AUTO: 22.21 K/UL — HIGH (ref 1.8–7.4)
NEUTROPHILS NFR BLD AUTO: 83.3 % — HIGH (ref 43–77)
ORGANISM # SPEC MICROSCOPIC CNT: SIGNIFICANT CHANGE UP
PHOSPHATE SERPL-MCNC: 2.5 MG/DL — SIGNIFICANT CHANGE UP (ref 2.4–4.7)
PLATELET # BLD AUTO: 584 K/UL — HIGH (ref 150–400)
POTASSIUM SERPL-MCNC: 3.6 MMOL/L — SIGNIFICANT CHANGE UP (ref 3.5–5.3)
POTASSIUM SERPL-SCNC: 3.6 MMOL/L — SIGNIFICANT CHANGE UP (ref 3.5–5.3)
RBC # BLD: 3.05 M/UL — LOW (ref 3.8–5.2)
RBC # FLD: 15.1 % — HIGH (ref 10.3–14.5)
SODIUM SERPL-SCNC: 141 MMOL/L — SIGNIFICANT CHANGE UP (ref 135–145)
WBC # BLD: 26.67 K/UL — HIGH (ref 3.8–10.5)
WBC # FLD AUTO: 26.67 K/UL — HIGH (ref 3.8–10.5)

## 2023-07-20 PROCEDURE — 99232 SBSQ HOSP IP/OBS MODERATE 35: CPT

## 2023-07-20 RX ORDER — CASPOFUNGIN ACETATE 7 MG/ML
50 INJECTION, POWDER, LYOPHILIZED, FOR SOLUTION INTRAVENOUS EVERY 24 HOURS
Refills: 0 | Status: DISCONTINUED | OUTPATIENT
Start: 2023-07-21 | End: 2023-07-26

## 2023-07-20 RX ORDER — ACETAMINOPHEN 500 MG
975 TABLET ORAL EVERY 6 HOURS
Refills: 0 | Status: DISCONTINUED | OUTPATIENT
Start: 2023-07-20 | End: 2023-07-26

## 2023-07-20 RX ORDER — POTASSIUM PHOSPHATE, MONOBASIC POTASSIUM PHOSPHATE, DIBASIC 236; 224 MG/ML; MG/ML
30 INJECTION, SOLUTION INTRAVENOUS ONCE
Refills: 0 | Status: COMPLETED | OUTPATIENT
Start: 2023-07-20 | End: 2023-07-20

## 2023-07-20 RX ORDER — CASPOFUNGIN ACETATE 7 MG/ML
70 INJECTION, POWDER, LYOPHILIZED, FOR SOLUTION INTRAVENOUS ONCE
Refills: 0 | Status: COMPLETED | OUTPATIENT
Start: 2023-07-20 | End: 2023-07-20

## 2023-07-20 RX ORDER — CASPOFUNGIN ACETATE 7 MG/ML
INJECTION, POWDER, LYOPHILIZED, FOR SOLUTION INTRAVENOUS
Refills: 0 | Status: DISCONTINUED | OUTPATIENT
Start: 2023-07-20 | End: 2023-07-26

## 2023-07-20 RX ADMIN — PANTOPRAZOLE SODIUM 40 MILLIGRAM(S): 20 TABLET, DELAYED RELEASE ORAL at 12:32

## 2023-07-20 RX ADMIN — ATORVASTATIN CALCIUM 40 MILLIGRAM(S): 80 TABLET, FILM COATED ORAL at 21:47

## 2023-07-20 RX ADMIN — ENOXAPARIN SODIUM 40 MILLIGRAM(S): 100 INJECTION SUBCUTANEOUS at 06:35

## 2023-07-20 RX ADMIN — Medication 12.5 MILLIGRAM(S): at 06:34

## 2023-07-20 RX ADMIN — Medication 40 MILLIGRAM(S): at 06:34

## 2023-07-20 RX ADMIN — PIPERACILLIN AND TAZOBACTAM 25 GRAM(S): 4; .5 INJECTION, POWDER, LYOPHILIZED, FOR SOLUTION INTRAVENOUS at 06:21

## 2023-07-20 RX ADMIN — Medication 30 MILLIGRAM(S): at 12:33

## 2023-07-20 RX ADMIN — PIPERACILLIN AND TAZOBACTAM 25 GRAM(S): 4; .5 INJECTION, POWDER, LYOPHILIZED, FOR SOLUTION INTRAVENOUS at 14:27

## 2023-07-20 RX ADMIN — CASPOFUNGIN ACETATE 260 MILLIGRAM(S): 7 INJECTION, POWDER, LYOPHILIZED, FOR SOLUTION INTRAVENOUS at 21:47

## 2023-07-20 RX ADMIN — Medication 200 MILLIGRAM(S): at 17:08

## 2023-07-20 RX ADMIN — POTASSIUM PHOSPHATE, MONOBASIC POTASSIUM PHOSPHATE, DIBASIC 83.33 MILLIMOLE(S): 236; 224 INJECTION, SOLUTION INTRAVENOUS at 14:27

## 2023-07-20 RX ADMIN — Medication 200 MILLIGRAM(S): at 06:37

## 2023-07-20 RX ADMIN — PIPERACILLIN AND TAZOBACTAM 25 GRAM(S): 4; .5 INJECTION, POWDER, LYOPHILIZED, FOR SOLUTION INTRAVENOUS at 21:47

## 2023-07-20 RX ADMIN — SODIUM CHLORIDE 42 MILLILITER(S): 9 INJECTION, SOLUTION INTRAVENOUS at 14:28

## 2023-07-20 NOTE — ADVANCED PRACTICE NURSE CONSULT - RECOMMEDATIONS
Pouch change: 	  -Gently remove pouch water moistened gauze   -Cleanse stoma site with water moistened gauze, gently pat dry  -Measure stoma, currently   -Trace and cut current size on Rolla 2 ¾” CeraPlus CONVEXITY barrier (#74589)  -Stretch Rolla Adapt Oval Convex CeraRing Barrier Ring (#[id number]) onto back of barrier  -Apply barrier to patient's skin  -Attach Rolla 2 ¾” drainable lock and roll pouch (#[id number]) onto barrier  -Empty pouch when 1/3 to no more than 1/2 full of effluent/flatus. Change pouching system q 3 - 4 days and prn for leaking. Next pouch change-     Plan of Care: Patient's d/c pending at this time. WOCN service to follow-up w/ patient for pouch change and additional ostomy teaching. Questions or concerns or pouch w/ consistent leakage and unable to obtain seal, please consult WOCN. If pouch leaks after WOC service hours, please repouch using supplies and technique as indicated above and document where leakage occurred so system can be modified by WOCN if needed.   Pouch change: 	  -Gently remove pouch water moistened gauze   -Cleanse stoma site with water moistened gauze, gently pat dry  -Measure stoma, currently 25mm x 44mm (oval in shape)  -Trace and cut current size on Alise 2 ¾” CeraPlus CONVEXITY barrier (#78681)  -Stretch [place] Adapt Oval Convex CeraRing Barrier Ring (#[id number]) onto back of barrier  -Apply barrier to patient's skin  -Attach [place] 2 ¾” drainable lock and roll pouch (#[id number]) onto barrier  -Empty pouch when 1/3 to no more than 1/2 full of effluent/flatus. Change pouching system q 3 - 4 days and prn for leaking. Next pouch change-     Plan of Care: Patient's d/c pending at this time. WOCN service to follow-up w/ patient for pouch change and additional ostomy teaching. Questions or concerns or pouch w/ consistent leakage and unable to obtain seal, please consult WOCN. If pouch leaks after WOC service hours, please repouch using supplies and technique as indicated above and document where leakage occurred so system can be modified by WOCN if needed.

## 2023-07-20 NOTE — CHART NOTE - NSCHARTNOTEFT_GEN_A_CORE
NGT clamped x4 hours, patient with no nausea/vomiting during this trial  NGT re-hooked to suction with minimal drainage  NGT removed, patient tolerated well  Advancing diet to CLD and will monitor tolerance

## 2023-07-20 NOTE — PROGRESS NOTE ADULT - ASSESSMENT
78 year old M, HTN, HLD, Seizures, CAD with one stent on plavix, h/o UTI's presenting with LLQ abdominal pain since July 5th, no history of diverticulitis, she has a family history of colon cancer in her brother, last colonoscopy was over 30 years ago, denies fever or chills, she has been nauseous but denies vomiting.  No additional complaints.  She was transferred from [facility] for a higher level of care. Admitted for perforated diverticulitis with pneumoperitoneum    Perforated diverticulitis  Pneumoperitoneum  Leukocytosis  Diarrhea  Hypokalemia  Anemia    - f/u BCX ngtd  - c diff (-)  - gi pcr (-)  - pt underwent repeat cta/p 7/14 due to leukocytosis and was found to have developing perisigmoid abscess  - now s/p 7/15/23 exp lap, hartmanns procedure, found to have feculent peritonitis and pelvic abscess  - post op hypotension suspected due to acute blood loss anemia secondary to  bleeding vein at colostomy which was cauterized   - OR cx + polymicrobial with klebsiella, e.coli and bacteriodes, also c/albicans    - on 7/18 developed vomiting and post op ileus  - Ua (-)  - bcx repeated 7/18 ngtd  - Continue zosyn for now pending clinical course  - add caspofungin given leukocytosis  - procalcitonin may be falsely elevated after surgery  - Trend Fever  - Trend Leukocytosis, elevated  - monitor drain output  - trend h/h    d/w Rn, pt and CRS    Will Follow

## 2023-07-20 NOTE — ADVANCED PRACTICE NURSE CONSULT - ASSESSMENT
Received patient sitting up in bed, awake, made aware of purpose of WOCN, agreeable to pouch change & ostomy teaching. Patient daughter at bedside.  Alise drainable pouching system applied in place, barrier intact, no leakage. Pouch gently removed from pt's abdomen w/ water moistened gauze.     Type of ostomy: End Colostomy   Location: LLQ  Size: oval in shape, unable to be rounded out, now measuring [id number]  Mucosa: red, moist, remains edematous & slightly translucent   Peristomal skin: clean moist intact  Mucocutaneous junction: intact  Abdominal contours: soft with valley or saucered area creating a flush stoma  Output: 100mL liquid brown effluence  Midline/lap sites/ drains: RLQ LEV drain with approx 50mL sanguinous fluid    Patient re-pouched w/ Reedy 2 ¾” CeraPlus flat barrier #[id number] (cut to [id number] ) Reedy  2 ¾” drainable pouch w/ lock & roll closure #[id number] OR Reedy Alise 2 ¼" drainable pouch w/ lock & roll closure  #[id number].  Full ostomy lesson provided to patient and daughter who wittness entire wafer and pouch change.     Impression:  given pt's stomal characteristics and abdominal topography, pt still requires CONVEXITY pouching system to protect peristomal skin w/ shrinking post-op stomal size    Patient and daughter ready & willing to learn ostomy care this visit, observed entire pouch change, looking directly at stoma, asking  appropriate questions & able to verbalize key ostomy points. Patient observed pouch opening/closing, again reviewed w/ patient that her stool consistency & frequency of Colostomy will require her to empty pouch several times each day initially & demonstrated practicing pouch opening/closing & emptying as this skill is required prior to d/c home, patient and daughter agreeable to start practicing pouch emptying and demonstrated with WOCN observation.     Reviewed with patient dietary restrictions, s/s & prevention of food obstruction & constipation. Also reviewed w/ patient lifestyle modifications (clothing, bathing/showering, physical activity). Written information regarding all above topics provided to patient.        Patient enrolled in [employer] [facility] [employer]

## 2023-07-21 LAB
ANION GAP SERPL CALC-SCNC: 12 MMOL/L — SIGNIFICANT CHANGE UP (ref 5–17)
ANION GAP SERPL CALC-SCNC: 8 MMOL/L — SIGNIFICANT CHANGE UP (ref 5–17)
BASOPHILS # BLD AUTO: 0 K/UL — SIGNIFICANT CHANGE UP (ref 0–0.2)
BASOPHILS NFR BLD AUTO: 0 % — SIGNIFICANT CHANGE UP (ref 0–2)
BUN SERPL-MCNC: 6.3 MG/DL — LOW (ref 8–20)
BUN SERPL-MCNC: 6.5 MG/DL — LOW (ref 8–20)
BURR CELLS BLD QL SMEAR: PRESENT — SIGNIFICANT CHANGE UP
CALCIUM SERPL-MCNC: 7.5 MG/DL — LOW (ref 8.4–10.5)
CHLORIDE SERPL-SCNC: 101 MMOL/L — SIGNIFICANT CHANGE UP (ref 96–108)
CO2 SERPL-SCNC: 27 MMOL/L — SIGNIFICANT CHANGE UP (ref 22–29)
CO2 SERPL-SCNC: 29 MMOL/L — SIGNIFICANT CHANGE UP (ref 22–29)
CREAT SERPL-MCNC: 0.5 MG/DL — SIGNIFICANT CHANGE UP (ref 0.5–1.3)
CREAT SERPL-MCNC: 0.51 MG/DL — SIGNIFICANT CHANGE UP (ref 0.5–1.3)
EGFR: 97 ML/MIN/1.73M2 — SIGNIFICANT CHANGE UP
EOSINOPHIL # BLD AUTO: 0.35 K/UL — SIGNIFICANT CHANGE UP (ref 0–0.5)
EOSINOPHIL NFR BLD AUTO: 1.7 % — SIGNIFICANT CHANGE UP (ref 0–6)
GIANT PLATELETS BLD QL SMEAR: PRESENT — SIGNIFICANT CHANGE UP
GLUCOSE BLDC GLUCOMTR-MCNC: 108 MG/DL — HIGH (ref 70–99)
GLUCOSE BLDC GLUCOMTR-MCNC: 113 MG/DL — HIGH (ref 70–99)
GLUCOSE SERPL-MCNC: 108 MG/DL — HIGH (ref 70–99)
GLUCOSE SERPL-MCNC: 127 MG/DL — HIGH (ref 70–99)
HCT VFR BLD CALC: 24.9 % — LOW (ref 34.5–45)
HGB BLD-MCNC: 7.8 G/DL — LOW (ref 11.5–15.5)
LYMPHOCYTES # BLD AUTO: 1.43 K/UL — SIGNIFICANT CHANGE UP (ref 1–3.3)
LYMPHOCYTES # BLD AUTO: 7 % — LOW (ref 13–44)
MAGNESIUM SERPL-MCNC: 1.8 MG/DL — SIGNIFICANT CHANGE UP (ref 1.6–2.6)
MANUAL SMEAR VERIFICATION: SIGNIFICANT CHANGE UP
MCHC RBC-ENTMCNC: 30.6 PG — SIGNIFICANT CHANGE UP (ref 27–34)
MCHC RBC-ENTMCNC: 31.3 GM/DL — LOW (ref 32–36)
MCV RBC AUTO: 97.6 FL — SIGNIFICANT CHANGE UP (ref 80–100)
MONOCYTES # BLD AUTO: 1.06 K/UL — HIGH (ref 0–0.9)
MONOCYTES NFR BLD AUTO: 5.2 % — SIGNIFICANT CHANGE UP (ref 2–14)
MYELOCYTES NFR BLD: 0.9 % — HIGH (ref 0–0)
NEUTROPHILS # BLD AUTO: 17.18 K/UL — HIGH (ref 1.8–7.4)
NEUTROPHILS NFR BLD AUTO: 84.3 % — HIGH (ref 43–77)
PHOSPHATE SERPL-MCNC: 2.2 MG/DL — LOW (ref 2.4–4.7)
PLAT MORPH BLD: NORMAL — SIGNIFICANT CHANGE UP
PLATELET # BLD AUTO: 586 K/UL — HIGH (ref 150–400)
POIKILOCYTOSIS BLD QL AUTO: SIGNIFICANT CHANGE UP
POLYCHROMASIA BLD QL SMEAR: SLIGHT — SIGNIFICANT CHANGE UP
POTASSIUM SERPL-MCNC: 2.9 MMOL/L — CRITICAL LOW (ref 3.5–5.3)
POTASSIUM SERPL-MCNC: 3.2 MMOL/L — LOW (ref 3.5–5.3)
POTASSIUM SERPL-SCNC: 2.9 MMOL/L — CRITICAL LOW (ref 3.5–5.3)
POTASSIUM SERPL-SCNC: 3.2 MMOL/L — LOW (ref 3.5–5.3)
PROMYELOCYTES # FLD: 0.9 % — HIGH (ref 0–0)
RBC # BLD: 2.55 M/UL — LOW (ref 3.8–5.2)
RBC # FLD: 14.8 % — HIGH (ref 10.3–14.5)
RBC BLD AUTO: ABNORMAL
SODIUM SERPL-SCNC: 138 MMOL/L — SIGNIFICANT CHANGE UP (ref 135–145)
SODIUM SERPL-SCNC: 140 MMOL/L — SIGNIFICANT CHANGE UP (ref 135–145)
SURGICAL PATHOLOGY STUDY: SIGNIFICANT CHANGE UP
WBC # BLD: 20.38 K/UL — HIGH (ref 3.8–10.5)
WBC # FLD AUTO: 20.38 K/UL — HIGH (ref 3.8–10.5)

## 2023-07-21 PROCEDURE — 99232 SBSQ HOSP IP/OBS MODERATE 35: CPT

## 2023-07-21 PROCEDURE — 76830 TRANSVAGINAL US NON-OB: CPT | Mod: 26

## 2023-07-21 PROCEDURE — 99222 1ST HOSP IP/OBS MODERATE 55: CPT

## 2023-07-21 PROCEDURE — 76856 US EXAM PELVIC COMPLETE: CPT | Mod: 26

## 2023-07-21 RX ORDER — POTASSIUM PHOSPHATE, MONOBASIC POTASSIUM PHOSPHATE, DIBASIC 236; 224 MG/ML; MG/ML
30 INJECTION, SOLUTION INTRAVENOUS ONCE
Refills: 0 | Status: COMPLETED | OUTPATIENT
Start: 2023-07-21 | End: 2023-07-21

## 2023-07-21 RX ORDER — SODIUM,POTASSIUM PHOSPHATES 278-250MG
1 POWDER IN PACKET (EA) ORAL ONCE
Refills: 0 | Status: COMPLETED | OUTPATIENT
Start: 2023-07-21 | End: 2023-07-21

## 2023-07-21 RX ADMIN — Medication 30 MILLIGRAM(S): at 11:09

## 2023-07-21 RX ADMIN — Medication 200 MILLIGRAM(S): at 17:59

## 2023-07-21 RX ADMIN — Medication 200 MILLIGRAM(S): at 05:21

## 2023-07-21 RX ADMIN — CASPOFUNGIN ACETATE 260 MILLIGRAM(S): 7 INJECTION, POWDER, LYOPHILIZED, FOR SOLUTION INTRAVENOUS at 17:58

## 2023-07-21 RX ADMIN — Medication 975 MILLIGRAM(S): at 23:55

## 2023-07-21 RX ADMIN — Medication 1 PACKET(S): at 17:58

## 2023-07-21 RX ADMIN — Medication 975 MILLIGRAM(S): at 05:21

## 2023-07-21 RX ADMIN — PIPERACILLIN AND TAZOBACTAM 25 GRAM(S): 4; .5 INJECTION, POWDER, LYOPHILIZED, FOR SOLUTION INTRAVENOUS at 23:42

## 2023-07-21 RX ADMIN — ATORVASTATIN CALCIUM 40 MILLIGRAM(S): 80 TABLET, FILM COATED ORAL at 23:41

## 2023-07-21 RX ADMIN — Medication 12.5 MILLIGRAM(S): at 05:21

## 2023-07-21 RX ADMIN — PANTOPRAZOLE SODIUM 40 MILLIGRAM(S): 20 TABLET, DELAYED RELEASE ORAL at 11:08

## 2023-07-21 RX ADMIN — Medication 975 MILLIGRAM(S): at 11:09

## 2023-07-21 RX ADMIN — PIPERACILLIN AND TAZOBACTAM 25 GRAM(S): 4; .5 INJECTION, POWDER, LYOPHILIZED, FOR SOLUTION INTRAVENOUS at 05:22

## 2023-07-21 RX ADMIN — ENOXAPARIN SODIUM 40 MILLIGRAM(S): 100 INJECTION SUBCUTANEOUS at 05:28

## 2023-07-21 RX ADMIN — Medication 975 MILLIGRAM(S): at 12:00

## 2023-07-21 RX ADMIN — Medication 12.5 MILLIGRAM(S): at 17:59

## 2023-07-21 RX ADMIN — POTASSIUM PHOSPHATE, MONOBASIC POTASSIUM PHOSPHATE, DIBASIC 83.33 MILLIMOLE(S): 236; 224 INJECTION, SOLUTION INTRAVENOUS at 12:20

## 2023-07-21 RX ADMIN — Medication 975 MILLIGRAM(S): at 23:41

## 2023-07-21 RX ADMIN — Medication 975 MILLIGRAM(S): at 17:59

## 2023-07-21 RX ADMIN — PIPERACILLIN AND TAZOBACTAM 25 GRAM(S): 4; .5 INJECTION, POWDER, LYOPHILIZED, FOR SOLUTION INTRAVENOUS at 14:53

## 2023-07-21 RX ADMIN — Medication 975 MILLIGRAM(S): at 05:39

## 2023-07-21 NOTE — CONSULT NOTE ADULT - REASON FOR ADMISSION
Perforated Diverticulitis

## 2023-07-21 NOTE — PROGRESS NOTE ADULT - ASSESSMENT
78 year old M, HTN, HLD, Seizures, CAD with one stent on plavix, h/o UTI's presenting with LLQ abdominal pain since July 5th, no history of diverticulitis, she has a family history of colon cancer in her brother, last colonoscopy was over 30 years ago, denies fever or chills, she has been nauseous but denies vomiting.  No additional complaints.  She was transferred from [facility] for a higher level of care. Admitted for perforated diverticulitis with pneumoperitoneum    Perforated diverticulitis  Pneumoperitoneum  Leukocytosis  Diarrhea  Hypokalemia  Anemia    - f/u BCX ngtd  - c diff (-)  - gi pcr (-)  - pt underwent repeat cta/p 7/14 due to leukocytosis and was found to have developing perisigmoid abscess  - now s/p 7/15/23 exp lap, hartmanns procedure, found to have feculent peritonitis and pelvic abscess  - post op hypotension suspected due to acute blood loss anemia secondary to  bleeding vein at colostomy which was cauterized   - OR cx + polymicrobial with klebsiella, e.coli and bacteriodes, also c.albicans    - on 7/18 developed vomiting and post op ileus  - Ua (-)  - bcx repeated 7/18 ngtd  - Continue zosyn for now pending clinical course- will continue for now given leukocytosis.   - c/w caspofungin given leukocytosis  - procalcitonin may be falsely elevated after surgery  - Trend Fever  - Trend Leukocytosis, improving  - monitor drain output  - trend h/h  - advance diet as tolerated    d/w daughter    Will Follow

## 2023-07-21 NOTE — CONSULT NOTE ADULT - SUBJECTIVE AND OBJECTIVE BOX
HPI:  78 year old  post menopausal F, with PMHx of HTN, HLD, Seizures, CAD with one stent on plavix, h/o UTI's admitted for perforated diverticulitis. Gyn consulted for episodes of vaginal bleeding that started this AM. Per daughter's report, when the patient got up this morning a golf-ball sized clot came out of her vagina. Since then, every time she gets up she has some bright red spotting on the floor and in the toilet. The patient denies abdominal cramping or pain. The patient reports no prior history of post menopausal bleeding. States she went through menopause in the mid 90s. Has not followed up with a GYN since around that time.     PMHX; HTN, HLD, seizures, CAD with stent, UTIs  PSHX; multiple ortho surgeries (arm, wrist, L hip, knee), open fermin, lap appy. Hartmanns colostomy  POBHX; . all   PGYNHX: hx of fibroids, had 2 D&Cs for pain/bleeding. Has not had GYN care or pap testing in decades  Allergies    No Known Allergies    Intolerances      MEDS:  acetaminophen     Tablet .. 975 milliGRAM(s) Oral every 6 hours  atorvastatin 40 milliGRAM(s) Oral at bedtime  carBAMazepine 200 milliGRAM(s) Oral two times a day  caspofungin IVPB      caspofungin IVPB 50 milliGRAM(s) IV Intermittent every 24 hours  dextrose 5%. 1000 milliLiter(s) IV Continuous <Continuous>  dextrose 5%. 1000 milliLiter(s) IV Continuous <Continuous>  dextrose 50% Injectable 12.5 Gram(s) IV Push once  dextrose 50% Injectable 25 Gram(s) IV Push once  enoxaparin Injectable 40 milliGRAM(s) SubCutaneous every 24 hours  glucagon  Injectable 1 milliGRAM(s) IntraMuscular once  metoprolol tartrate 12.5 milliGRAM(s) Oral every 12 hours  naloxone Injectable 0.1 milliGRAM(s) IV Push every 3 minutes PRN  ondansetron Injectable 4 milliGRAM(s) IV Push every 6 hours PRN  oxyCODONE    IR 5 milliGRAM(s) Oral every 6 hours PRN  oxyCODONE    IR 10 milliGRAM(s) Oral every 6 hours PRN  pantoprazole  Injectable 40 milliGRAM(s) IV Push daily  PARoxetine 30 milliGRAM(s) Oral daily  piperacillin/tazobactam IVPB.. 3.375 Gram(s) IV Intermittent every 8 hours  potassium phosphate / sodium phosphate Powder (PHOS-NaK) 1 Packet(s) Oral once      Vital Signs Last 24 Hrs  T(C): 36.6 (2023 08:35), Max: 37.2 (2023 05:00)  T(F): 97.8 (2023 08:35), Max: 99 (2023 05:00)  HR: 68 (2023 08:35) (68 - 79)  BP: 134/77 (2023 08:35) (132/59 - 155/79)  RR: 17 (2023 08:35) (17 - 20)  SpO2: 91% (2023 08:35) (91% - 98%)       PHYSICAL EXAM:  CHEST/LUNG: Clear to percussion bilaterally; No rales, rhonchi, wheezing, or rubs  HEART: Regular rate and rhythm; No murmurs, rubs, or gallops  ABDOMEN: Soft, Nontender, Nondistended; Bowel sounds present. Colostomy bag present.   EXTREMITIES:  2+ Peripheral Pulses, No clubbing, cyanosis, or edema. Extensive bruising at Lovenox sites  PELVIC:        EXTERNAL GENITALIA: Normal. No rashes or lesions noted.         VAGINA: healthy pink mucosa, no gross lesions, no discharge noted, dark red blood seen in vagina         CERVIX: Visual exam limited by body habitus. On bimanual exam the cervix had irregular texture.         UTERUS: normal size, nontender, mobile        ADNEXA: no adnexal masses or tenderness appreciated.    LABS:                        7.8    20.38 )-----------( 586      ( 2023 09:05 )             24.9     07-21    140  |  101  |  6.3<L>  ----------------------------<  108<H>  3.2<L>   |  27.0  |  0.50    Ca    7.5<L>      2023 13:09  Phos  2.2     07-21  Mg     1.8     07-21      Urinalysis Basic - ( 2023 13:09 )    Color: x / Appearance: x / SG: x / pH: x  Gluc: 108 mg/dL / Ketone: x  / Bili: x / Urobili: x   Blood: x / Protein: x / Nitrite: x   Leuk Esterase: x / RBC: x / WBC x   Sq Epi: x / Non Sq Epi: x / Bacteria: x          RADIOLOGY STUDIES:  < from: CT Abdomen and Pelvis w/ Oral Cont and w/ IV Cont (23 @ 14:51) >  REPRODUCTIVE ORGANS: Abnormally thickened endometrium.    < end of copied text >

## 2023-07-21 NOTE — CONSULT NOTE ADULT - ASSESSMENT
A/P: 78 year old  post menopausal F here for perforated diverticulitis, seen for postmenopausal bleeding.   -VSS   -Exam was limited by pt tolerance of exam and body habitus.   -TVUS to further evaluate CT findings of thickened endometrium  -Discussed with patient the necessity of outpatient follow up. She will likely need outpatient endometrial biopsy which was discussed with the patient and her daughter.   -GYN will follow and discuss results of TVUS with pt tomorrow    Discussed with Dr Jeffers

## 2023-07-21 NOTE — PROGRESS NOTE ADULT - ASSESSMENT
A: 78F with perforated diverticulitis s/p Hartmanns. Had post-op ileus with NGT placement, NGT output decresing-plan to clamp trial today-possible NGT removal. Nausea has resolved. Wound care- dressing changed with wet kerlex and ABD over top. Patient has been started back on Lasix.   Intraop OR cultures growing polymicrobial with klebsiella, e.coli and bacteriodes, also c/albicans  Ambulating with the walker    Plan:   - Start low fiber diet  - D/C IVF  - change to wound vac   - Lasix  - monitor ostomy output  - f/u am labs   - continue zosyn and fluconazole and follow up ID recs

## 2023-07-21 NOTE — CONSULT NOTE ADULT - ATTENDING COMMENTS
PMB, would advise TVUS and outpatient GYN referral for EMB    Faith Li
I have seen and examined the patient in pacu and the in arrival to SICU    Neuro: Awake, pain under cont, MAR  CV:  HD abnormal requiring Norepinephrine for MAP >65, LA normal  Pulm: SaO2 adequate  GI: ABd obese, dressing c.d.i, some with some clotted blood  : urine flow adequate, hypokalemia  ID: Zosyn reactive post op leukocytosis  Heme: H/H lower, blood loss post op anemia on DVt chemoprophylaxes   Endo: glycemia at target    Plan:  Mirtha, Post op SORS hypotension, POCUS hypovolemia.  Admit to SICU for Volume repletion.  Wean Jackson-Synephrine target MAP > 65  IVF  Zosyn 4 days after source control   DVt chemoprophylaxes

## 2023-07-22 LAB
-  FLUCONAZOLE: SIGNIFICANT CHANGE UP
ANION GAP SERPL CALC-SCNC: 11 MMOL/L — SIGNIFICANT CHANGE UP (ref 5–17)
BASOPHILS # BLD AUTO: 0.06 K/UL — SIGNIFICANT CHANGE UP (ref 0–0.2)
BASOPHILS NFR BLD AUTO: 0.3 % — SIGNIFICANT CHANGE UP (ref 0–2)
BUN SERPL-MCNC: 5.1 MG/DL — LOW (ref 8–20)
CALCIUM SERPL-MCNC: 7.9 MG/DL — LOW (ref 8.4–10.5)
CHLORIDE SERPL-SCNC: 103 MMOL/L — SIGNIFICANT CHANGE UP (ref 96–108)
CO2 SERPL-SCNC: 26 MMOL/L — SIGNIFICANT CHANGE UP (ref 22–29)
CREAT SERPL-MCNC: 0.51 MG/DL — SIGNIFICANT CHANGE UP (ref 0.5–1.3)
CULTURE RESULTS: SIGNIFICANT CHANGE UP
EGFR: 97 ML/MIN/1.73M2 — SIGNIFICANT CHANGE UP
EOSINOPHIL # BLD AUTO: 0.28 K/UL — SIGNIFICANT CHANGE UP (ref 0–0.5)
EOSINOPHIL NFR BLD AUTO: 1.5 % — SIGNIFICANT CHANGE UP (ref 0–6)
GLUCOSE SERPL-MCNC: 102 MG/DL — HIGH (ref 70–99)
HCT VFR BLD CALC: 27.6 % — LOW (ref 34.5–45)
HGB BLD-MCNC: 8.6 G/DL — LOW (ref 11.5–15.5)
IMM GRANULOCYTES NFR BLD AUTO: 3.7 % — HIGH (ref 0–0.9)
LYMPHOCYTES # BLD AUTO: 0.93 K/UL — LOW (ref 1–3.3)
LYMPHOCYTES # BLD AUTO: 5 % — LOW (ref 13–44)
MAGNESIUM SERPL-MCNC: 1.8 MG/DL — SIGNIFICANT CHANGE UP (ref 1.8–2.6)
MCHC RBC-ENTMCNC: 30.5 PG — SIGNIFICANT CHANGE UP (ref 27–34)
MCHC RBC-ENTMCNC: 31.2 GM/DL — LOW (ref 32–36)
MCV RBC AUTO: 97.9 FL — SIGNIFICANT CHANGE UP (ref 80–100)
METHOD TYPE: SIGNIFICANT CHANGE UP
MONOCYTES # BLD AUTO: 1.18 K/UL — HIGH (ref 0–0.9)
MONOCYTES NFR BLD AUTO: 6.3 % — SIGNIFICANT CHANGE UP (ref 2–14)
NEUTROPHILS # BLD AUTO: 15.58 K/UL — HIGH (ref 1.8–7.4)
NEUTROPHILS NFR BLD AUTO: 83.2 % — HIGH (ref 43–77)
ORGANISM # SPEC MICROSCOPIC CNT: SIGNIFICANT CHANGE UP
PHOSPHATE SERPL-MCNC: 2.6 MG/DL — SIGNIFICANT CHANGE UP (ref 2.4–4.7)
PLATELET # BLD AUTO: 686 K/UL — HIGH (ref 150–400)
POTASSIUM SERPL-MCNC: 3.3 MMOL/L — LOW (ref 3.5–5.3)
POTASSIUM SERPL-SCNC: 3.3 MMOL/L — LOW (ref 3.5–5.3)
RBC # BLD: 2.82 M/UL — LOW (ref 3.8–5.2)
RBC # FLD: 14.7 % — HIGH (ref 10.3–14.5)
SODIUM SERPL-SCNC: 140 MMOL/L — SIGNIFICANT CHANGE UP (ref 135–145)
WBC # BLD: 18.72 K/UL — HIGH (ref 3.8–10.5)
WBC # FLD AUTO: 18.72 K/UL — HIGH (ref 3.8–10.5)

## 2023-07-22 RX ORDER — POTASSIUM CHLORIDE 20 MEQ
40 PACKET (EA) ORAL ONCE
Refills: 0 | Status: COMPLETED | OUTPATIENT
Start: 2023-07-22 | End: 2023-07-22

## 2023-07-22 RX ORDER — SODIUM,POTASSIUM PHOSPHATES 278-250MG
1 POWDER IN PACKET (EA) ORAL ONCE
Refills: 0 | Status: COMPLETED | OUTPATIENT
Start: 2023-07-22 | End: 2023-07-22

## 2023-07-22 RX ORDER — MAGNESIUM SULFATE 500 MG/ML
1 VIAL (ML) INJECTION ONCE
Refills: 0 | Status: COMPLETED | OUTPATIENT
Start: 2023-07-22 | End: 2023-07-22

## 2023-07-22 RX ADMIN — Medication 100 GRAM(S): at 12:19

## 2023-07-22 RX ADMIN — Medication 975 MILLIGRAM(S): at 05:47

## 2023-07-22 RX ADMIN — ENOXAPARIN SODIUM 40 MILLIGRAM(S): 100 INJECTION SUBCUTANEOUS at 05:58

## 2023-07-22 RX ADMIN — Medication 975 MILLIGRAM(S): at 18:08

## 2023-07-22 RX ADMIN — Medication 1 PACKET(S): at 12:18

## 2023-07-22 RX ADMIN — Medication 200 MILLIGRAM(S): at 18:08

## 2023-07-22 RX ADMIN — ATORVASTATIN CALCIUM 40 MILLIGRAM(S): 80 TABLET, FILM COATED ORAL at 21:20

## 2023-07-22 RX ADMIN — Medication 12.5 MILLIGRAM(S): at 18:09

## 2023-07-22 RX ADMIN — Medication 200 MILLIGRAM(S): at 05:41

## 2023-07-22 RX ADMIN — PIPERACILLIN AND TAZOBACTAM 25 GRAM(S): 4; .5 INJECTION, POWDER, LYOPHILIZED, FOR SOLUTION INTRAVENOUS at 05:40

## 2023-07-22 RX ADMIN — Medication 40 MILLIEQUIVALENT(S): at 12:18

## 2023-07-22 RX ADMIN — Medication 30 MILLIGRAM(S): at 12:22

## 2023-07-22 RX ADMIN — Medication 975 MILLIGRAM(S): at 05:41

## 2023-07-22 RX ADMIN — PANTOPRAZOLE SODIUM 40 MILLIGRAM(S): 20 TABLET, DELAYED RELEASE ORAL at 12:12

## 2023-07-22 RX ADMIN — CASPOFUNGIN ACETATE 260 MILLIGRAM(S): 7 INJECTION, POWDER, LYOPHILIZED, FOR SOLUTION INTRAVENOUS at 19:15

## 2023-07-22 RX ADMIN — Medication 975 MILLIGRAM(S): at 12:25

## 2023-07-22 RX ADMIN — Medication 975 MILLIGRAM(S): at 12:12

## 2023-07-22 RX ADMIN — Medication 12.5 MILLIGRAM(S): at 05:40

## 2023-07-22 RX ADMIN — Medication 975 MILLIGRAM(S): at 18:00

## 2023-07-23 LAB
-  AMOXICILLIN/CLAVULANIC ACID: SIGNIFICANT CHANGE UP
-  CEFTRIAXONE: SIGNIFICANT CHANGE UP
-  CLINDAMYCIN: SIGNIFICANT CHANGE UP
-  IMIPENEM: SIGNIFICANT CHANGE UP
-  LEVOFLOXACIN: SIGNIFICANT CHANGE UP
-  METRONIDAZOLE: SIGNIFICANT CHANGE UP
ANION GAP SERPL CALC-SCNC: 11 MMOL/L — SIGNIFICANT CHANGE UP (ref 5–17)
BASOPHILS # BLD AUTO: 0.09 K/UL — SIGNIFICANT CHANGE UP (ref 0–0.2)
BASOPHILS NFR BLD AUTO: 0.5 % — SIGNIFICANT CHANGE UP (ref 0–2)
BUN SERPL-MCNC: 5.1 MG/DL — LOW (ref 8–20)
CALCIUM SERPL-MCNC: 7.7 MG/DL — LOW (ref 8.4–10.5)
CHLORIDE SERPL-SCNC: 104 MMOL/L — SIGNIFICANT CHANGE UP (ref 96–108)
CO2 SERPL-SCNC: 26 MMOL/L — SIGNIFICANT CHANGE UP (ref 22–29)
CREAT SERPL-MCNC: 0.57 MG/DL — SIGNIFICANT CHANGE UP (ref 0.5–1.3)
CULTURE RESULTS: SIGNIFICANT CHANGE UP
EGFR: 94 ML/MIN/1.73M2 — SIGNIFICANT CHANGE UP
EOSINOPHIL # BLD AUTO: 0.34 K/UL — SIGNIFICANT CHANGE UP (ref 0–0.5)
EOSINOPHIL NFR BLD AUTO: 2 % — SIGNIFICANT CHANGE UP (ref 0–6)
GLUCOSE SERPL-MCNC: 91 MG/DL — SIGNIFICANT CHANGE UP (ref 70–99)
HCT VFR BLD CALC: 29.7 % — LOW (ref 34.5–45)
HGB BLD-MCNC: 9.1 G/DL — LOW (ref 11.5–15.5)
IMM GRANULOCYTES NFR BLD AUTO: 2.5 % — HIGH (ref 0–0.9)
LYMPHOCYTES # BLD AUTO: 1.27 K/UL — SIGNIFICANT CHANGE UP (ref 1–3.3)
LYMPHOCYTES # BLD AUTO: 7.3 % — LOW (ref 13–44)
MAGNESIUM SERPL-MCNC: 2.2 MG/DL — SIGNIFICANT CHANGE UP (ref 1.6–2.6)
MCHC RBC-ENTMCNC: 30.6 GM/DL — LOW (ref 32–36)
MCHC RBC-ENTMCNC: 30.8 PG — SIGNIFICANT CHANGE UP (ref 27–34)
MCV RBC AUTO: 100.7 FL — HIGH (ref 80–100)
METHOD TYPE: SIGNIFICANT CHANGE UP
MONOCYTES # BLD AUTO: 1.28 K/UL — HIGH (ref 0–0.9)
MONOCYTES NFR BLD AUTO: 7.4 % — SIGNIFICANT CHANGE UP (ref 2–14)
NEUTROPHILS # BLD AUTO: 13.91 K/UL — HIGH (ref 1.8–7.4)
NEUTROPHILS NFR BLD AUTO: 80.3 % — HIGH (ref 43–77)
ORGANISM # SPEC MICROSCOPIC CNT: SIGNIFICANT CHANGE UP
PHOSPHATE SERPL-MCNC: 2.6 MG/DL — SIGNIFICANT CHANGE UP (ref 2.4–4.7)
PLATELET # BLD AUTO: 828 K/UL — HIGH (ref 150–400)
POTASSIUM SERPL-MCNC: 3.1 MMOL/L — LOW (ref 3.5–5.3)
POTASSIUM SERPL-SCNC: 3.1 MMOL/L — LOW (ref 3.5–5.3)
RBC # BLD: 2.95 M/UL — LOW (ref 3.8–5.2)
RBC # FLD: 14.6 % — HIGH (ref 10.3–14.5)
SODIUM SERPL-SCNC: 141 MMOL/L — SIGNIFICANT CHANGE UP (ref 135–145)
SPECIMEN SOURCE: SIGNIFICANT CHANGE UP
WBC # BLD: 17.33 K/UL — HIGH (ref 3.8–10.5)
WBC # FLD AUTO: 17.33 K/UL — HIGH (ref 3.8–10.5)

## 2023-07-23 RX ORDER — PIPERACILLIN AND TAZOBACTAM 4; .5 G/20ML; G/20ML
3.38 INJECTION, POWDER, LYOPHILIZED, FOR SOLUTION INTRAVENOUS EVERY 8 HOURS
Refills: 0 | Status: DISCONTINUED | OUTPATIENT
Start: 2023-07-23 | End: 2023-07-26

## 2023-07-23 RX ORDER — SODIUM,POTASSIUM PHOSPHATES 278-250MG
1 POWDER IN PACKET (EA) ORAL ONCE
Refills: 0 | Status: COMPLETED | OUTPATIENT
Start: 2023-07-23 | End: 2023-07-23

## 2023-07-23 RX ORDER — POTASSIUM CHLORIDE 20 MEQ
40 PACKET (EA) ORAL ONCE
Refills: 0 | Status: COMPLETED | OUTPATIENT
Start: 2023-07-23 | End: 2023-07-23

## 2023-07-23 RX ADMIN — Medication 200 MILLIGRAM(S): at 05:09

## 2023-07-23 RX ADMIN — Medication 975 MILLIGRAM(S): at 17:48

## 2023-07-23 RX ADMIN — Medication 975 MILLIGRAM(S): at 05:07

## 2023-07-23 RX ADMIN — PIPERACILLIN AND TAZOBACTAM 25 GRAM(S): 4; .5 INJECTION, POWDER, LYOPHILIZED, FOR SOLUTION INTRAVENOUS at 23:35

## 2023-07-23 RX ADMIN — Medication 30 MILLIGRAM(S): at 11:13

## 2023-07-23 RX ADMIN — Medication 12.5 MILLIGRAM(S): at 05:07

## 2023-07-23 RX ADMIN — ENOXAPARIN SODIUM 40 MILLIGRAM(S): 100 INJECTION SUBCUTANEOUS at 05:09

## 2023-07-23 RX ADMIN — Medication 200 MILLIGRAM(S): at 17:51

## 2023-07-23 RX ADMIN — Medication 975 MILLIGRAM(S): at 05:37

## 2023-07-23 RX ADMIN — PANTOPRAZOLE SODIUM 40 MILLIGRAM(S): 20 TABLET, DELAYED RELEASE ORAL at 11:13

## 2023-07-23 RX ADMIN — Medication 975 MILLIGRAM(S): at 23:35

## 2023-07-23 RX ADMIN — Medication 12.5 MILLIGRAM(S): at 18:08

## 2023-07-23 RX ADMIN — Medication 975 MILLIGRAM(S): at 11:12

## 2023-07-23 RX ADMIN — ATORVASTATIN CALCIUM 40 MILLIGRAM(S): 80 TABLET, FILM COATED ORAL at 23:35

## 2023-07-23 RX ADMIN — PIPERACILLIN AND TAZOBACTAM 25 GRAM(S): 4; .5 INJECTION, POWDER, LYOPHILIZED, FOR SOLUTION INTRAVENOUS at 13:49

## 2023-07-23 RX ADMIN — Medication 40 MILLIEQUIVALENT(S): at 17:47

## 2023-07-23 RX ADMIN — CASPOFUNGIN ACETATE 260 MILLIGRAM(S): 7 INJECTION, POWDER, LYOPHILIZED, FOR SOLUTION INTRAVENOUS at 18:07

## 2023-07-23 RX ADMIN — Medication 1 PACKET(S): at 13:48

## 2023-07-23 NOTE — PROGRESS NOTE ADULT - ASSESSMENT
Ms. Rocha is a 78F s/p ex lap and arlene's procedure POD 8 for perforated diverticulitis.  She is progressing well and tolerating her diet.     Plan  - Will monitor and replete potassium  - Low fiber diet  - Continue zosyn and capsofungin per ID, appreciate their recs  - Will replace wound vac tomorrow 7/24  - Lovenox and SCDs for dvt ppx  - Gyn outpatient follow up with endometrial biopsy, appreciate their recs      Royal Rico MD  General Surgery Resident

## 2023-07-24 LAB
ANION GAP SERPL CALC-SCNC: 10 MMOL/L — SIGNIFICANT CHANGE UP (ref 5–17)
BASOPHILS # BLD AUTO: 0.12 K/UL — SIGNIFICANT CHANGE UP (ref 0–0.2)
BASOPHILS NFR BLD AUTO: 0.7 % — SIGNIFICANT CHANGE UP (ref 0–2)
BUN SERPL-MCNC: 6.6 MG/DL — LOW (ref 8–20)
CALCIUM SERPL-MCNC: 7.8 MG/DL — LOW (ref 8.4–10.5)
CHLORIDE SERPL-SCNC: 105 MMOL/L — SIGNIFICANT CHANGE UP (ref 96–108)
CO2 SERPL-SCNC: 26 MMOL/L — SIGNIFICANT CHANGE UP (ref 22–29)
CREAT SERPL-MCNC: 0.48 MG/DL — LOW (ref 0.5–1.3)
EGFR: 98 ML/MIN/1.73M2 — SIGNIFICANT CHANGE UP
EOSINOPHIL # BLD AUTO: 0.33 K/UL — SIGNIFICANT CHANGE UP (ref 0–0.5)
EOSINOPHIL NFR BLD AUTO: 2 % — SIGNIFICANT CHANGE UP (ref 0–6)
GLUCOSE SERPL-MCNC: 92 MG/DL — SIGNIFICANT CHANGE UP (ref 70–99)
HCT VFR BLD CALC: 28.5 % — LOW (ref 34.5–45)
HGB BLD-MCNC: 8.8 G/DL — LOW (ref 11.5–15.5)
IMM GRANULOCYTES NFR BLD AUTO: 2.1 % — HIGH (ref 0–0.9)
LYMPHOCYTES # BLD AUTO: 1.44 K/UL — SIGNIFICANT CHANGE UP (ref 1–3.3)
LYMPHOCYTES # BLD AUTO: 8.8 % — LOW (ref 13–44)
MAGNESIUM SERPL-MCNC: 2 MG/DL — SIGNIFICANT CHANGE UP (ref 1.6–2.6)
MCHC RBC-ENTMCNC: 30.9 GM/DL — LOW (ref 32–36)
MCHC RBC-ENTMCNC: 31 PG — SIGNIFICANT CHANGE UP (ref 27–34)
MCV RBC AUTO: 100.4 FL — HIGH (ref 80–100)
MONOCYTES # BLD AUTO: 1.28 K/UL — HIGH (ref 0–0.9)
MONOCYTES NFR BLD AUTO: 7.8 % — SIGNIFICANT CHANGE UP (ref 2–14)
NEUTROPHILS # BLD AUTO: 12.93 K/UL — HIGH (ref 1.8–7.4)
NEUTROPHILS NFR BLD AUTO: 78.6 % — HIGH (ref 43–77)
PHOSPHATE SERPL-MCNC: 2.3 MG/DL — LOW (ref 2.4–4.7)
PLATELET # BLD AUTO: 891 K/UL — HIGH (ref 150–400)
POTASSIUM SERPL-MCNC: 3.3 MMOL/L — LOW (ref 3.5–5.3)
POTASSIUM SERPL-SCNC: 3.3 MMOL/L — LOW (ref 3.5–5.3)
RBC # BLD: 2.84 M/UL — LOW (ref 3.8–5.2)
RBC # FLD: 14.6 % — HIGH (ref 10.3–14.5)
SODIUM SERPL-SCNC: 141 MMOL/L — SIGNIFICANT CHANGE UP (ref 135–145)
WBC # BLD: 16.45 K/UL — HIGH (ref 3.8–10.5)
WBC # FLD AUTO: 16.45 K/UL — HIGH (ref 3.8–10.5)

## 2023-07-24 PROCEDURE — 99233 SBSQ HOSP IP/OBS HIGH 50: CPT

## 2023-07-24 RX ORDER — HYDROMORPHONE HYDROCHLORIDE 2 MG/ML
0.2 INJECTION INTRAMUSCULAR; INTRAVENOUS; SUBCUTANEOUS ONCE
Refills: 0 | Status: DISCONTINUED | OUTPATIENT
Start: 2023-07-24 | End: 2023-07-24

## 2023-07-24 RX ORDER — POTASSIUM CHLORIDE 20 MEQ
40 PACKET (EA) ORAL EVERY 4 HOURS
Refills: 0 | Status: COMPLETED | OUTPATIENT
Start: 2023-07-24 | End: 2023-07-24

## 2023-07-24 RX ORDER — SODIUM,POTASSIUM PHOSPHATES 278-250MG
1 POWDER IN PACKET (EA) ORAL ONCE
Refills: 0 | Status: COMPLETED | OUTPATIENT
Start: 2023-07-24 | End: 2023-07-24

## 2023-07-24 RX ADMIN — Medication 975 MILLIGRAM(S): at 14:38

## 2023-07-24 RX ADMIN — Medication 40 MILLIEQUIVALENT(S): at 18:11

## 2023-07-24 RX ADMIN — Medication 1 PACKET(S): at 14:08

## 2023-07-24 RX ADMIN — ENOXAPARIN SODIUM 40 MILLIGRAM(S): 100 INJECTION SUBCUTANEOUS at 06:16

## 2023-07-24 RX ADMIN — Medication 200 MILLIGRAM(S): at 06:17

## 2023-07-24 RX ADMIN — CASPOFUNGIN ACETATE 260 MILLIGRAM(S): 7 INJECTION, POWDER, LYOPHILIZED, FOR SOLUTION INTRAVENOUS at 18:12

## 2023-07-24 RX ADMIN — Medication 975 MILLIGRAM(S): at 14:08

## 2023-07-24 RX ADMIN — Medication 12.5 MILLIGRAM(S): at 06:17

## 2023-07-24 RX ADMIN — Medication 975 MILLIGRAM(S): at 00:20

## 2023-07-24 RX ADMIN — Medication 975 MILLIGRAM(S): at 18:34

## 2023-07-24 RX ADMIN — PIPERACILLIN AND TAZOBACTAM 25 GRAM(S): 4; .5 INJECTION, POWDER, LYOPHILIZED, FOR SOLUTION INTRAVENOUS at 06:17

## 2023-07-24 RX ADMIN — Medication 40 MILLIEQUIVALENT(S): at 14:08

## 2023-07-24 RX ADMIN — Medication 30 MILLIGRAM(S): at 14:08

## 2023-07-24 RX ADMIN — PIPERACILLIN AND TAZOBACTAM 25 GRAM(S): 4; .5 INJECTION, POWDER, LYOPHILIZED, FOR SOLUTION INTRAVENOUS at 23:13

## 2023-07-24 RX ADMIN — HYDROMORPHONE HYDROCHLORIDE 0.2 MILLIGRAM(S): 2 INJECTION INTRAMUSCULAR; INTRAVENOUS; SUBCUTANEOUS at 18:02

## 2023-07-24 RX ADMIN — Medication 975 MILLIGRAM(S): at 18:10

## 2023-07-24 RX ADMIN — Medication 12.5 MILLIGRAM(S): at 18:11

## 2023-07-24 RX ADMIN — Medication 975 MILLIGRAM(S): at 06:17

## 2023-07-24 RX ADMIN — Medication 975 MILLIGRAM(S): at 23:13

## 2023-07-24 RX ADMIN — Medication 975 MILLIGRAM(S): at 06:37

## 2023-07-24 RX ADMIN — ATORVASTATIN CALCIUM 40 MILLIGRAM(S): 80 TABLET, FILM COATED ORAL at 23:13

## 2023-07-24 RX ADMIN — Medication 200 MILLIGRAM(S): at 18:13

## 2023-07-24 RX ADMIN — PANTOPRAZOLE SODIUM 40 MILLIGRAM(S): 20 TABLET, DELAYED RELEASE ORAL at 14:08

## 2023-07-24 RX ADMIN — HYDROMORPHONE HYDROCHLORIDE 0.2 MILLIGRAM(S): 2 INJECTION INTRAMUSCULAR; INTRAVENOUS; SUBCUTANEOUS at 17:46

## 2023-07-24 RX ADMIN — PIPERACILLIN AND TAZOBACTAM 25 GRAM(S): 4; .5 INJECTION, POWDER, LYOPHILIZED, FOR SOLUTION INTRAVENOUS at 14:08

## 2023-07-24 NOTE — CHART NOTE - NSCHARTNOTEFT_GEN_A_CORE
Source: Patient [ ]  Family [ ]   other [x ] Pt in rest room x 3 attempts    78F s/p ex lap and arlene's procedure POD 9 for perforated diverticulitis.  She is progressing well and tolerating her diet. She was afebrile and hemodynamically stable this morning. Leukocytosis trending down. Will follow up on ID recs for outpatient antibiotics/antifungal. Wound vac will be changed today. f/u morning labs. Will speak to  for RADHA dispo.       Current Diet: Diet, Low Fiber (07-21-23 @ 08:22)      Patient reports [ ] nausea  [ ] vomiting [ ] diarrhea [ ] constipation  [ ]chewing problems [ ] swallowing issues  [ ] other:     PO intake:  < 50% [ ]   50-75%  [ ]   %  [ ]  other :    Source for PO intake [ ] Patient [ ] family [ ] chart [ ] staff [ ] other      Current Weight:   7/17 132.3 kg  7/16 127.1 kg  7/12 129.9 kg  7/9 127 kg    % Weight Change - trending up- may be realted to fluid  Edema : 1+ generalized , 2+ feet noted    Pertinent Medications: MEDICATIONS  (STANDING):  acetaminophen     Tablet .. 975 milliGRAM(s) Oral every 6 hours  atorvastatin 40 milliGRAM(s) Oral at bedtime  carBAMazepine 200 milliGRAM(s) Oral two times a day  caspofungin IVPB      caspofungin IVPB 50 milliGRAM(s) IV Intermittent every 24 hours  dextrose 5%. 1000 milliLiter(s) (50 mL/Hr) IV Continuous <Continuous>  dextrose 5%. 1000 milliLiter(s) (100 mL/Hr) IV Continuous <Continuous>  dextrose 50% Injectable 25 Gram(s) IV Push once  dextrose 50% Injectable 12.5 Gram(s) IV Push once  enoxaparin Injectable 40 milliGRAM(s) SubCutaneous every 24 hours  glucagon  Injectable 1 milliGRAM(s) IntraMuscular once  metoprolol tartrate 12.5 milliGRAM(s) Oral every 12 hours  pantoprazole  Injectable 40 milliGRAM(s) IV Push daily  PARoxetine 30 milliGRAM(s) Oral daily  piperacillin/tazobactam IVPB.. 3.375 Gram(s) IV Intermittent every 8 hours  potassium chloride    Tablet ER 40 milliEquivalent(s) Oral every 4 hours  potassium phosphate / sodium phosphate Powder (PHOS-NaK) 1 Packet(s) Oral once    MEDICATIONS  (PRN):  naloxone Injectable 0.1 milliGRAM(s) IV Push every 3 minutes PRN For ANY of the following changes in patient status:  A. RR LESS THAN 10 breaths per minute, B. Oxygen saturation LESS THAN 90%, C. Sedation score of 6  ondansetron Injectable 4 milliGRAM(s) IV Push every 6 hours PRN Nausea    Pertinent Labs: CBC Full  -  ( 24 Jul 2023 07:34 )  WBC Count : 16.45 K/uL  RBC Count : 2.84 M/uL  Hemoglobin : 8.8 g/dL  Hematocrit : 28.5 %  Platelet Count - Automated : 891 K/uL  Mean Cell Volume : 100.4 fl  Mean Cell Hemoglobin : 31.0 pg  Mean Cell Hemoglobin Concentration : 30.9 gm/dL  Auto Neutrophil # : 12.93 K/uL  Auto Lymphocyte # : 1.44 K/uL  Auto Monocyte # : 1.28 K/uL  Auto Eosinophil # : 0.33 K/uL  Auto Basophil # : 0.12 K/uL  Auto Neutrophil % : 78.6 %  Auto Lymphocyte % : 8.8 %  Auto Monocyte % : 7.8 %  Auto Eosinophil % : 2.0 %  Auto Basophil % : 0.7 %  07-24 Na141 mmol/L Glu 92 mg/dL K+ 3.3 mmol/L<L> Cr  0.48 mg/dL<L> BUN 6.6 mg/dL<L> Phos 2.3 mg/dL<L> Alb n/a   PAB n/a               Skin: sx incision midline abd, R knee abrasion, R shin blister    Nutrition focused physical exam conducted - found signs of malnutrition [ ]absent [ ]present    Subcutaneous fat loss: [ ] Orbital fat pads region, [ ]Buccal fat region, [ ]Triceps region,  [ ]Ribs region    Muscle wasting: [ ]Temples region, [ ]Clavicle region, [ ]Shoulder region, [ ]Scapula region, [ ]Interosseous region,  [ ]thigh region, [ ]Calf region    Estimated Needs:   [x ] no change since previous assessment  [ ] recalculated:     Current Nutrition Diagnosis: Pt presents at risk secondary to increased protein calorie needs, related to increased  physiologic demand stress factor, as evidenced by s/p ex lap and arlene's procedure POD 9 for perforated diverticulitis. Pt now on low fiber diet tolerating well per staff.     Recommendations:   1) Continue low fiber diet  2) RX MVI, vit C  3) Daily weight    Monitoring and Evaluation:   [ x] PO intake [x ] Tolerance to diet prescription [X] Weights  [X] Follow up per protocol [X] Labs:

## 2023-07-24 NOTE — PROGRESS NOTE ADULT - ASSESSMENT
78F s/p ex lap and arlene's procedure POD 8 for perforated diverticulitis.  She is progressing well and tolerating her diet. She was afebrile and hemodynamically stable this morning. Leukocytosis trending down. Will follow up on ID recs for outpatient antibiotics/antifungal. Wound vac will be changed today. f/u morning labs. Will speak to CM for RADHA dispo.     PLAN  -pain control  -strict Is/Os  -continue home meds  -continue zosyn and capsofungin   -trend labs, replete electrolytes as needed  -encourage OOB  -incentive spirometry  -DVT ppx: SCDs, Lovenox 40 daily      78F s/p ex lap and arlene's procedure POD 9 for perforated diverticulitis.  She is progressing well and tolerating her diet. She was afebrile and hemodynamically stable this morning. Leukocytosis trending down. Will follow up on ID recs for outpatient antibiotics/antifungal. Wound vac will be changed today. f/u morning labs. Will speak to CM for RADHA dispo.     PLAN  -pain control  -strict Is/Os  -continue home meds  -continue zosyn and capsofungin   -trend labs, replete electrolytes as needed  -encourage OOB  -incentive spirometry  -DVT ppx: SCDs, Lovenox 40 daily

## 2023-07-24 NOTE — PROGRESS NOTE ADULT - ASSESSMENT
78 year old M, HTN, HLD, Seizures, CAD with one stent on plavix, h/o UTI's presenting with LLQ abdominal pain since July 5th, no history of diverticulitis, she has a family history of colon cancer in her brother, last colonoscopy was over 30 years ago, denies fever or chills, she has been nauseous but denies vomiting.  No additional complaints.  She was transferred from [facility] for a higher level of care. Admitted for perforated diverticulitis with pneumoperitoneum    Perforated diverticulitis  Pneumoperitoneum  Leukocytosis  Diarrhea  Hypokalemia  Anemia    - f/u BCX ngtd  - c diff (-)  - gi pcr (-)  - pt underwent repeat cta/p 7/14 due to leukocytosis and was found to have developing perisigmoid abscess  - now s/p 7/15/23 exp lap, hartmanns procedure, found to have feculent peritonitis and pelvic abscess  - post op hypotension suspected due to acute blood loss anemia secondary to  bleeding vein at colostomy which was cauterized   - OR cx + polymicrobial with klebsiella, e.coli and bacteriodes x2, also c.albicans-sensitivities reviewed    - on 7/18 developed vomiting and post op ileus  - Ua (-)  - bcx repeated 7/18 ngtd  - Continue zosyn for now pending clinical course- will continue for now given leukocytosis.   - c/w caspofungin given leukocytosis  - Trend Fever  - Trend Leukocytosis, improving  - monitor drain output  - trend h/h  - USG TV with rt adnexal collection ? hematoma vs abscess- f/u GYN-? need for drainage  - DC planning for [facility]. c/w abx  IV while inpatient. when ready for DC suggest oral vantin 200mg bid, metronidazole 500mg po q8h and fluconazole po 200mg daily through 7/29/23  - monitor for carbamazepine toxicity -DDI between fluconazole and tegretol    ID f/u 2-3 weeks  d/w surgery resident, pharmacy    Will Follow

## 2023-07-25 LAB
ANION GAP SERPL CALC-SCNC: 12 MMOL/L — SIGNIFICANT CHANGE UP (ref 5–17)
ANION GAP SERPL CALC-SCNC: 9 MMOL/L — SIGNIFICANT CHANGE UP (ref 5–17)
BASOPHILS # BLD AUTO: 0.1 K/UL — SIGNIFICANT CHANGE UP (ref 0–0.2)
BASOPHILS NFR BLD AUTO: 0.7 % — SIGNIFICANT CHANGE UP (ref 0–2)
BUN SERPL-MCNC: 6.4 MG/DL — LOW (ref 8–20)
BUN SERPL-MCNC: 6.8 MG/DL — LOW (ref 8–20)
CALCIUM SERPL-MCNC: 7.8 MG/DL — LOW (ref 8.4–10.5)
CALCIUM SERPL-MCNC: 7.9 MG/DL — LOW (ref 8.4–10.5)
CHLORIDE SERPL-SCNC: 107 MMOL/L — SIGNIFICANT CHANGE UP (ref 96–108)
CHLORIDE SERPL-SCNC: 108 MMOL/L — SIGNIFICANT CHANGE UP (ref 96–108)
CO2 SERPL-SCNC: 24 MMOL/L — SIGNIFICANT CHANGE UP (ref 22–29)
CO2 SERPL-SCNC: 27 MMOL/L — SIGNIFICANT CHANGE UP (ref 22–29)
CREAT SERPL-MCNC: 0.46 MG/DL — LOW (ref 0.5–1.3)
CREAT SERPL-MCNC: 0.53 MG/DL — SIGNIFICANT CHANGE UP (ref 0.5–1.3)
EGFR: 96 ML/MIN/1.73M2 — SIGNIFICANT CHANGE UP
EGFR: 99 ML/MIN/1.73M2 — SIGNIFICANT CHANGE UP
EOSINOPHIL # BLD AUTO: 0.31 K/UL — SIGNIFICANT CHANGE UP (ref 0–0.5)
EOSINOPHIL NFR BLD AUTO: 2.2 % — SIGNIFICANT CHANGE UP (ref 0–6)
GLUCOSE SERPL-MCNC: 102 MG/DL — HIGH (ref 70–99)
GLUCOSE SERPL-MCNC: 116 MG/DL — HIGH (ref 70–99)
HCT VFR BLD CALC: 26 % — LOW (ref 34.5–45)
HGB BLD-MCNC: 8.1 G/DL — LOW (ref 11.5–15.5)
IMM GRANULOCYTES NFR BLD AUTO: 2.6 % — HIGH (ref 0–0.9)
LYMPHOCYTES # BLD AUTO: 1.57 K/UL — SIGNIFICANT CHANGE UP (ref 1–3.3)
LYMPHOCYTES # BLD AUTO: 11 % — LOW (ref 13–44)
MAGNESIUM SERPL-MCNC: 1.9 MG/DL — SIGNIFICANT CHANGE UP (ref 1.6–2.6)
MCHC RBC-ENTMCNC: 31.2 GM/DL — LOW (ref 32–36)
MCHC RBC-ENTMCNC: 31.3 PG — SIGNIFICANT CHANGE UP (ref 27–34)
MCV RBC AUTO: 100.4 FL — HIGH (ref 80–100)
MONOCYTES # BLD AUTO: 1.09 K/UL — HIGH (ref 0–0.9)
MONOCYTES NFR BLD AUTO: 7.6 % — SIGNIFICANT CHANGE UP (ref 2–14)
NEUTROPHILS # BLD AUTO: 10.83 K/UL — HIGH (ref 1.8–7.4)
NEUTROPHILS NFR BLD AUTO: 75.9 % — SIGNIFICANT CHANGE UP (ref 43–77)
PHOSPHATE SERPL-MCNC: 2.1 MG/DL — LOW (ref 2.4–4.7)
PLATELET # BLD AUTO: 842 K/UL — HIGH (ref 150–400)
POTASSIUM SERPL-MCNC: 3.5 MMOL/L — SIGNIFICANT CHANGE UP (ref 3.5–5.3)
POTASSIUM SERPL-MCNC: 3.7 MMOL/L — SIGNIFICANT CHANGE UP (ref 3.5–5.3)
POTASSIUM SERPL-SCNC: 3.5 MMOL/L — SIGNIFICANT CHANGE UP (ref 3.5–5.3)
POTASSIUM SERPL-SCNC: 3.7 MMOL/L — SIGNIFICANT CHANGE UP (ref 3.5–5.3)
RBC # BLD: 2.59 M/UL — LOW (ref 3.8–5.2)
RBC # FLD: 15 % — HIGH (ref 10.3–14.5)
SODIUM SERPL-SCNC: 143 MMOL/L — SIGNIFICANT CHANGE UP (ref 135–145)
SODIUM SERPL-SCNC: 144 MMOL/L — SIGNIFICANT CHANGE UP (ref 135–145)
WBC # BLD: 14.27 K/UL — HIGH (ref 3.8–10.5)
WBC # FLD AUTO: 14.27 K/UL — HIGH (ref 3.8–10.5)

## 2023-07-25 PROCEDURE — 99232 SBSQ HOSP IP/OBS MODERATE 35: CPT

## 2023-07-25 RX ORDER — SODIUM,POTASSIUM PHOSPHATES 278-250MG
1 POWDER IN PACKET (EA) ORAL
Refills: 0 | Status: COMPLETED | OUTPATIENT
Start: 2023-07-25 | End: 2023-07-26

## 2023-07-25 RX ORDER — POTASSIUM PHOSPHATE, MONOBASIC POTASSIUM PHOSPHATE, DIBASIC 236; 224 MG/ML; MG/ML
30 INJECTION, SOLUTION INTRAVENOUS ONCE
Refills: 0 | Status: COMPLETED | OUTPATIENT
Start: 2023-07-25 | End: 2023-07-25

## 2023-07-25 RX ADMIN — CASPOFUNGIN ACETATE 260 MILLIGRAM(S): 7 INJECTION, POWDER, LYOPHILIZED, FOR SOLUTION INTRAVENOUS at 18:48

## 2023-07-25 RX ADMIN — Medication 975 MILLIGRAM(S): at 05:18

## 2023-07-25 RX ADMIN — Medication 30 MILLIGRAM(S): at 13:13

## 2023-07-25 RX ADMIN — Medication 200 MILLIGRAM(S): at 18:47

## 2023-07-25 RX ADMIN — POTASSIUM PHOSPHATE, MONOBASIC POTASSIUM PHOSPHATE, DIBASIC 83.33 MILLIMOLE(S): 236; 224 INJECTION, SOLUTION INTRAVENOUS at 18:48

## 2023-07-25 RX ADMIN — Medication 975 MILLIGRAM(S): at 00:00

## 2023-07-25 RX ADMIN — Medication 200 MILLIGRAM(S): at 05:19

## 2023-07-25 RX ADMIN — ATORVASTATIN CALCIUM 40 MILLIGRAM(S): 80 TABLET, FILM COATED ORAL at 21:22

## 2023-07-25 RX ADMIN — PIPERACILLIN AND TAZOBACTAM 25 GRAM(S): 4; .5 INJECTION, POWDER, LYOPHILIZED, FOR SOLUTION INTRAVENOUS at 22:32

## 2023-07-25 RX ADMIN — ENOXAPARIN SODIUM 40 MILLIGRAM(S): 100 INJECTION SUBCUTANEOUS at 05:18

## 2023-07-25 RX ADMIN — Medication 975 MILLIGRAM(S): at 19:47

## 2023-07-25 RX ADMIN — Medication 12.5 MILLIGRAM(S): at 18:47

## 2023-07-25 RX ADMIN — PANTOPRAZOLE SODIUM 40 MILLIGRAM(S): 20 TABLET, DELAYED RELEASE ORAL at 13:14

## 2023-07-25 RX ADMIN — Medication 975 MILLIGRAM(S): at 14:14

## 2023-07-25 RX ADMIN — Medication 975 MILLIGRAM(S): at 23:36

## 2023-07-25 RX ADMIN — Medication 12.5 MILLIGRAM(S): at 05:18

## 2023-07-25 RX ADMIN — Medication 975 MILLIGRAM(S): at 13:13

## 2023-07-25 RX ADMIN — Medication 975 MILLIGRAM(S): at 18:47

## 2023-07-25 RX ADMIN — PIPERACILLIN AND TAZOBACTAM 25 GRAM(S): 4; .5 INJECTION, POWDER, LYOPHILIZED, FOR SOLUTION INTRAVENOUS at 05:18

## 2023-07-25 RX ADMIN — Medication 1 PACKET(S): at 18:47

## 2023-07-25 RX ADMIN — PIPERACILLIN AND TAZOBACTAM 25 GRAM(S): 4; .5 INJECTION, POWDER, LYOPHILIZED, FOR SOLUTION INTRAVENOUS at 13:14

## 2023-07-25 RX ADMIN — Medication 975 MILLIGRAM(S): at 06:00

## 2023-07-25 NOTE — PROGRESS NOTE ADULT - ASSESSMENT
78 year old M, HTN, HLD, Seizures, CAD with one stent on plavix, h/o UTI's presenting with LLQ abdominal pain since July 5th, no history of diverticulitis, she has a family history of colon cancer in her brother, last colonoscopy was over 30 years ago, denies fever or chills, she has been nauseous but denies vomiting.  No additional complaints.  She was transferred from [facility] for a higher level of care. Admitted for perforated diverticulitis with pneumoperitoneum    Perforated diverticulitis  Pneumoperitoneum  Leukocytosis  Diarrhea  Hypokalemia  Anemia    - f/u BCX ngtd  - c diff (-)  - gi pcr (-)  - pt underwent repeat cta/p 7/14 due to leukocytosis and was found to have developing perisigmoid abscess  - now s/p 7/15/23 exp lap, hartmanns procedure, found to have feculent peritonitis and pelvic abscess  - post op hypotension suspected due to acute blood loss anemia secondary to  bleeding vein at colostomy which was cauterized   - OR cx + polymicrobial with klebsiella, e.coli and bacteriodes x2, also c.albicans-sensitivities reviewed    - on 7/18 developed vomiting and post op ileus  - Ua (-)  - bcx repeated 7/18 ngtd  - Continue zosyn for now pending clinical course- will continue for now given leukocytosis.   - c/w caspofungin given leukocytosis  - Trend Fever  - Trend Leukocytosis, improving  - monitor drain output  - trend h/h  - USG TV with rt adnexal collection ? hematoma vs abscess-discussed with CRS- no plan for drainage, pt will likely go to [facility] with LEV  - DC planning for [facility]. c/w abx  IV while inpatient. when ready for DC suggest oral vantin 200mg bid, metronidazole 500mg po q8h and fluconazole po 200mg daily through 7/29/23  - monitor for carbamazepine toxicity -DDI between fluconazole and tegretol    ID f/u 2-3 weeks  d/w CRS    please call with questions

## 2023-07-26 ENCOUNTER — TRANSCRIPTION ENCOUNTER (OUTPATIENT)
Age: 78
End: 2023-07-26

## 2023-07-26 VITALS
DIASTOLIC BLOOD PRESSURE: 87 MMHG | TEMPERATURE: 98 F | SYSTOLIC BLOOD PRESSURE: 132 MMHG | RESPIRATION RATE: 18 BRPM | OXYGEN SATURATION: 94 % | HEART RATE: 85 BPM

## 2023-07-26 LAB
ANION GAP SERPL CALC-SCNC: 9 MMOL/L — SIGNIFICANT CHANGE UP (ref 5–17)
BASOPHILS # BLD AUTO: 0.13 K/UL — SIGNIFICANT CHANGE UP (ref 0–0.2)
BASOPHILS NFR BLD AUTO: 0.9 % — SIGNIFICANT CHANGE UP (ref 0–2)
BUN SERPL-MCNC: 5.3 MG/DL — LOW (ref 8–20)
CALCIUM SERPL-MCNC: 8.1 MG/DL — LOW (ref 8.4–10.5)
CHLORIDE SERPL-SCNC: 108 MMOL/L — SIGNIFICANT CHANGE UP (ref 96–108)
CO2 SERPL-SCNC: 26 MMOL/L — SIGNIFICANT CHANGE UP (ref 22–29)
CREAT SERPL-MCNC: 0.51 MG/DL — SIGNIFICANT CHANGE UP (ref 0.5–1.3)
EGFR: 97 ML/MIN/1.73M2 — SIGNIFICANT CHANGE UP
EOSINOPHIL # BLD AUTO: 0.29 K/UL — SIGNIFICANT CHANGE UP (ref 0–0.5)
EOSINOPHIL NFR BLD AUTO: 2 % — SIGNIFICANT CHANGE UP (ref 0–6)
GLUCOSE SERPL-MCNC: 106 MG/DL — HIGH (ref 70–99)
HCT VFR BLD CALC: 29.2 % — LOW (ref 34.5–45)
HGB BLD-MCNC: 8.7 G/DL — LOW (ref 11.5–15.5)
IMM GRANULOCYTES NFR BLD AUTO: 3 % — HIGH (ref 0–0.9)
LYMPHOCYTES # BLD AUTO: 1.63 K/UL — SIGNIFICANT CHANGE UP (ref 1–3.3)
LYMPHOCYTES # BLD AUTO: 11 % — LOW (ref 13–44)
MAGNESIUM SERPL-MCNC: 1.8 MG/DL — SIGNIFICANT CHANGE UP (ref 1.6–2.6)
MCHC RBC-ENTMCNC: 29.8 GM/DL — LOW (ref 32–36)
MCHC RBC-ENTMCNC: 30.3 PG — SIGNIFICANT CHANGE UP (ref 27–34)
MCV RBC AUTO: 101.7 FL — HIGH (ref 80–100)
MONOCYTES # BLD AUTO: 1.13 K/UL — HIGH (ref 0–0.9)
MONOCYTES NFR BLD AUTO: 7.7 % — SIGNIFICANT CHANGE UP (ref 2–14)
NEUTROPHILS # BLD AUTO: 11.15 K/UL — HIGH (ref 1.8–7.4)
NEUTROPHILS NFR BLD AUTO: 75.4 % — SIGNIFICANT CHANGE UP (ref 43–77)
PHOSPHATE SERPL-MCNC: 2.6 MG/DL — SIGNIFICANT CHANGE UP (ref 2.4–4.7)
PLATELET # BLD AUTO: 933 K/UL — HIGH (ref 150–400)
POTASSIUM SERPL-MCNC: 3.7 MMOL/L — SIGNIFICANT CHANGE UP (ref 3.5–5.3)
POTASSIUM SERPL-SCNC: 3.7 MMOL/L — SIGNIFICANT CHANGE UP (ref 3.5–5.3)
RBC # BLD: 2.87 M/UL — LOW (ref 3.8–5.2)
RBC # FLD: 15.3 % — HIGH (ref 10.3–14.5)
SODIUM SERPL-SCNC: 143 MMOL/L — SIGNIFICANT CHANGE UP (ref 135–145)
WBC # BLD: 14.77 K/UL — HIGH (ref 3.8–10.5)
WBC # FLD AUTO: 14.77 K/UL — HIGH (ref 3.8–10.5)

## 2023-07-26 PROCEDURE — 36430 TRANSFUSION BLD/BLD COMPNT: CPT

## 2023-07-26 PROCEDURE — 87493 C DIFF AMPLIFIED PROBE: CPT

## 2023-07-26 PROCEDURE — 36415 COLL VENOUS BLD VENIPUNCTURE: CPT

## 2023-07-26 PROCEDURE — 82803 BLOOD GASES ANY COMBINATION: CPT

## 2023-07-26 PROCEDURE — 80053 COMPREHEN METABOLIC PANEL: CPT

## 2023-07-26 PROCEDURE — 85730 THROMBOPLASTIN TIME PARTIAL: CPT

## 2023-07-26 PROCEDURE — 85018 HEMOGLOBIN: CPT

## 2023-07-26 PROCEDURE — 84295 ASSAY OF SERUM SODIUM: CPT

## 2023-07-26 PROCEDURE — 82040 ASSAY OF SERUM ALBUMIN: CPT

## 2023-07-26 PROCEDURE — 71045 X-RAY EXAM CHEST 1 VIEW: CPT

## 2023-07-26 PROCEDURE — 93005 ELECTROCARDIOGRAM TRACING: CPT

## 2023-07-26 PROCEDURE — 82947 ASSAY GLUCOSE BLOOD QUANT: CPT

## 2023-07-26 PROCEDURE — 86850 RBC ANTIBODY SCREEN: CPT

## 2023-07-26 PROCEDURE — 81001 URINALYSIS AUTO W/SCOPE: CPT

## 2023-07-26 PROCEDURE — 87086 URINE CULTURE/COLONY COUNT: CPT

## 2023-07-26 PROCEDURE — 84132 ASSAY OF SERUM POTASSIUM: CPT

## 2023-07-26 PROCEDURE — 86902 BLOOD TYPE ANTIGEN DONOR EA: CPT

## 2023-07-26 PROCEDURE — P9016: CPT

## 2023-07-26 PROCEDURE — 87507 IADNA-DNA/RNA PROBE TQ 12-25: CPT

## 2023-07-26 PROCEDURE — 87040 BLOOD CULTURE FOR BACTERIA: CPT

## 2023-07-26 PROCEDURE — 83605 ASSAY OF LACTIC ACID: CPT

## 2023-07-26 PROCEDURE — C8929: CPT

## 2023-07-26 PROCEDURE — 84145 PROCALCITONIN (PCT): CPT

## 2023-07-26 PROCEDURE — 82435 ASSAY OF BLOOD CHLORIDE: CPT

## 2023-07-26 PROCEDURE — 85610 PROTHROMBIN TIME: CPT

## 2023-07-26 PROCEDURE — 87186 SC STD MICRODIL/AGAR DIL: CPT

## 2023-07-26 PROCEDURE — 87075 CULTR BACTERIA EXCEPT BLOOD: CPT

## 2023-07-26 PROCEDURE — 88307 TISSUE EXAM BY PATHOLOGIST: CPT

## 2023-07-26 PROCEDURE — 76830 TRANSVAGINAL US NON-OB: CPT

## 2023-07-26 PROCEDURE — C1889: CPT

## 2023-07-26 PROCEDURE — 84100 ASSAY OF PHOSPHORUS: CPT

## 2023-07-26 PROCEDURE — P9059: CPT

## 2023-07-26 PROCEDURE — 85027 COMPLETE CBC AUTOMATED: CPT

## 2023-07-26 PROCEDURE — 83735 ASSAY OF MAGNESIUM: CPT

## 2023-07-26 PROCEDURE — 86901 BLOOD TYPING SEROLOGIC RH(D): CPT

## 2023-07-26 PROCEDURE — 83036 HEMOGLOBIN GLYCOSYLATED A1C: CPT

## 2023-07-26 PROCEDURE — 87640 STAPH A DNA AMP PROBE: CPT

## 2023-07-26 PROCEDURE — 87641 MR-STAPH DNA AMP PROBE: CPT

## 2023-07-26 PROCEDURE — 87077 CULTURE AEROBIC IDENTIFY: CPT

## 2023-07-26 PROCEDURE — 97163 PT EVAL HIGH COMPLEX 45 MIN: CPT

## 2023-07-26 PROCEDURE — 97110 THERAPEUTIC EXERCISES: CPT

## 2023-07-26 PROCEDURE — 71275 CT ANGIOGRAPHY CHEST: CPT

## 2023-07-26 PROCEDURE — 97116 GAIT TRAINING THERAPY: CPT

## 2023-07-26 PROCEDURE — 82962 GLUCOSE BLOOD TEST: CPT

## 2023-07-26 PROCEDURE — 85025 COMPLETE CBC W/AUTO DIFF WBC: CPT

## 2023-07-26 PROCEDURE — 86900 BLOOD TYPING SEROLOGIC ABO: CPT

## 2023-07-26 PROCEDURE — 86880 COOMBS TEST DIRECT: CPT

## 2023-07-26 PROCEDURE — 86985 SPLIT BLOOD OR PRODUCTS: CPT

## 2023-07-26 PROCEDURE — 86922 COMPATIBILITY TEST ANTIGLOB: CPT

## 2023-07-26 PROCEDURE — 74177 CT ABD & PELVIS W/CONTRAST: CPT

## 2023-07-26 PROCEDURE — 86803 HEPATITIS C AB TEST: CPT

## 2023-07-26 PROCEDURE — 76856 US EXAM PELVIC COMPLETE: CPT

## 2023-07-26 PROCEDURE — 85014 HEMATOCRIT: CPT

## 2023-07-26 PROCEDURE — C9399: CPT

## 2023-07-26 PROCEDURE — 74018 RADEX ABDOMEN 1 VIEW: CPT

## 2023-07-26 PROCEDURE — 80048 BASIC METABOLIC PNL TOTAL CA: CPT

## 2023-07-26 PROCEDURE — 93970 EXTREMITY STUDY: CPT

## 2023-07-26 PROCEDURE — P9011: CPT

## 2023-07-26 PROCEDURE — 86870 RBC ANTIBODY IDENTIFICATION: CPT

## 2023-07-26 PROCEDURE — 87070 CULTURE OTHR SPECIMN AEROBIC: CPT

## 2023-07-26 PROCEDURE — 82330 ASSAY OF CALCIUM: CPT

## 2023-07-26 PROCEDURE — 85384 FIBRINOGEN ACTIVITY: CPT

## 2023-07-26 RX ORDER — SODIUM CHLORIDE 9 MG/ML
500 INJECTION, SOLUTION INTRAVENOUS ONCE
Refills: 0 | Status: COMPLETED | OUTPATIENT
Start: 2023-07-26 | End: 2023-07-26

## 2023-07-26 RX ORDER — METRONIDAZOLE 500 MG
1 TABLET ORAL
Qty: 0 | Refills: 0 | DISCHARGE
Start: 2023-07-26 | End: 2023-07-29

## 2023-07-26 RX ORDER — FLUCONAZOLE 150 MG/1
1 TABLET ORAL
Qty: 0 | Refills: 0 | DISCHARGE
Start: 2023-07-26 | End: 2023-07-29

## 2023-07-26 RX ORDER — SODIUM CHLORIDE 9 MG/ML
1000 INJECTION, SOLUTION INTRAVENOUS ONCE
Refills: 0 | Status: DISCONTINUED | OUTPATIENT
Start: 2023-07-26 | End: 2023-07-26

## 2023-07-26 RX ORDER — MAGNESIUM SULFATE 500 MG/ML
2 VIAL (ML) INJECTION ONCE
Refills: 0 | Status: COMPLETED | OUTPATIENT
Start: 2023-07-26 | End: 2023-07-26

## 2023-07-26 RX ORDER — CEFPODOXIME PROXETIL 100 MG
1 TABLET ORAL
Qty: 0 | Refills: 0 | DISCHARGE
Start: 2023-07-26 | End: 2023-07-29

## 2023-07-26 RX ORDER — SODIUM,POTASSIUM PHOSPHATES 278-250MG
1 POWDER IN PACKET (EA) ORAL
Refills: 0 | Status: DISCONTINUED | OUTPATIENT
Start: 2023-07-26 | End: 2023-07-26

## 2023-07-26 RX ADMIN — ENOXAPARIN SODIUM 40 MILLIGRAM(S): 100 INJECTION SUBCUTANEOUS at 07:02

## 2023-07-26 RX ADMIN — Medication 200 MILLIGRAM(S): at 06:57

## 2023-07-26 RX ADMIN — Medication 25 GRAM(S): at 13:03

## 2023-07-26 RX ADMIN — Medication 12.5 MILLIGRAM(S): at 06:55

## 2023-07-26 RX ADMIN — Medication 30 MILLIGRAM(S): at 13:05

## 2023-07-26 RX ADMIN — SODIUM CHLORIDE 500 MILLILITER(S): 9 INJECTION, SOLUTION INTRAVENOUS at 07:00

## 2023-07-26 RX ADMIN — PANTOPRAZOLE SODIUM 40 MILLIGRAM(S): 20 TABLET, DELAYED RELEASE ORAL at 13:03

## 2023-07-26 RX ADMIN — PIPERACILLIN AND TAZOBACTAM 25 GRAM(S): 4; .5 INJECTION, POWDER, LYOPHILIZED, FOR SOLUTION INTRAVENOUS at 06:55

## 2023-07-26 RX ADMIN — Medication 975 MILLIGRAM(S): at 00:36

## 2023-07-26 RX ADMIN — Medication 975 MILLIGRAM(S): at 13:04

## 2023-07-26 RX ADMIN — Medication 975 MILLIGRAM(S): at 07:27

## 2023-07-26 RX ADMIN — Medication 975 MILLIGRAM(S): at 13:54

## 2023-07-26 RX ADMIN — Medication 975 MILLIGRAM(S): at 06:57

## 2023-07-26 RX ADMIN — PIPERACILLIN AND TAZOBACTAM 25 GRAM(S): 4; .5 INJECTION, POWDER, LYOPHILIZED, FOR SOLUTION INTRAVENOUS at 13:04

## 2023-07-26 RX ADMIN — Medication 1 PACKET(S): at 07:01

## 2023-07-26 NOTE — DISCHARGE NOTE PROVIDER - HOSPITAL COURSE
Ms. Rocha is a 78F who presented with perforated diverticulitis.  She was initially managed medically but underwent an exploratory laparotomy and arlene's procedure on Ms. Rocha is a 78F who presented with perforated diverticulitis.  She was initially managed medically but underwent an exploratory laparotomy and arlene's procedure on hospital day 6 due to increasing leukocytosis while on IV antibiotics.  The operation was uncomplicated.  After the operation she spent 2 days in the SICU before being downgraded.  While on the floor an NG tube had to be placed due to nausea and vomiting, which was removed after 2 days.  Ms. Rocha continued to progress well on IV antibiotics and her white count gradually came down.  Her wound vac management was uncomplicated during this time. Ms. Rocha is a 78F who presented with perforated diverticulitis.  She was initially managed medically but underwent an exploratory laparotomy and arlene's procedure on hospital day 6 due to increasing leukocytosis while on IV antibiotics.  The operation was uncomplicated.  After the operation she spent 2 days in the SICU before being downgraded.  While on the floor an NG tube had to be placed due to nausea and vomiting, which was removed after 2 days.  Ms. Rocha continued to progress well on IV antibiotics and her white count gradually came down.  She has a wound vac in place following her ex lap.  The wound vac's management was uncomplicated during this time.  Her white count continued to decrease.  She will be discharged today to subacute rehab on oral antibiotics per Dr. Blake's recommendations.

## 2023-07-26 NOTE — DISCHARGE NOTE PROVIDER - NSDCMRMEDTOKEN_GEN_ALL_CORE_FT
carBAMazepine 200 mg oral tablet: 1 orally 2 times a day  hydroCHLOROthiazide 12.5 mg oral tablet: 1 orally once a day  lisinopril 5 mg oral tablet: 1 orally once a day  PARoxetine 40 mg oral tablet: 1 orally once a day  Plavix 75 mg oral tablet: 1 orally once a day  rosuvastatin 40 mg oral tablet: 1 orally once a day  Toprol-XL 25 mg oral tablet, extended release: 1 orally once a day   carBAMazepine 200 mg oral tablet: 1 orally 2 times a day  fluconazole 200 mg oral tablet: 1 orally once a day  hydroCHLOROthiazide 12.5 mg oral tablet: 1 orally once a day  lisinopril 5 mg oral tablet: 1 orally once a day  metroNIDAZOLE 500 mg oral tablet: 1 orally every 8 hours  PARoxetine 40 mg oral tablet: 1 orally once a day  Plavix 75 mg oral tablet: 1 orally once a day  rosuvastatin 40 mg oral tablet: 1 orally once a day  Toprol-XL 25 mg oral tablet, extended release: 1 orally once a day  Vantin 200 mg oral tablet: 1 tab(s) orally 2 times a day

## 2023-07-26 NOTE — DISCHARGE NOTE NURSING/CASE MANAGEMENT/SOCIAL WORK - PATIENT PORTAL LINK FT
You can access the FollowMyHealth Patient Portal offered by [facility] by registering at the following website: [email or web address]. By joining [employer]’s FollowMyHealth portal, you will also be able to view your health information using other applications (apps) compatible with our system.

## 2023-07-26 NOTE — PROGRESS NOTE ADULT - SUBJECTIVE AND OBJECTIVE BOX
[facility]                                                INFECTIOUS DISEASES  =======================================================                               Zay Patterson MD#    Jessica Larose MD*                           Christin Blake MD*   Samantha Gallardo MD*            Diplomates American Board of Internal Medicine & Infectious Diseases                  # [place] Office - Appt - Tel  [phone] Fax [phone]                * [place] Office - Appt - Tel [phone] Fax [phone]                                  Hospital Consult line:  [phone]  =======================================================      [id number]  LINDA BOYLE   follow up for: perforated diverticulitis  vac placed  robison removed, per daughter some bleeding was noted at that time  pt denies pain  patient seen and examined.       I have personally reviewed the labs and data; pertinent labs and data are listed in this note; please see below.   ===================================================  REVIEW OF SYSTEMS:  CONSTITUTIONAL:  No Fever or chills  HEENT:  No diplopia or blurred vision.  No earache, sore throat or runny nose.  CARDIOVASCULAR:  No pressure, squeezing, strangling, tightness, heaviness or aching about the chest, neck, axilla or epigastrium.  RESPIRATORY:  No cough, shortness of breath  GASTROINTESTINAL:  No nausea, vomiting or diarrhea.  GENITOURINARY:  No dysuria, frequency or urgency. No Blood in urine  MUSCULOSKELETAL:  no joint aches, no muscle pain  SKIN:  No change in skin, hair or nails.  NEUROLOGIC:  No Headaches, seizures or weakness.  PSYCHIATRIC:  No disorder of thought or mood.  ENDOCRINE:  No heat or cold intolerance  HEMATOLOGICAL:  No easy bruising or bleeding.    =======================================================  Allergies    No Known Allergies    Intolerances    Antibiotics:  caspofungin IVPB      caspofungin IVPB 50 milliGRAM(s) IV Intermittent every 24 hours  piperacillin/tazobactam IVPB.. 3.375 Gram(s) IV Intermittent every 8 hours    Other medications:  acetaminophen     Tablet .. 975 milliGRAM(s) Oral every 6 hours  atorvastatin 40 milliGRAM(s) Oral at bedtime  carBAMazepine 200 milliGRAM(s) Oral two times a day  dextrose 5%. 1000 milliLiter(s) IV Continuous <Continuous>  dextrose 5%. 1000 milliLiter(s) IV Continuous <Continuous>  dextrose 50% Injectable 12.5 Gram(s) IV Push once  dextrose 50% Injectable 25 Gram(s) IV Push once  enoxaparin Injectable 40 milliGRAM(s) SubCutaneous every 24 hours  glucagon  Injectable 1 milliGRAM(s) IntraMuscular once  metoprolol tartrate 12.5 milliGRAM(s) Oral every 12 hours  pantoprazole  Injectable 40 milliGRAM(s) IV Push daily  PARoxetine 30 milliGRAM(s) Oral daily    ======================================================  Physical Exam:  ============  T(F): 98.1 (21 Jul 2023 18:03), Max: 99 (21 Jul 2023 05:00)  HR: 75 (21 Jul 2023 18:03)  BP: 156/84 (21 Jul 2023 18:03)  RR: 18 (21 Jul 2023 18:03)  SpO2: 93% (21 Jul 2023 18:03) (91% - 98%)  temp max in last 48H T(F): , Max: 99 (07-21-23 @ 05:00)    General:  No acute distress.  Eye: no conjunctival pallor, no scleral icterus  Neck: Supple, No lymphadenopathy.  Respiratory: Lungs are clear to auscultation, Respirations are non-labored.  Cardiovascular: Normal rate, Regular rhythm,  s1+s2  Gastrointestinal: Soft, Non-tender, Non-distended, Normal bowel sounds. vac midline, rlq olivia serosanguinous and llq ostomy  Genitourinary: No costovertebral angle tenderness.  Lymphatics: No lymphadenopathy neck  Integumentary: No rash.b/l le edema  Neurologic: Alert, Oriented, No focal deficits  Psychiatric: Appropriate mood & affect.  =======================================================  Labs:                        7.8    20.38 )-----------( 586      ( 21 Jul 2023 09:05 )             24.9     07-21    140  |  101  |  6.3<L>  ----------------------------<  108<H>  3.2<L>   |  27.0  |  0.50    Ca    7.5<L>      21 Jul 2023 13:09  Phos  2.2     07-21  Mg     1.8     07-21        Culture - Blood (collected 07-18-23 @ 10:40)  Source: .Blood Blood-Peripheral    Culture - Blood (collected 07-18-23 @ 10:34)  Source: .Blood Blood-Peripheral    Culture - Surgical Swab (collected 07-15-23 @ 10:45)  Source: .Surgical Swab #2 - Abscess of Peritoneum  Final Report (07-19-23 @ 16:23):    Few Klebsiella pneumoniae    Rare Escherichia coli    Rare Candida albicans    Moderate Bacteroides ovatus group  Organism: Klebsiella pneumoniae  Escherichia coli (07-20-23 @ 13:00)  Organism: Escherichia coli (07-20-23 @ 13:00)    Sensitivities:      Method Type: KIYA      -  Amikacin: S <=16      -  Amoxicillin/Clavulanic Acid: S <=8/4      -  Ampicillin: S <=8 These ampicillin results predict results for amoxicillin      -  Ampicillin/Sulbactam: S <=4/2 Enterobacter, Klebsiella aerogenes, Citrobacter, and Serratia may develop resistance during prolonged therapy (3-4 days)      -  Aztreonam: S <=4      -  Cefazolin: S <=2 Enterobacter, Klebsiella aerogenes, Citrobacter, and Serratia may develop resistance during prolonged therapy (3-4 days)      -  Cefepime: S <=2      -  Cefoxitin: S <=8      -  Ceftriaxone: S <=1 Enterobacter, Klebsiella aerogenes, Citrobacter, and Serratia may develop resistance during prolonged therapy      -  Ciprofloxacin: R >2      -  Ertapenem: S <=0.5      -  Gentamicin: S <=2      -  Imipenem: S <=1      -  Levofloxacin: R >4      -  Meropenem: S <=1      -  Piperacillin/Tazobactam: S <=8      -  Tobramycin: S 4      -  Trimethoprim/Sulfamethoxazole: S <=0.5/9.5  Organism: Klebsiella pneumoniae (07-17-23 @ 18:11)    Sensitivities:      Method Type: KIYA      -  Amikacin: S <=16      -  Amoxicillin/Clavulanic Acid: S <=8/4      -  Ampicillin: R >16 These ampicillin results predict results for amoxicillin      -  Ampicillin/Sulbactam: S 8/4 Enterobacter, Klebsiella aerogenes, Citrobacter, and Serratia may develop resistance during prolonged therapy (3-4 days)      -  Aztreonam: S <=4      -  Cefazolin: S <=2 Enterobacter, Klebsiella aerogenes, Citrobacter, and Serratia may develop resistance during prolonged therapy (3-4 days)      -  Cefepime: S <=2      -  Cefoxitin: S <=8      -  Ceftriaxone: S <=1 Enterobacter, Klebsiella aerogenes, Citrobacter, and Serratia may develop resistance during prolonged therapy      -  Ciprofloxacin: S <=0.25      -  Ertapenem: S <=0.5      -  Gentamicin: S <=2      -  Imipenem: S <=1      -  Levofloxacin: S <=0.5      -  Meropenem: S <=1      -  Piperacillin/Tazobactam: S <=8      -  Tobramycin: S 4      -  Trimethoprim/Sulfamethoxazole: S <=0.5/9.5    Culture - Surgical Swab (collected 07-15-23 @ 10:45)  Source: .Surgical Swab #1 Abscess of peritoneum  Final Report (07-17-23 @ 18:14):    Culture yields >4 types of aerobic and/or anaerobic bacteria    Call client services within 7 days if further workup is clinically    indicated.    Culture includes    Few Klebsiella pneumoniae    See previous culture [id number]    Culture - Urine (collected 07-09-23 @ 07:13)  Source: Catheterized Catheterized  Final Report (07-10-23 @ 14:09):    <10,000 CFU/mL Normal Urogenital Maddie    Culture - Blood (collected 07-09-23 @ 06:00)  Source: .Blood Blood  Final Report (07-14-23 @ 12:01):    No growth at 5 days    Culture - Blood (collected 07-09-23 @ 06:00)  Source: .Blood Blood  Final Report (07-14-23 @ 12:01):    No growth at 5 days        
                                           [facility]                                                INFECTIOUS DISEASES  =======================================================                               Zay Patterson MD#    Jessica Larose MD*                           Christin Blake MD*   Samantha Gallardo MD*            Diplomates American Board of Internal Medicine & Infectious Diseases                  # [place] Office - Appt - Tel  [phone] Fax [phone]                * [place] Office - Appt - Tel [phone] Fax [phone]                                  Hospital Consult line:  [phone]  =======================================================      [id number]  LINDA BOYLE   follow up for: perforated diverticulitis     - pt underwent repeat cta/p 7/14 due to leukocytosis and was found to have developing perisigmoid abscess  - now s/p 7/15 exp lap, hartmanns procedure, found to have feculent peritonitis and pelvic abscess  - post op hypotension suspected due to acute blood loss anemia secondary to  bleeding vein at colostomy which was cauterized       now off pressors  feels well  on liquids    patient seen and examined.       I have personally reviewed the labs and data; pertinent labs and data are listed in this note; please see below.   ===================================================  REVIEW OF SYSTEMS:  CONSTITUTIONAL:  No Fever or chills  HEENT:  No diplopia or blurred vision.  No earache, sore throat or runny nose.  CARDIOVASCULAR:  No pressure, squeezing, strangling, tightness, heaviness or aching about the chest, neck, axilla or epigastrium.  RESPIRATORY:  No cough, shortness of breath  GASTROINTESTINAL:  No nausea, vomiting or diarrhea.  GENITOURINARY:  No dysuria, frequency or urgency. No Blood in urine  MUSCULOSKELETAL:  no joint aches, no muscle pain  SKIN:  No change in skin, hair or nails.  NEUROLOGIC:  No Headaches, seizures or weakness.  PSYCHIATRIC:  No disorder of thought or mood.  ENDOCRINE:  No heat or cold intolerance  HEMATOLOGICAL:  No easy bruising or bleeding.    =======================================================  Allergies    No Known Allergies    Intolerances    Antibiotics:  piperacillin/tazobactam IVPB.. 3.375 Gram(s) IV Intermittent every 8 hours    Other medications:  atorvastatin 40 milliGRAM(s) Oral at bedtime  carBAMazepine 200 milliGRAM(s) Oral two times a day  dextrose 5%. 1000 milliLiter(s) IV Continuous <Continuous>  dextrose 5%. 1000 milliLiter(s) IV Continuous <Continuous>  dextrose 50% Injectable 25 Gram(s) IV Push once  dextrose 50% Injectable 25 Gram(s) IV Push once  dextrose 50% Injectable 12.5 Gram(s) IV Push once  enoxaparin Injectable 40 milliGRAM(s) SubCutaneous every 24 hours  glucagon  Injectable 1 milliGRAM(s) IntraMuscular once  insulin lispro (ADMELOG) corrective regimen sliding scale   SubCutaneous every 6 hours  metoprolol tartrate 12.5 milliGRAM(s) Oral every 12 hours  PARoxetine 30 milliGRAM(s) Oral daily  potassium chloride   Powder 40 milliEquivalent(s) Oral once    ======================================================  Physical Exam:  ============  T(F): 98.3 (17 Jul 2023 18:30), Max: 98.6 (17 Jul 2023 09:15)  HR: 82 (17 Jul 2023 18:30)  BP: 150/62 (17 Jul 2023 18:30)  RR: 18 (17 Jul 2023 18:30)  SpO2: 96% (17 Jul 2023 18:30) (89% - 100%)  temp max in last 48H T(F): , Max: 98.6 (07-16-23 @ 07:26)    General:  No acute distress.  Eye: no conjunctival pallor, no scleral icterus  Neck: Supple, No lymphadenopathy.  Respiratory: Lungs are clear to auscultation, Respirations are non-labored.  Cardiovascular: Normal rate, Regular rhythm,  s1+s2  Gastrointestinal: Soft, Non-tender, Non-distended, Normal bowel sounds. abdominal wound with packing in place, ostomy intact, olivia serosanguinous  Genitourinary: No costovertebral angle tenderness.  Integumentary: b/l le edema  Neurologic: Alert, Oriented, No focal deficits  Psychiatric: Appropriate mood & affect.  =======================================================  Labs:                        8.5    23.65 )-----------( 415      ( 17 Jul 2023 12:10 )             25.1     07-17    138  |  102  |  10.9  ----------------------------<  197<H>  3.7   |  25.0  |  0.65    Ca    7.7<L>      17 Jul 2023 02:11  Phos  3.2     07-16  Mg     2.3     07-17        Culture - Surgical Swab (collected 07-15-23 @ 10:45)  Source: .Surgical Swab #2 - Abscess of Peritoneum  Final Report (07-17-23 @ 18:11):    Culture yields >4 types of aerobic and/or anaerobic bacteria    Call client services within 7 days if further workup is clinically    indicated.    including    Few Klebsiella pneumoniae  Organism: Klebsiella pneumoniae (07-17-23 @ 18:11)  Organism: Klebsiella pneumoniae (07-17-23 @ 18:11)    Sensitivities:      Method Type: KIYA      -  Amikacin: S <=16      -  Amoxicillin/Clavulanic Acid: S <=8/4      -  Ampicillin: R >16 These ampicillin results predict results for amoxicillin      -  Ampicillin/Sulbactam: S 8/4 Enterobacter, Klebsiella aerogenes, Citrobacter, and Serratia may develop resistance during prolonged therapy (3-4 days)      -  Aztreonam: S <=4      -  Cefazolin: S <=2 Enterobacter, Klebsiella aerogenes, Citrobacter, and Serratia may develop resistance during prolonged therapy (3-4 days)      -  Cefepime: S <=2      -  Cefoxitin: S <=8      -  Ceftriaxone: S <=1 Enterobacter, Klebsiella aerogenes, Citrobacter, and Serratia may develop resistance during prolonged therapy      -  Ciprofloxacin: S <=0.25      -  Ertapenem: S <=0.5      -  Gentamicin: S <=2      -  Imipenem: S <=1      -  Levofloxacin: S <=0.5      -  Meropenem: S <=1      -  Piperacillin/Tazobactam: S <=8      -  Tobramycin: S 4      -  Trimethoprim/Sulfamethoxazole: S <=0.5/9.5    Culture - Surgical Swab (collected 07-15-23 @ 10:45)  Source: .Surgical Swab #1 Abscess of peritoneum  Final Report (07-17-23 @ 18:14):    Culture yields >4 types of aerobic and/or anaerobic bacteria    Call client services within 7 days if further workup is clinically    indicated.    Culture includes    Few Klebsiella pneumoniae    See previous culture [id number]    Culture - Urine (collected 07-09-23 @ 07:13)  Source: Catheterized Catheterized  Final Report (07-10-23 @ 14:09):    <10,000 CFU/mL Normal Urogenital Maddie    Culture - Blood (collected 07-09-23 @ 06:00)  Source: .Blood Blood  Final Report (07-14-23 @ 12:01):    No growth at 5 days    Culture - Blood (collected 07-09-23 @ 06:00)  Source: .Blood Blood  Final Report (07-14-23 @ 12:01):    No growth at 5 days      < from: CT Abdomen and Pelvis w/ Oral Cont and w/ IV Cont (07.14.23 @ 14:51) >  IMPRESSION:  Redemonstrated severe sigmoid diverticulitis with focal perforation and   developing perisigmoid abscess which is not yet completely organized.    Previously seen remote free air has improved.    Left adrenal nodule larger than in 2018. Recommend MRI for   characterization.    Findings were discussed with Dr. COLLINS MOSS [phone] 7/14/2023 5:05   PM by Dr. Ligia Amador with read back confirmation.    ---End of Report ---    < end of copied text >    
                                           [facility]                                                INFECTIOUS DISEASES  =======================================================                               Zay Patterson MD#    Jessica Larose MD*                           Christin Blake MD*   Samantha Gallardo MD*            Diplomates American Board of Internal Medicine & Infectious Diseases                  # [place] Office - Appt - Tel  [phone] Fax [phone]                * [place] Office - Appt - Tel [phone] Fax [phone]                                  Hospital Consult line:  [phone]  =======================================================      N-[id number]  LINDA BOYLE   follow up for: perforated diverticulitis  afebrile  wbc uptrending  pt reports tolerated clears last night but had loose stool x 3 since then  has some rlq pain  no nausea, vomiting  patient seen and examined.       I have personally reviewed the labs and data; pertinent labs and data are listed in this note; please see below.   ===================================================  REVIEW OF SYSTEMS:  CONSTITUTIONAL:  No Fever or chills  HEENT:  No diplopia or blurred vision.  No earache, sore throat or runny nose.  CARDIOVASCULAR:  No pressure, squeezing, strangling, tightness, heaviness or aching about the chest, neck, axilla or epigastrium.  RESPIRATORY:  No cough, shortness of breath  GASTROINTESTINAL:  No nausea, vomiting + diarrhea.  GENITOURINARY:  No dysuria, frequency or urgency. No Blood in urine  MUSCULOSKELETAL:  no joint aches, no muscle pain  SKIN:  No change in skin, hair or nails.  NEUROLOGIC:  No Headaches, seizures or weakness.  PSYCHIATRIC:  No disorder of thought or mood.  ENDOCRINE:  No heat or cold intolerance  HEMATOLOGICAL:  No easy bruising or bleeding.    =======================================================  Allergies    No Known Allergies    Intolerances    Antibiotics:  piperacillin/tazobactam IVPB.. 3.375 Gram(s) IV Intermittent every 8 hours    Other medications:  acetaminophen     Tablet .. 975 milliGRAM(s) Oral every 6 hours  atorvastatin 40 milliGRAM(s) Oral at bedtime  carBAMazepine 200 milliGRAM(s) Oral two times a day  clopidogrel Tablet 75 milliGRAM(s) Oral daily  enoxaparin Injectable 40 milliGRAM(s) SubCutaneous every 24 hours  hydrochlorothiazide 12.5 milliGRAM(s) Oral daily  ibuprofen  Tablet. 400 milliGRAM(s) Oral every 6 hours  lactated ringers. 1000 milliLiter(s) IV Continuous <Continuous>  lisinopril 5 milliGRAM(s) Oral daily  metoprolol succinate ER 25 milliGRAM(s) Oral daily  PARoxetine 30 milliGRAM(s) Oral daily    ======================================================  Physical Exam:  ============  T(F): 98.1 (14 Jul 2023 10:01), Max: 98.5 (13 Jul 2023 14:22)  HR: 58 (14 Jul 2023 10:01)  BP: 118/75 (14 Jul 2023 10:01)  RR: 17 (14 Jul 2023 10:01)  SpO2: 95% (14 Jul 2023 10:01) (94% - 96%)  temp max in last 48H T(F): , Max: 98.9 (07-13-23 @ 04:26)    General:  No acute distress.  Eye: no conjunctival pallor, no scleral icterus  Neck: Supple, No lymphadenopathy.  Respiratory: Lungs are clear to auscultation, Respirations are non-labored.  Cardiovascular: Normal rate, Regular rhythm,  s1+s2  Gastrointestinal: Soft, rlq -tender, Non-distended, Normal bowel sounds.  Integumentary: No rash. b/l Le pitting edema    =======================================================  Labs:                        10.3   20.61 )-----------( 463      ( 14 Jul 2023 06:25 )             31.7     07-14    143  |  106  |  9.1  ----------------------------<  122<H>  3.3<L>   |  24.0  |  0.76    Ca    8.6      14 Jul 2023 06:25  Phos  2.9     07-14  Mg     2.1     07-14        Culture - Urine (collected 07-09-23 @ 07:13)  Source: Catheterized Catheterized  Final Report (07-10-23 @ 14:09):    <10,000 CFU/mL Normal Urogenital Maddie    Culture - Blood (collected 07-09-23 @ 06:00)  Source: .Blood Blood    Culture - Blood (collected 07-09-23 @ 06:00)  Source: .Blood Blood        
  INTERVAL HPI/OVERNIGHT EVENTS/SUBJECTIVE:  Pt requiring jackson overnight.  Admits to mild generalized abdominal pain.  Denies CP, NV, sob or other CO.  Dr Marie identified and cauterized small venous bleeder inside ostomy which lead to significantly decreased bloody output from ostomy.    ICU Vital Signs Last 24 Hrs  T(C): 36.9 (16 Jul 2023 23:15), Max: 37 (16 Jul 2023 07:26)  T(F): 98.5 (16 Jul 2023 23:15), Max: 98.6 (16 Jul 2023 07:26)  HR: 78 (17 Jul 2023 02:45) (65 - 91)  BP: 112/46 (17 Jul 2023 02:45) (88/59 - 134/53)  BP(mean): 62 (17 Jul 2023 02:45) (52 - 97)  ABP: 110/81 (16 Jul 2023 07:30) (86/66 - 157/63)  ABP(mean): 86 (16 Jul 2023 07:30) (57 - 87)  RR: 15 (17 Jul 2023 02:45) (13 - 23)  SpO2: 92% (17 Jul 2023 02:45) (89% - 100%)    O2 Parameters below as of 17 Jul 2023 00:00  Patient On (Oxygen Delivery Method): room air            I&O's Detail    15 Jul 2023 07:01  -  16 Jul 2023 07:00  --------------------------------------------------------  IN:    IV PiggyBack: 100 mL    Lactated Ringers: 375 mL    Lactated Ringers: 1000 mL    multiple electrolytes Injection Type 1.: 1200 mL    Phenylephrine: 289.6 mL  Total IN: 2964.6 mL    OUT:    Bulb (mL): 290 mL    Colostomy (mL): 550 mL    Indwelling Catheter - Urethral (mL): 1130 mL  Total OUT: 1970 mL    Total NET: 994.6 mL      16 Jul 2023 07:01  -  17 Jul 2023 03:13  --------------------------------------------------------  IN:    IV PiggyBack: 50 mL    IV PiggyBack: 200 mL    IV PiggyBack: 100 mL    IV PiggyBack: 275 mL    multiple electrolytes Injection Type 1 Bolus: 1000 mL    multiple electrolytes Injection Type 1.: 1000 mL    Norepinephrine: 54.5 mL    Oral Fluid: 600 mL    Phenylephrine: 338.2 mL    PRBCs (Packed Red Blood Cells): 979 mL  Total IN: 4596.7 mL    OUT:    Bulb (mL): 120 mL    Colostomy (mL): 775 mL    Indwelling Catheter - Urethral (mL): 1380 mL  Total OUT: 2275 mL    Total NET: 2321.7 mL            ABG - ( 15 Jul 2023 16:33 )  pH, Arterial: 7.340 pH, Blood: x     /  pCO2: 48    /  pO2: 98    / HCO3: 26    / Base Excess: 0.1   /  SaO2: 99.7                MEDICATIONS  (STANDING):  acetaminophen   IVPB .. 1000 milliGRAM(s) IV Intermittent every 6 hours  atorvastatin 40 milliGRAM(s) Oral at bedtime  carBAMazepine 200 milliGRAM(s) Oral two times a day  enoxaparin Injectable 40 milliGRAM(s) SubCutaneous every 24 hours  HYDROmorphone PCA (1 mG/mL) 30 milliLiter(s) PCA Continuous PCA Continuous  multiple electrolytes Injection Type 1 1000 milliLiter(s) (125 mL/Hr) IV Continuous <Continuous>  norepinephrine Infusion 0.05 MICROgram(s)/kG/Min (11.9 mL/Hr) IV Continuous <Continuous>  PARoxetine 30 milliGRAM(s) Oral daily  phenylephrine    Infusion 0.5 MICROgram(s)/kG/Min (23.8 mL/Hr) IV Continuous <Continuous>  piperacillin/tazobactam IVPB.. 3.375 Gram(s) IV Intermittent every 8 hours  potassium chloride   Powder 40 milliEquivalent(s) Oral once  potassium chloride  10 mEq/100 mL IVPB 10 milliEquivalent(s) IV Intermittent every 1 hour    MEDICATIONS  (PRN):  naloxone Injectable 0.1 milliGRAM(s) IV Push every 3 minutes PRN For ANY of the following changes in patient status:  A. RR LESS THAN 10 breaths per minute, B. Oxygen saturation LESS THAN 90%, C. Sedation score of 6  ondansetron Injectable 4 milliGRAM(s) IV Push every 6 hours PRN Nausea      PHYSICAL EXAM:     Gen:  No cyanosis, Pallor.     Eyes: PERRL ~ 3mm, EOMI,     Neurological: A&Ox3, GCS 15, No focal defficit.     ENMT: Clear canals, clear throat.      Neck: Supple. NT AT, FROM no pain.  No JVD. No meningeal signs    Pulmonary: NAD, CTA, = BL .      Cardiovascular: RRR, S1, S2, No Murmurs, rubs or gallops noted.    Gastrointestinal:CDI midline lower abd dressing.  Ostomy purple appearing. No blood currently in bag. ND, Soft, NT.    Extremities: NT, AT, no edema, erythema or palpable cord noted.  FROM, = 2+ pulses throughout.      LABS:  CBC Full  -  ( 17 Jul 2023 02:11 )  WBC Count : 28.04 K/uL  RBC Count : 2.89 M/uL  Hemoglobin : 8.9 g/dL  Hematocrit : 27.0 %  Platelet Count - Automated : 447 K/uL  Mean Cell Volume : 93.4 fl  Mean Cell Hemoglobin : 30.8 pg  Mean Cell Hemoglobin Concentration : 33.0 gm/dL  Auto Neutrophil # : x  Auto Lymphocyte # : x  Auto Monocyte # : x  Auto Eosinophil # : x  Auto Basophil # : x  Auto Neutrophil % : x  Auto Lymphocyte % : x  Auto Monocyte % : x  Auto Eosinophil % : x  Auto Basophil % : x    07-17    138  |  102  |  10.9  ----------------------------<  197<H>  3.7   |  25.0  |  0.65    Ca    7.7<L>      17 Jul 2023 02:11  Phos  3.2     07-16  Mg     2.3     07-17    TPro  6.6  /  Alb  2.7<L>  /  TBili  <0.2<L>  /  DBili  x   /  AST  37<H>  /  ALT  33<H>  /  AlkPhos  111  07-15    PT/INR - ( 17 Jul 2023 02:11 )   PT: 22.2 sec;   INR: 1.90 ratio         PTT - ( 17 Jul 2023 02:11 )  PTT:34.1 sec  Urinalysis Basic - ( 17 Jul 2023 02:11 )    Color: x / Appearance: x / SG: x / pH: x  Gluc: 197 mg/dL / Ketone: x  / Bili: x / Urobili: x   Blood: x / Protein: x / Nitrite: x   Leuk Esterase: x / RBC: x / WBC x   Sq Epi: x / Non Sq Epi: x / Bacteria: x      RECENT CULTURES:  07-15 .Surgical Swab #1 Abscess of peritoneum XXXX XXXX   Few Klebsiella pneumoniae  See previous culture [id number]        LIVER FUNCTIONS - ( 15 Jul 2023 08:39 )  Alb: 2.7 g/dL / Pro: 6.6 g/dL / ALK PHOS: 111 U/L / ALT: 33 U/L / AST: 37 U/L / GGT: x               CAPILLARY BLOOD GLUCOSE      RADIOLOGY & ADDITIONAL STUDIES:    ASSESSMENT/PLAN:  78yFemale presenting with: Perforated diverticulitis POD #1 from Mirtha's procedure.  Septic shock combined with hemorrhagic/hypovolemic shock from acute blood loss anemia in setting of elevated INR.      Neurological: Pt has Dilauadid PCA.  I will continue this and I have added Ofirmev.     Pulmonary: I have ordered Incentive spirometry.     Cardiovascular: I have performed POCUS:   IVC Highly variable.  Heart: Good gross systolic function.  No pericardial effusion or R heart dilation. This suggests hypovolemia.  May have some septic shock component.  I have transitioned to Levo and DC'd Jackson. I have given 2 additional PRBC on my shift (Total 3) Hgb finally more appropriately responding after hemostasis from cautery performed by Dr Marie.  I have ordered Fibrinogen and INR.  INR is 1.9.  Given persistent need for pressors, I will transfuse FFP x 2 to help improve volume and reduce pressors as well as have a more balanced resuscitation in hemorrhagic shock.     Gastrointestinal: NPO/ Plyte @ 125.    Genitourinary: Urine out put is at times mildly inadequate for patient's weight.  FFP Transfusion may help.  I will avoid hypotension and nephrotoxins.    Heme: Lovenox currently ordered for AM.  I suspect prophylactic dose should be fine in AM.  I will DC if still hemodynamically unwell.     ID: Zosyn.  FU CX.    Lines/ Tubes: Levo requirement has come down after volume resuscitation.  If levo requirement increases again, I will place TLC. Rodriguez needed in critical care.  No other indications for invasive lines or tubes at this time.     Dispo: Pt critically ill.  I am supporting and manipulating CV System with Blood transfusion and IV Vassopressors to prevent further organ dysfunction and death.      CRITICAL CARE TIME SPENT:  57 minutes,.
Feels ok OOB  afebriel   Abd sof t nontender  wound vac inplace  colostomy functioning  wbc 16  doing well   cont present treatment   supportive care
feels good   afebriel   abd soft nontender  Colstomy functioing well   LEV serous   wound vavc in place   wbvc 18 down   surgically stable    supportive care   cont present treatment   K+ 3.3   will replete K+
Feels ok   afebrile   abd sof t nontender  Colostomy functioning     decreasin g  wbc 23   hct 28   surgically improving slowly post op ileus   supportive care
INTERVAL HPI/OVERNIGHT EVENTS:    Patient evaluated at bedside. No acute distress. No acute events overnight.    MEDICATIONS  (STANDING):  acetaminophen   IVPB .. 1000 milliGRAM(s) IV Intermittent every 6 hours  atorvastatin 40 milliGRAM(s) Oral at bedtime  carBAMazepine 200 milliGRAM(s) Oral two times a day  enoxaparin Injectable 40 milliGRAM(s) SubCutaneous every 24 hours  HYDROmorphone PCA (1 mG/mL) 30 milliLiter(s) PCA Continuous PCA Continuous  multiple electrolytes Injection Type 1 1000 milliLiter(s) (125 mL/Hr) IV Continuous <Continuous>  norepinephrine Infusion 0.05 MICROgram(s)/kG/Min (11.9 mL/Hr) IV Continuous <Continuous>  PARoxetine 30 milliGRAM(s) Oral daily  piperacillin/tazobactam IVPB.. 3.375 Gram(s) IV Intermittent every 8 hours  potassium chloride   Powder 40 milliEquivalent(s) Oral once    MEDICATIONS  (PRN):  naloxone Injectable 0.1 milliGRAM(s) IV Push every 3 minutes PRN For ANY of the following changes in patient status:  A. RR LESS THAN 10 breaths per minute, B. Oxygen saturation LESS THAN 90%, C. Sedation score of 6  ondansetron Injectable 4 milliGRAM(s) IV Push every 6 hours PRN Nausea      Vital Signs Last 24 Hrs  T(C): 36.8 (17 Jul 2023 08:00), Max: 37 (16 Jul 2023 20:00)  T(F): 98.3 (17 Jul 2023 08:00), Max: 98.6 (16 Jul 2023 20:00)  HR: 79 (17 Jul 2023 08:00) (65 - 104)  BP: 102/54 (17 Jul 2023 08:00) (88/59 - 139/91)  BP(mean): 69 (17 Jul 2023 08:00) (52 - 105)  RR: 18 (17 Jul 2023 08:00) (13 - 23)  SpO2: 96% (17 Jul 2023 08:00) (89% - 98%)    Parameters below as of 17 Jul 2023 08:00  Patient On (Oxygen Delivery Method): room air        Constitutional: NAD  HEENT: PERRLA, EOMI, no drainage or redness  Respiratory: Breath Sounds equal & clear to percussion & auscultation, no accessory muscle use  Cardiovascular: Regular rate & rhythm, normal S1, S2; no murmurs, gallops or rubs; no S3, S4  Gastrointestinal: Lower midline wound, clean, dressing changed at bedside, Drain with serosanguinous output, colostomy pink and viable, active active           I&O's Detail    16 Jul 2023 07:01  -  17 Jul 2023 07:00  --------------------------------------------------------  IN:    IV PiggyBack: 50 mL    IV PiggyBack: 100 mL    IV PiggyBack: 300 mL    IV PiggyBack: 300 mL    IV PiggyBack: 300 mL    multiple electrolytes Injection Type 1 Bolus: 1000 mL    multiple electrolytes Injection Type 1.: 2500 mL    Norepinephrine: 73.7 mL    Oral Fluid: 600 mL    Phenylephrine: 338.2 mL    Plasma: 319 mL    PRBCs (Packed Red Blood Cells): 979 mL  Total IN: 6859.9 mL    OUT:    Bulb (mL): 150 mL    Colostomy (mL): 775 mL    Indwelling Catheter - Urethral (mL): 1545 mL  Total OUT: 2470 mL    Total NET: 4389.9 mL      17 Jul 2023 07:01  -  17 Jul 2023 08:28  --------------------------------------------------------  IN:    Norepinephrine: 2.4 mL    Plasma: 201 mL  Total IN: 203.4 mL    OUT:    Indwelling Catheter - Urethral (mL): 175 mL  Total OUT: 175 mL    Total NET: 28.4 mL          LABS:                        8.9    28.04 )-----------( 447      ( 17 Jul 2023 02:11 )             27.0     07-17    138  |  102  |  10.9  ----------------------------<  197<H>  3.7   |  25.0  |  0.65    Ca    7.7<L>      17 Jul 2023 02:11  Phos  3.2     07-16  Mg     2.3     07-17    TPro  6.6  /  Alb  2.7<L>  /  TBili  <0.2<L>  /  DBili  x   /  AST  37<H>  /  ALT  33<H>  /  AlkPhos  111  07-15    PT/INR - ( 17 Jul 2023 02:11 )   PT: 22.2 sec;   INR: 1.90 ratio         PTT - ( 17 Jul 2023 02:11 )  PTT:34.1 sec  Urinalysis Basic - ( 17 Jul 2023 02:11 )    Color: x / Appearance: x / SG: x / pH: x  Gluc: 197 mg/dL / Ketone: x  / Bili: x / Urobili: x   Blood: x / Protein: x / Nitrite: x   Leuk Esterase: x / RBC: x / WBC x   Sq Epi: x / Non Sq Epi: x / Bacteria: x        RADIOLOGY & ADDITIONAL STUDIES:
Subjective: Patient seen and examined at bedside, no acute complaints, no events overnight. Tolerating a regular diet, voiding and having colostomy function.     STATUS POST:  Ex lap, Hartmanns procedure     POST OPERATIVE DAY #: 10    MEDICATIONS  (STANDING):  acetaminophen     Tablet .. 975 milliGRAM(s) Oral every 6 hours  atorvastatin 40 milliGRAM(s) Oral at bedtime  carBAMazepine 200 milliGRAM(s) Oral two times a day  caspofungin IVPB      caspofungin IVPB 50 milliGRAM(s) IV Intermittent every 24 hours  dextrose 5%. 1000 milliLiter(s) (100 mL/Hr) IV Continuous <Continuous>  dextrose 5%. 1000 milliLiter(s) (50 mL/Hr) IV Continuous <Continuous>  dextrose 50% Injectable 25 Gram(s) IV Push once  dextrose 50% Injectable 12.5 Gram(s) IV Push once  enoxaparin Injectable 40 milliGRAM(s) SubCutaneous every 24 hours  glucagon  Injectable 1 milliGRAM(s) IntraMuscular once  metoprolol tartrate 12.5 milliGRAM(s) Oral every 12 hours  pantoprazole  Injectable 40 milliGRAM(s) IV Push daily  PARoxetine 30 milliGRAM(s) Oral daily  piperacillin/tazobactam IVPB.. 3.375 Gram(s) IV Intermittent every 8 hours  potassium phosphate / sodium phosphate Powder (PHOS-NaK) 1 Packet(s) Oral two times a day  potassium phosphate IVPB 30 milliMole(s) IV Intermittent once    MEDICATIONS  (PRN):  naloxone Injectable 0.1 milliGRAM(s) IV Push every 3 minutes PRN For ANY of the following changes in patient status:  A. RR LESS THAN 10 breaths per minute, B. Oxygen saturation LESS THAN 90%, C. Sedation score of 6  ondansetron Injectable 4 milliGRAM(s) IV Push every 6 hours PRN Nausea    Vital Signs Last 24 Hrs  T(C): 36.4 (25 Jul 2023 09:30), Max: 36.8 (24 Jul 2023 17:14)  T(F): 97.6 (25 Jul 2023 09:30), Max: 98.3 (24 Jul 2023 22:24)  HR: 65 (25 Jul 2023 09:30) (65 - 79)  BP: 121/74 (25 Jul 2023 09:30) (108/69 - 128/69)  BP(mean): --  RR: 18 (25 Jul 2023 04:13) (18 - 18)  SpO2: 95% (25 Jul 2023 09:30) (87% - 95%)  Parameters below as of 25 Jul 2023 09:30  Patient On (Oxygen Delivery Method): room air    Physical Exam:  Constitutional: NAD  HEENT: PERRL, EOMI  Neck: No JVD, FROM without pain  Respiratory: Respirations non-labored, no accessory muscle use  Gastrointestinal: Soft, non-tender, non-distended, wound vac in place with good seal, colostomy functioning   Neurological: A&O x 3; without gross deficit    LABS:                   8.1    14.27 )-----------( 842      ( 25 Jul 2023 06:39 )             26.0   07-25  144  |  108  |  6.4<L>  ----------------------------<  102<H>  3.5   |  27.0  |  0.53  Ca    7.8<L>      25 Jul 2023 06:39  Phos  2.1     07-25  Mg     1.9     07-25    A: Patient is a 79 yo F s/p ex lap, Hartmanns procedure, pod# 10, for perforated diverticulitis, also with candida fungemia, on Zosyn and caspofungin. She is tolerating a regular diet, afebrile, hemodynamically stable, wbc down to 14.4 from 16.5.     Plan:   Continue abx- Zosyn and caspofungin, following ID recs   Pain control prn   Strict I&Os   Home meds restarted   Rep BMP for 6pm, electrolytes repleted   AM labs   Encourage oob ambulation and incentive spirometer   DVT ppx with SCDs and Lovenox                
Subjective: Patient seen at bedside, complaint of a small amount of abdominal pain but overall improved, no overnight events, denies n/v/cp/sob.      MEDICATIONS  (STANDING):  acetaminophen     Tablet .. 975 milliGRAM(s) Oral every 6 hours  atorvastatin 40 milliGRAM(s) Oral at bedtime  carBAMazepine 200 milliGRAM(s) Oral two times a day  clopidogrel Tablet 75 milliGRAM(s) Oral daily  enoxaparin Injectable 40 milliGRAM(s) SubCutaneous every 24 hours  hydrochlorothiazide 12.5 milliGRAM(s) Oral daily  ibuprofen  Tablet. 400 milliGRAM(s) Oral every 6 hours  lisinopril 5 milliGRAM(s) Oral daily  metoprolol succinate ER 25 milliGRAM(s) Oral daily  PARoxetine 30 milliGRAM(s) Oral daily  piperacillin/tazobactam IVPB.. 3.375 Gram(s) IV Intermittent every 8 hours    MEDICATIONS  (PRN):  ondansetron Injectable 4 milliGRAM(s) IV Push every 6 hours PRN Nausea      Vital Signs Last 24 Hrs  T(C): 36.8 (14 Jul 2023 05:00), Max: 36.9 (13 Jul 2023 14:22)  T(F): 98.3 (14 Jul 2023 05:00), Max: 98.5 (13 Jul 2023 14:22)  HR: 68 (14 Jul 2023 05:00) (68 - 86)  BP: 130/72 (14 Jul 2023 05:00) (118/62 - 146/80)  BP(mean): --  RR: 18 (14 Jul 2023 05:00) (18 - 18)  SpO2: 96% (14 Jul 2023 05:00) (94% - 97%)    Parameters below as of 14 Jul 2023 05:00  Patient On (Oxygen Delivery Method): room air        Physical Exam:    Constitutional: NAD  HEENT: PERRL, EOMI  Respiratory: Respirations non-labored, no accessory muscle use  Gastrointestinal: Soft, minimally tender to RLQ, non-distended  Neurological: A&O x 3      LABS:                        10.3   20.61 )-----------( 463      ( 14 Jul 2023 06:25 )             31.7     07-13    138  |  102  |  9.8  ----------------------------<  123<H>  2.9<LL>   |  23.0  |  0.80    Ca    8.5      13 Jul 2023 06:47  Phos  2.7     07-13  Mg     1.7     07-13        Urinalysis Basic - ( 13 Jul 2023 06:47 )    Color: x / Appearance: x / SG: x / pH: x  Gluc: 123 mg/dL / Ketone: x  / Bili: x / Urobili: x   Blood: x / Protein: x / Nitrite: x   Leuk Esterase: x / RBC: x / WBC x   Sq Epi: x / Non Sq Epi: x / Bacteria: x        A: 78 year old M, HTN, HLD, Seizures, CAD with one stent on plavix admitted with perforated diverticulitis with pneumoperitoneum    P:  Monitor diet tolerance  Abx  AM labs  Monitor for fever  Dispo planning with home IV Abx    
                                           [facility]                                                INFECTIOUS DISEASES  =======================================================                               Zay Patterson MD#    Jessica Larose MD*                           Christin Blake MD*   Samantha Gallardo MD*            Diplomates American Board of Internal Medicine & Infectious Diseases                  # [place] Office - Appt - Tel  [phone] Fax [phone]                * [place] Office - Appt - Tel [phone] Fax [phone]                                  Hospital Consult line:  [phone]  =======================================================      [id number]  LINDA BOYLE   follow up for: perforated diverticulitis, ileus  afebrile  feels better today  still npo  wbc uptrending  ngt removed, on clears  patient seen and examined.       I have personally reviewed the labs and data; pertinent labs and data are listed in this note; please see below.   ===================================================  REVIEW OF SYSTEMS:  CONSTITUTIONAL:  No Fever or chills  HEENT:  No diplopia or blurred vision.  No earache, sore throat or runny nose.  CARDIOVASCULAR:  No pressure, squeezing, strangling, tightness, heaviness or aching about the chest, neck, axilla or epigastrium.  RESPIRATORY:  No cough, shortness of breath  GASTROINTESTINAL:  No nausea, vomiting or diarrhea.  GENITOURINARY:  No dysuria, frequency or urgency. No Blood in urine  MUSCULOSKELETAL:  no joint aches, no muscle pain  SKIN:  No change in skin, hair or nails.  NEUROLOGIC:  No Headaches, seizures or weakness.  PSYCHIATRIC:  No disorder of thought or mood.  ENDOCRINE:  No heat or cold intolerance  HEMATOLOGICAL:  No easy bruising or bleeding.    =======================================================  Allergies    No Known Allergies    Intolerances    Antibiotics:  piperacillin/tazobactam IVPB.. 3.375 Gram(s) IV Intermittent every 8 hours    Other medications:  atorvastatin 40 milliGRAM(s) Oral at bedtime  carBAMazepine 200 milliGRAM(s) Oral two times a day  dextrose 5%. 1000 milliLiter(s) IV Continuous <Continuous>  dextrose 5%. 1000 milliLiter(s) IV Continuous <Continuous>  dextrose 50% Injectable 12.5 Gram(s) IV Push once  dextrose 50% Injectable 25 Gram(s) IV Push once  dextrose 50% Injectable 25 Gram(s) IV Push once  enoxaparin Injectable 40 milliGRAM(s) SubCutaneous every 24 hours  furosemide   Injectable 40 milliGRAM(s) IV Push daily  glucagon  Injectable 1 milliGRAM(s) IntraMuscular once  insulin lispro (ADMELOG) corrective regimen sliding scale   SubCutaneous every 6 hours  lactated ringers. 1000 milliLiter(s) IV Continuous <Continuous>  metoprolol tartrate 12.5 milliGRAM(s) Oral every 12 hours  pantoprazole  Injectable 40 milliGRAM(s) IV Push daily  PARoxetine 30 milliGRAM(s) Oral daily    ======================================================  Physical Exam:  ============  Vital Signs Last 24 Hrs  T(C): 36.7 (20 Jul 2023 15:57), Max: 37.1 (20 Jul 2023 00:11)  T(F): 98 (20 Jul 2023 15:57), Max: 98.8 (20 Jul 2023 07:30)  HR: 82 (20 Jul 2023 15:57) (71 - 83)  BP: 96/62 (20 Jul 2023 15:57) (96/62 - 147/78)  BP(mean): --  RR: 18 (20 Jul 2023 15:57) (18 - 18)  SpO2: 93% (20 Jul 2023 15:57) (92% - 97%)    Parameters below as of 20 Jul 2023 15:57  Patient On (Oxygen Delivery Method): room air        General:  No acute distress.   Eye: no conjunctival pallor, no scleral icterus  Neck: Supple, No lymphadenopathy.  Respiratory: Lungs are clear to auscultation, Respirations are non-labored.  Cardiovascular: Normal rate, Regular rhythm,  s1+s2  Gastrointestinal: Soft, Non-tender, ostomy intact, midline packing in place, olivia serosanguinous, Normal bowel sounds.  Lymphatics: No lymphadenopathy neck  Musculoskeletal: Normal range of motion, Normal strength.  Integumentary: + b/l pitting edema  LE  Neurologic: Alert, Oriented, No focal deficits  Psychiatric: Appropriate mood & affect.  =======================================================  Labs:                                       9.5    26.67 )-----------( 584      ( 20 Jul 2023 09:02 )             30.7       07-20    141  |  100  |  10.4  ----------------------------<  90  3.6   |  28.0  |  0.53    Ca    7.8<L>      20 Jul 2023 09:02  Phos  2.5     07-20  Mg     2.1     07-20    TPro  x   /  Alb  2.2<L>  /  TBili  x   /  DBili  x   /  AST  x   /  ALT  x   /  AlkPhos  x   07-19              Urinalysis Basic - ( 20 Jul 2023 09:02 )    Color: x / Appearance: x / SG: x / pH: x  Gluc: 90 mg/dL / Ketone: x  / Bili: x / Urobili: x   Blood: x / Protein: x / Nitrite: x   Leuk Esterase: x / RBC: x / WBC x   Sq Epi: x / Non Sq Epi: x / Bacteria: x                  CAPILLARY BLOOD GLUCOSE      POCT Blood Glucose.: 80 mg/dL (20 Jul 2023 12:22)            Culture - Blood (collected 07-18-23 @ 10:40)  Source: .Blood Blood-Peripheral    Culture - Blood (collected 07-18-23 @ 10:34)  Source: .Blood Blood-Peripheral    Culture - Surgical Swab (collected 07-15-23 @ 10:45)  Source: .Surgical Swab #2 - Abscess of Peritoneum  Final Report (07-19-23 @ 16:23):    Few Klebsiella pneumoniae    Rare Escherichia coli    Rare Candida albicans    Moderate Bacteroides ovatus group  Organism: Klebsiella pneumoniae (07-17-23 @ 18:11)  Organism: Klebsiella pneumoniae (07-17-23 @ 18:11)    Sensitivities:      Method Type: KIYA      -  Amikacin: S <=16      -  Amoxicillin/Clavulanic Acid: S <=8/4      -  Ampicillin: R >16 These ampicillin results predict results for amoxicillin      -  Ampicillin/Sulbactam: S 8/4 Enterobacter, Klebsiella aerogenes, Citrobacter, and Serratia may develop resistance during prolonged therapy (3-4 days)      -  Aztreonam: S <=4      -  Cefazolin: S <=2 Enterobacter, Klebsiella aerogenes, Citrobacter, and Serratia may develop resistance during prolonged therapy (3-4 days)      -  Cefepime: S <=2      -  Cefoxitin: S <=8      -  Ceftriaxone: S <=1 Enterobacter, Klebsiella aerogenes, Citrobacter, and Serratia may develop resistance during prolonged therapy      -  Ciprofloxacin: S <=0.25      -  Ertapenem: S <=0.5      -  Gentamicin: S <=2      -  Imipenem: S <=1      -  Levofloxacin: S <=0.5      -  Meropenem: S <=1      -  Piperacillin/Tazobactam: S <=8      -  Tobramycin: S 4      -  Trimethoprim/Sulfamethoxazole: S <=0.5/9.5    Culture - Surgical Swab (collected 07-15-23 @ 10:45)  Source: .Surgical Swab #1 Abscess of peritoneum  Final Report (07-17-23 @ 18:14):    Culture yields >4 types of aerobic and/or anaerobic bacteria    Call client services within 7 days if further workup is clinically    indicated.    Culture includes    Few Klebsiella pneumoniae    See previous culture [id number]    Culture - Urine (collected 07-09-23 @ 07:13)  Source: Catheterized Catheterized  Final Report (07-10-23 @ 14:09):    <10,000 CFU/mL Normal Urogenital Maddie    Culture - Blood (collected 07-09-23 @ 06:00)  Source: .Blood Blood  Final Report (07-14-23 @ 12:01):    No growth at 5 days    Culture - Blood (collected 07-09-23 @ 06:00)  Source: .Blood Blood  Final Report (07-14-23 @ 12:01):    No growth at 5 days        
Feels good mno pain m  having diarrhea  afebrile   abd slight distention soft nontender   wbc 19  cont nabx   c dif culture   supportive care
Feels ok some LLQ pain   afebrie   abd obese soft mild LLQ tewnderness  stable   NPO IVF abx   supportive care
Feels ok still with diarrhea  afebrikle   abd sof tnontender   wbc up 20  cdif   repeat ct  of abd   in view of rising wbc  pt zonia need to undergo ex lap dependent on ct results   Pt advise dthat surgery may be required including a temp colostomy  and under stood
HPI/Overnight Events:   Patient evaluated at bedside. No acute distress. No acute events overnight.  Tolerating clear liquid diet  Afebrile  Ambulating    Patient seen and examined at bedside this AM. No overnight events. No complaints. Denies fever, chills, nausea, vomiting, chest pain, SOB, dizziness, abd pain or any other concerning symptoms.    Vital Signs Last 24 Hrs  T(C): 36.7 (22 Jul 2023 01:00), Max: 36.8 (21 Jul 2023 22:27)  T(F): 98.1 (22 Jul 2023 01:00), Max: 98.2 (21 Jul 2023 22:27)  HR: 69 (22 Jul 2023 01:00) (68 - 75)  BP: 127/63 (22 Jul 2023 01:00) (120/72 - 156/84)  BP(mean): --  RR: 18 (22 Jul 2023 01:00) (17 - 18)  SpO2: 92% (22 Jul 2023 01:00) (91% - 96%)    Parameters below as of 22 Jul 2023 01:00  Patient On (Oxygen Delivery Method): room air        I&O's Detail    20 Jul 2023 07:01  -  21 Jul 2023 07:00  --------------------------------------------------------  IN:    Lactated Ringers: 630 mL    Oral Fluid: 3200 mL  Total IN: 3830 mL    OUT:    Bulb (mL): 590 mL    Colostomy (mL): 600 mL    Voided (mL): 800 mL  Total OUT: 1990 mL    Total NET: 1840 mL      21 Jul 2023 07:01  -  22 Jul 2023 05:12  --------------------------------------------------------  IN:    Oral Fluid: 360 mL  Total IN: 360 mL    OUT:    Bulb (mL): 115 mL    Colostomy (mL): 125 mL    Voided (mL): 300 mL  Total OUT: 540 mL    Total NET: -180 mL        MEDICATIONS  (STANDING):  acetaminophen     Tablet .. 975 milliGRAM(s) Oral every 6 hours  atorvastatin 40 milliGRAM(s) Oral at bedtime  carBAMazepine 200 milliGRAM(s) Oral two times a day  caspofungin IVPB      caspofungin IVPB 50 milliGRAM(s) IV Intermittent every 24 hours  dextrose 5%. 1000 milliLiter(s) (50 mL/Hr) IV Continuous <Continuous>  dextrose 5%. 1000 milliLiter(s) (100 mL/Hr) IV Continuous <Continuous>  dextrose 50% Injectable 25 Gram(s) IV Push once  dextrose 50% Injectable 12.5 Gram(s) IV Push once  enoxaparin Injectable 40 milliGRAM(s) SubCutaneous every 24 hours  glucagon  Injectable 1 milliGRAM(s) IntraMuscular once  metoprolol tartrate 12.5 milliGRAM(s) Oral every 12 hours  pantoprazole  Injectable 40 milliGRAM(s) IV Push daily  PARoxetine 30 milliGRAM(s) Oral daily  piperacillin/tazobactam IVPB.. 3.375 Gram(s) IV Intermittent every 8 hours    MEDICATIONS  (PRN):  naloxone Injectable 0.1 milliGRAM(s) IV Push every 3 minutes PRN For ANY of the following changes in patient status:  A. RR LESS THAN 10 breaths per minute, B. Oxygen saturation LESS THAN 90%, C. Sedation score of 6  ondansetron Injectable 4 milliGRAM(s) IV Push every 6 hours PRN Nausea  oxyCODONE    IR 5 milliGRAM(s) Oral every 6 hours PRN Moderate Pain (4 - 6)  oxyCODONE    IR 10 milliGRAM(s) Oral every 6 hours PRN Severe Pain (7 - 10)    Physical Exam:  Gen: pt lying in bed, alert, in NAD  Resp: unlabored  CVS: RRR  Abd: soft, NT, ND, ostomy in place functioning well. Wound dressing changed, wound looks clean, no purulent discharge/erythema/ skin necrosis.    Ext: moving all extremities spontaneously  Psych: AOx3    LABS:                        7.8    20.38 )-----------( 586      ( 21 Jul 2023 09:05 )             24.9     07-21    140  |  101  |  6.3<L>  ----------------------------<  108<H>  3.2<L>   |  27.0  |  0.50    Ca    7.5<L>      21 Jul 2023 13:09  Phos  2.2     07-21  Mg     1.8     07-21        Urinalysis Basic - ( 21 Jul 2023 13:09 )    Color: x / Appearance: x / SG: x / pH: x  Gluc: 108 mg/dL / Ketone: x  / Bili: x / Urobili: x   Blood: x / Protein: x / Nitrite: x   Leuk Esterase: x / RBC: x / WBC x   Sq Epi: x / Non Sq Epi: x / Bacteria: x        STUDIES:   EKG, CXR, U/S, CT, MRI     MICRO:   Cultures     Assessment/Plan:   78F with perforated diverticulitis s/p Hartmanns. Had post-op ileus with NGT placement, NGT output decresing-plan to clamp trial today-possible NGT removal. Nausea has resolved. Wound care- dressing changed with wet kerlex and ABD over top. Patient has been started back on Lasix.   Intraop OR cultures growing polymicrobial with klebsiella, e.coli and bacteriodes, also c/albicans  Ambulating with the walker    Plan:   - Start low fiber diet  - Lasix  - monitor ostomy output  - f/u am labs   - continue zosyn and fluconazole and follow up ID recs
Patient seen and examined at bedside. No acute events overnight. Denies abdominal pain, nausea, vomiting, fever, and chills. Ostomy has good output. Wound vac sealed.     Vitals:  Vital Signs Last 24 Hrs  T(C): 36.7 (24 Jul 2023 05:00), Max: 36.8 (23 Jul 2023 09:22)  T(F): 98.1 (24 Jul 2023 05:00), Max: 98.3 (23 Jul 2023 09:22)  HR: 68 (24 Jul 2023 05:00) (66 - 75)  BP: 122/71 (24 Jul 2023 05:00) (105/66 - 130/74)  BP(mean): --  RR: 18 (24 Jul 2023 05:00) (17 - 18)  SpO2: 92% (24 Jul 2023 05:00) (92% - 96%)    Parameters below as of 24 Jul 2023 05:00  Patient On (Oxygen Delivery Method): room air      Labs:  07-23    141  |  104  |  5.1<L>  ----------------------------<  91  3.1<L>   |  26.0  |  0.57    Ca    7.7<L>      23 Jul 2023 06:03  Phos  2.6     07-23  Mg     2.2     07-23                              9.1    17.33 )-----------( 828      ( 23 Jul 2023 06:03 )             29.7     Exam:  Gen: pt lying in bed, alert, in NAD  Resp: unlabored  CVS: RRR  Abd: soft, NT, ND, ostomy functioning, wound vac sealed.   Ext: moving all extremities spontaneously, sensation intact, pulses 2+ 
Subjective:   Patient POD 3 s/p Hartmanns procedure. downgraded from SICU on7/17 patient seen and evaluated at bedside this morning. Nursing reported several episodes of bilious vomiting overnight, Zofran given. pt complaining of nausea this morning, last episode of emesis was overnight, nonbloody. ostomy having good output, air in bag stool in bag. Patient appearing incomfortable. patient denies CP, SOB.     MEDICATIONS  (STANDING):  atorvastatin 40 milliGRAM(s) Oral at bedtime  carBAMazepine 200 milliGRAM(s) Oral two times a day  dextrose 5%. 1000 milliLiter(s) (50 mL/Hr) IV Continuous <Continuous>  dextrose 5%. 1000 milliLiter(s) (100 mL/Hr) IV Continuous <Continuous>  dextrose 50% Injectable 25 Gram(s) IV Push once  dextrose 50% Injectable 25 Gram(s) IV Push once  dextrose 50% Injectable 12.5 Gram(s) IV Push once  enoxaparin Injectable 40 milliGRAM(s) SubCutaneous every 24 hours  furosemide   Injectable 20 milliGRAM(s) IV Push once  glucagon  Injectable 1 milliGRAM(s) IntraMuscular once  insulin lispro (ADMELOG) corrective regimen sliding scale   SubCutaneous every 6 hours  metoprolol tartrate 12.5 milliGRAM(s) Oral every 12 hours  pantoprazole  Injectable 40 milliGRAM(s) IV Push daily  PARoxetine 30 milliGRAM(s) Oral daily  piperacillin/tazobactam IVPB.. 3.375 Gram(s) IV Intermittent every 8 hours  potassium phosphate IVPB 30 milliMole(s) IV Intermittent once    MEDICATIONS  (PRN):  dextrose Oral Gel 15 Gram(s) Oral once PRN Blood Glucose LESS THAN 70 milliGRAM(s)/deciliter  naloxone Injectable 0.1 milliGRAM(s) IV Push every 3 minutes PRN For ANY of the following changes in patient status:  A. RR LESS THAN 10 breaths per minute, B. Oxygen saturation LESS THAN 90%, C. Sedation score of 6  ondansetron Injectable 4 milliGRAM(s) IV Push every 6 hours PRN Nausea  oxyCODONE    IR 5 milliGRAM(s) Oral every 6 hours PRN Moderate Pain (4 - 6)  oxyCODONE    IR 10 milliGRAM(s) Oral every 6 hours PRN Severe Pain (7 - 10)      Vital Signs Last 24 Hrs  T(C): 38.1 (18 Jul 2023 09:33), Max: 38.1 (18 Jul 2023 09:33)  T(F): 100.5 (18 Jul 2023 09:33), Max: 100.5 (18 Jul 2023 09:33)  HR: 102 (18 Jul 2023 09:33) (78 - 107)  BP: 117/78 (18 Jul 2023 09:33) (103/65 - 158/67)  BP(mean): 85 (17 Jul 2023 18:30) (60 - 91)  RR: 18 (18 Jul 2023 09:33) (15 - 22)  SpO2: 93% (18 Jul 2023 09:33) (92% - 100%)    Parameters below as of 18 Jul 2023 09:33  Patient On (Oxygen Delivery Method): room air        Physical Exam:  Constitutional: uncomfortable appearing   HEENT: PERRLA, EOMI, no drainage or redness  Respiratory: Breath Sounds equal & clear to percussion & auscultation, no accessory muscle use  Cardiovascular: Regular rate & rhythm, normal S1, S2; no murmurs, gallops or rubs; no S3, S4  Gastrointestinal: Lower midline wound, clean, Drain with serosanguinous output. Colostomy pink and viable, active output. Belly soft, non-tender, mildly distended.   Extremities: BL lower extremities mildly edematous       LABS:                        10.0   30.72 )-----------( 549      ( 18 Jul 2023 05:00 )             30.1     07-18    141  |  103  |  8.3  ----------------------------<  148<H>  3.7   |  28.0  |  0.57    Ca    8.3<L>      18 Jul 2023 05:00  Phos  2.1     07-18  Mg     2.3     07-18      PT/INR - ( 18 Jul 2023 05:00 )   PT: 20.6 sec;   INR: 1.77 ratio         PTT - ( 17 Jul 2023 12:10 )  PTT:32.0 sec  Urinalysis Basic - ( 18 Jul 2023 05:00 )    Color: x / Appearance: x / SG: x / pH: x  Gluc: 148 mg/dL / Ketone: x  / Bili: x / Urobili: x   Blood: x / Protein: x / Nitrite: x   Leuk Esterase: x / RBC: x / WBC x   Sq Epi: x / Non Sq Epi: x / Bacteria: x        A: 78 year old M, HTN, HLD, Seizures, CAD with one stent on plavix admitted with perforated diverticulitis with pneumoperitoneum, POD 3 from a Imrtha's procedure. Patient complaining of multiple episodes of nausea/ vomiting overnight. NG tube placed, 1500 cc bilious fluid output immediately with some relief from patient. Patient noted to be febrile to 100.5 and tachy 105. Labs pending. B/L lower extremity swelling, venous duplex ordered r/o dvt.     Plan:   -NPO   -Iv fluids  -monitor colostomy output, drain output   -f/u labs   -Lasix given   -NG tube in place, monitor output  -f/u duplex       
Subjective:  Patient seen and examined at bedside this morning. No overnight events. States that she is feeling better having less nausea. NGT output decreasing. No new complaints. Patient denies recent vomiting, CP, SOB, Colostomy functioning well.       MEDICATIONS  (STANDING):  atorvastatin 40 milliGRAM(s) Oral at bedtime  carBAMazepine 200 milliGRAM(s) Oral two times a day  dextrose 5%. 1000 milliLiter(s) (50 mL/Hr) IV Continuous <Continuous>  dextrose 5%. 1000 milliLiter(s) (100 mL/Hr) IV Continuous <Continuous>  dextrose 50% Injectable 25 Gram(s) IV Push once  dextrose 50% Injectable 25 Gram(s) IV Push once  dextrose 50% Injectable 12.5 Gram(s) IV Push once  enoxaparin Injectable 40 milliGRAM(s) SubCutaneous every 24 hours  glucagon  Injectable 1 milliGRAM(s) IntraMuscular once  insulin lispro (ADMELOG) corrective regimen sliding scale   SubCutaneous every 6 hours  lactated ringers. 1000 milliLiter(s) (42 mL/Hr) IV Continuous <Continuous>  metoprolol tartrate 12.5 milliGRAM(s) Oral every 12 hours  pantoprazole  Injectable 40 milliGRAM(s) IV Push daily  PARoxetine 30 milliGRAM(s) Oral daily  piperacillin/tazobactam IVPB.. 3.375 Gram(s) IV Intermittent every 8 hours    MEDICATIONS  (PRN):  dextrose Oral Gel 15 Gram(s) Oral once PRN Blood Glucose LESS THAN 70 milliGRAM(s)/deciliter  naloxone Injectable 0.1 milliGRAM(s) IV Push every 3 minutes PRN For ANY of the following changes in patient status:  A. RR LESS THAN 10 breaths per minute, B. Oxygen saturation LESS THAN 90%, C. Sedation score of 6  ondansetron Injectable 4 milliGRAM(s) IV Push every 6 hours PRN Nausea  oxyCODONE    IR 5 milliGRAM(s) Oral every 6 hours PRN Moderate Pain (4 - 6)  oxyCODONE    IR 10 milliGRAM(s) Oral every 6 hours PRN Severe Pain (7 - 10)      Vital Signs Last 24 Hrs  T(C): 36.5 (20 Jul 2023 04:38), Max: 37.1 (19 Jul 2023 13:35)  T(F): 97.7 (20 Jul 2023 04:38), Max: 98.7 (19 Jul 2023 13:35)  HR: 81 (20 Jul 2023 04:38) (71 - 87)  BP: 145/85 (20 Jul 2023 04:38) (132/74 - 157/81)  BP(mean): --  RR: 18 (20 Jul 2023 04:38) (16 - 18)  SpO2: 92% (20 Jul 2023 04:38) (92% - 98%)    Parameters below as of 20 Jul 2023 04:38  Patient On (Oxygen Delivery Method): room air        Physical Exam:  Gen: pt lying in bed, alert, in NAD  Resp: unlabored  CVS: RRR  Abd: soft, NT, ND, ostomy in place functioning well. Wound dressing changed, wound looks clean, no purulent discharge/erythema/ skin necrosis.    Ext: moving all extremities spontaneously  Psych: AOx3        LABS:                        9.0    23.24 )-----------( 550      ( 19 Jul 2023 04:32 )             28.0     07-19    144  |  105  |  9.6  ----------------------------<  104<H>  3.8   |  30.0<H>  |  0.53    Ca    7.8<L>      19 Jul 2023 04:32  Phos  3.5     07-19  Mg     2.1     07-19    TPro  x   /  Alb  2.2<L>  /  TBili  x   /  DBili  x   /  AST  x   /  ALT  x   /  AlkPhos  x   07-19      Urinalysis Basic - ( 19 Jul 2023 04:32 )    Color: x / Appearance: x / SG: x / pH: x  Gluc: 104 mg/dL / Ketone: x  / Bili: x / Urobili: x   Blood: x / Protein: x / Nitrite: x   Leuk Esterase: x / RBC: x / WBC x   Sq Epi: x / Non Sq Epi: x / Bacteria: x        A: 78F with perforated diverticulitis s/p Hartmanns. Had post-op ileus with NGT placement, NGT output decresing-plan to clamp trial today-possible NGT removal. Nausea has resolved. Wound care- dressing changed with wet kerlex and ABD over top. Patient has been started back on Lasix.     Plan:   -NPO IVF   -NGT clamp trial today-possible removal   -daily wet to dry dressing shahram, jennifer today  -Lasix  -monitor ostomy output  -f/u am labs 
feelsgreat no issues   afebrile   abd benign   olivia serous   WBC 14  surgically stable   supportive care 
                                           [facility]                                                INFECTIOUS DISEASES  =======================================================                               Zay Patterson MD#    Jessica Larose MD*                           Christin Blake MD*   Samantha Gallardo MD*            Diplomates American Board of Internal Medicine & Infectious Diseases                  # [place] Office - Appt - Tel  [phone] Fax [phone]                * [place] Office - Appt - Tel [phone] Fax [phone]                                  Hospital Consult line:  [phone]  =======================================================      [id number]  LINDA BOYLE   follow up for: perforated diverticulitis  afebrile  tolerating diet   denies pain  wbc14  patient seen and examined.       I have personally reviewed the labs and data; pertinent labs and data are listed in this note; please see below.   ===================================================  REVIEW OF SYSTEMS:  CONSTITUTIONAL:  No Fever or chills  HEENT:  No diplopia or blurred vision.  No earache, sore throat or runny nose.  CARDIOVASCULAR:  No pressure, squeezing, strangling, tightness, heaviness or aching about the chest, neck, axilla or epigastrium.  RESPIRATORY:  No cough, shortness of breath  GASTROINTESTINAL:  No nausea, vomiting or diarrhea.  GENITOURINARY:  No dysuria, frequency or urgency. No Blood in urine  MUSCULOSKELETAL:  no joint aches, no muscle pain  SKIN:  No change in skin, hair or nails.  NEUROLOGIC:  No Headaches, seizures or weakness.  PSYCHIATRIC:  No disorder of thought or mood.  ENDOCRINE:  No heat or cold intolerance  HEMATOLOGICAL:  No easy bruising or bleeding.    =======================================================  Allergies    No Known Allergies    Intolerances    Antibiotics:  caspofungin IVPB      caspofungin IVPB 50 milliGRAM(s) IV Intermittent every 24 hours  piperacillin/tazobactam IVPB.. 3.375 Gram(s) IV Intermittent every 8 hours    Other medications:  acetaminophen     Tablet .. 975 milliGRAM(s) Oral every 6 hours  atorvastatin 40 milliGRAM(s) Oral at bedtime  carBAMazepine 200 milliGRAM(s) Oral two times a day  dextrose 5%. 1000 milliLiter(s) IV Continuous <Continuous>  dextrose 5%. 1000 milliLiter(s) IV Continuous <Continuous>  dextrose 50% Injectable 12.5 Gram(s) IV Push once  dextrose 50% Injectable 25 Gram(s) IV Push once  enoxaparin Injectable 40 milliGRAM(s) SubCutaneous every 24 hours  glucagon  Injectable 1 milliGRAM(s) IntraMuscular once  metoprolol tartrate 12.5 milliGRAM(s) Oral every 12 hours  pantoprazole  Injectable 40 milliGRAM(s) IV Push daily  PARoxetine 30 milliGRAM(s) Oral daily    ======================================================  Physical Exam:  ============  Vital Signs Last 24 Hrs  T(C): 36.4 (25 Jul 2023 09:30), Max: 37 (24 Jul 2023 13:47)  T(F): 97.6 (25 Jul 2023 09:30), Max: 98.6 (24 Jul 2023 13:47)  HR: 65 (25 Jul 2023 09:30) (65 - 79)  BP: 121/74 (25 Jul 2023 09:30) (108/69 - 128/69)  BP(mean): --  RR: 18 (25 Jul 2023 04:13) (17 - 18)  SpO2: 95% (25 Jul 2023 09:30) (87% - 98%)    Parameters below as of 25 Jul 2023 09:30  Patient On (Oxygen Delivery Method): room air            General:  No acute distress. sitting in chair  Eye: no conjunctival pallor, no scleral icterus  Neck: Supple, No lymphadenopathy.  Respiratory: Lungs are clear to auscultation, Respirations are non-labored.  Cardiovascular: Normal rate, Regular rhythm,  s1+s2  Gastrointestinal: Soft, Non-tender, Non-distended, Normal bowel sounds. vac midline, rlq olivia serosanguinous and llq ostomy  Genitourinary: No costovertebral angle tenderness.  Lymphatics: No lymphadenopathy neck  Integumentary: No rash.b/l le pitting edema  Neurologic: Alert, Oriented, No focal deficits  Psychiatric: Appropriate mood & affect.  =======================================================  Labs:                                                    8.1    14.27 )-----------( 842      ( 25 Jul 2023 06:39 )             26.0       07-25    144  |  108  |  6.4<L>  ----------------------------<  102<H>  3.5   |  27.0  |  0.53    Ca    7.8<L>      25 Jul 2023 06:39  Phos  2.1     07-25  Mg     1.9     07-25                Urinalysis Basic - ( 25 Jul 2023 06:39 )    Color: x / Appearance: x / SG: x / pH: x  Gluc: 102 mg/dL / Ketone: x  / Bili: x / Urobili: x   Blood: x / Protein: x / Nitrite: x   Leuk Esterase: x / RBC: x / WBC x   Sq Epi: x / Non Sq Epi: x / Bacteria: x                  CAPILLARY BLOOD GLUCOSE                    Urinalysis Basic - ( 24 Jul 2023 07:34 )    Color: x / Appearance: x / SG: x / pH: x  Gluc: 92 mg/dL / Ketone: x  / Bili: x / Urobili: x   Blood: x / Protein: x / Nitrite: x   Leuk Esterase: x / RBC: x / WBC x   Sq Epi: x / Non Sq Epi: x / Bacteria: x                  CAPILLARY BLOOD GLUCOSE                    Culture - Blood (collected 07-18-23 @ 10:40)  Source: .Blood Blood-Peripheral    Culture - Blood (collected 07-18-23 @ 10:34)  Source: .Blood Blood-Peripheral    Culture - Surgical Swab (collected 07-15-23 @ 10:45)  Source: .Surgical Swab #2 - Abscess of Peritoneum  Final Report (07-19-23 @ 16:23):    Few Klebsiella pneumoniae    Rare Escherichia coli    Rare Candida albicans    Moderate Bacteroides ovatus group  Organism: Klebsiella pneumoniae  Escherichia coli (07-20-23 @ 13:00)  Organism: Escherichia coli (07-20-23 @ 13:00)    Sensitivities:      Method Type: KIYA      -  Amikacin: S <=16      -  Amoxicillin/Clavulanic Acid: S <=8/4      -  Ampicillin: S <=8 These ampicillin results predict results for amoxicillin      -  Ampicillin/Sulbactam: S <=4/2 Enterobacter, Klebsiella aerogenes, Citrobacter, and Serratia may develop resistance during prolonged therapy (3-4 days)      -  Aztreonam: S <=4      -  Cefazolin: S <=2 Enterobacter, Klebsiella aerogenes, Citrobacter, and Serratia may develop resistance during prolonged therapy (3-4 days)      -  Cefepime: S <=2      -  Cefoxitin: S <=8      -  Ceftriaxone: S <=1 Enterobacter, Klebsiella aerogenes, Citrobacter, and Serratia may develop resistance during prolonged therapy      -  Ciprofloxacin: R >2      -  Ertapenem: S <=0.5      -  Gentamicin: S <=2      -  Imipenem: S <=1      -  Levofloxacin: R >4      -  Meropenem: S <=1      -  Piperacillin/Tazobactam: S <=8      -  Tobramycin: S 4      -  Trimethoprim/Sulfamethoxazole: S <=0.5/9.5  Organism: Klebsiella pneumoniae (07-17-23 @ 18:11)    Sensitivities:      Method Type: KIYA      -  Amikacin: S <=16      -  Amoxicillin/Clavulanic Acid: S <=8/4      -  Ampicillin: R >16 These ampicillin results predict results for amoxicillin      -  Ampicillin/Sulbactam: S 8/4 Enterobacter, Klebsiella aerogenes, Citrobacter, and Serratia may develop resistance during prolonged therapy (3-4 days)      -  Aztreonam: S <=4      -  Cefazolin: S <=2 Enterobacter, Klebsiella aerogenes, Citrobacter, and Serratia may develop resistance during prolonged therapy (3-4 days)      -  Cefepime: S <=2      -  Cefoxitin: S <=8      -  Ceftriaxone: S <=1 Enterobacter, Klebsiella aerogenes, Citrobacter, and Serratia may develop resistance during prolonged therapy      -  Ciprofloxacin: S <=0.25      -  Ertapenem: S <=0.5      -  Gentamicin: S <=2      -  Imipenem: S <=1      -  Levofloxacin: S <=0.5      -  Meropenem: S <=1      -  Piperacillin/Tazobactam: S <=8      -  Tobramycin: S 4      -  Trimethoprim/Sulfamethoxazole: S <=0.5/9.5    Culture - Surgical Swab (collected 07-15-23 @ 10:45)  Source: .Surgical Swab #1 Abscess of peritoneum  Final Report (07-17-23 @ 18:14):    Culture yields >4 types of aerobic and/or anaerobic bacteria    Call client services within 7 days if further workup is clinically    indicated.    Culture includes    Few Klebsiella pneumoniae    See previous culture [id number]    Culture - Urine (collected 07-09-23 @ 07:13)  Source: Catheterized Catheterized  Final Report (07-10-23 @ 14:09):    <10,000 CFU/mL Normal Urogenital Maddie    Culture - Blood (collected 07-09-23 @ 06:00)  Source: .Blood Blood  Final Report (07-14-23 @ 12:01):    No growth at 5 days    Culture - Blood (collected 07-09-23 @ 06:00)  Source: .Blood Blood  Final Report (07-14-23 @ 12:01):    No growth at 5 days    < from: US Transvaginal (07.21.23 @ 20:34) >  IMPRESSION:  19 mm thickened and heterogeneous endometrium with cystic spaces. Direct   tissue sampling is needed to differentiate hyperplasia from carcinoma.  5.7 x 3.0 x 4.8 cm right adnexal heterogeneous collection, likely   postoperative hematoma. Infected collection cannot be excluded without   fluid analysis.      --- End of Report ---    < end of copied text >      
                                           [facility]                                                INFECTIOUS DISEASES  =======================================================                               Zay Patterson MD#    Jessica Larose MD*                           Christin Blake MD*   Samantha Gallardo MD*            Diplomates American Board of Internal Medicine & Infectious Diseases                  # [place] Office - Appt - Tel  [phone] Fax [phone]                * [place] Office - Appt - Tel [phone] Fax [phone]                                  Hospital Consult line:  [phone]  =======================================================      [id number]  LINDA BOYLE   follow up for: perforated diverticulitis  vac placed  tolerating diet but reports poor appetite  denies pain  wbc 16  patient seen and examined.       I have personally reviewed the labs and data; pertinent labs and data are listed in this note; please see below.   ===================================================  REVIEW OF SYSTEMS:  CONSTITUTIONAL:  No Fever or chills  HEENT:  No diplopia or blurred vision.  No earache, sore throat or runny nose.  CARDIOVASCULAR:  No pressure, squeezing, strangling, tightness, heaviness or aching about the chest, neck, axilla or epigastrium.  RESPIRATORY:  No cough, shortness of breath  GASTROINTESTINAL:  No nausea, vomiting or diarrhea.  GENITOURINARY:  No dysuria, frequency or urgency. No Blood in urine  MUSCULOSKELETAL:  no joint aches, no muscle pain  SKIN:  No change in skin, hair or nails.  NEUROLOGIC:  No Headaches, seizures or weakness.  PSYCHIATRIC:  No disorder of thought or mood.  ENDOCRINE:  No heat or cold intolerance  HEMATOLOGICAL:  No easy bruising or bleeding.    =======================================================  Allergies    No Known Allergies    Intolerances    Antibiotics:  caspofungin IVPB      caspofungin IVPB 50 milliGRAM(s) IV Intermittent every 24 hours  piperacillin/tazobactam IVPB.. 3.375 Gram(s) IV Intermittent every 8 hours    Other medications:  acetaminophen     Tablet .. 975 milliGRAM(s) Oral every 6 hours  atorvastatin 40 milliGRAM(s) Oral at bedtime  carBAMazepine 200 milliGRAM(s) Oral two times a day  dextrose 5%. 1000 milliLiter(s) IV Continuous <Continuous>  dextrose 5%. 1000 milliLiter(s) IV Continuous <Continuous>  dextrose 50% Injectable 12.5 Gram(s) IV Push once  dextrose 50% Injectable 25 Gram(s) IV Push once  enoxaparin Injectable 40 milliGRAM(s) SubCutaneous every 24 hours  glucagon  Injectable 1 milliGRAM(s) IntraMuscular once  metoprolol tartrate 12.5 milliGRAM(s) Oral every 12 hours  pantoprazole  Injectable 40 milliGRAM(s) IV Push daily  PARoxetine 30 milliGRAM(s) Oral daily    ======================================================  Physical Exam:  ============  Vital Signs Last 24 Hrs  T(C): 36.7 (24 Jul 2023 18:00), Max: 37 (24 Jul 2023 09:27)  T(F): 98 (24 Jul 2023 18:00), Max: 98.6 (24 Jul 2023 09:27)  HR: 79 (24 Jul 2023 18:00) (64 - 79)  BP: 123/73 (24 Jul 2023 18:00) (120/73 - 139/77)  BP(mean): --  RR: 18 (24 Jul 2023 18:00) (17 - 18)  SpO2: 87% (24 Jul 2023 18:00) (87% - 98%)    Parameters below as of 24 Jul 2023 18:00  Patient On (Oxygen Delivery Method): room air        General:  No acute distress.  Eye: no conjunctival pallor, no scleral icterus  Neck: Supple, No lymphadenopathy.  Respiratory: Lungs are clear to auscultation, Respirations are non-labored.  Cardiovascular: Normal rate, Regular rhythm,  s1+s2  Gastrointestinal: Soft, Non-tender, Non-distended, Normal bowel sounds. vac midline, rlq olivia serosanguinous and llq ostomy  Genitourinary: No costovertebral angle tenderness.  Lymphatics: No lymphadenopathy neck  Integumentary: No rash.b/l le pitting edema  Neurologic: Alert, Oriented, No focal deficits  Psychiatric: Appropriate mood & affect.  =======================================================  Labs:                                    8.8    16.45 )-----------( 891      ( 24 Jul 2023 07:34 )             28.5       07-24    141  |  105  |  6.6<L>  ----------------------------<  92  3.3<L>   |  26.0  |  0.48<L>    Ca    7.8<L>      24 Jul 2023 07:34  Phos  2.3     07-24  Mg     2.0     07-24                Urinalysis Basic - ( 24 Jul 2023 07:34 )    Color: x / Appearance: x / SG: x / pH: x  Gluc: 92 mg/dL / Ketone: x  / Bili: x / Urobili: x   Blood: x / Protein: x / Nitrite: x   Leuk Esterase: x / RBC: x / WBC x   Sq Epi: x / Non Sq Epi: x / Bacteria: x                  CAPILLARY BLOOD GLUCOSE                    Culture - Blood (collected 07-18-23 @ 10:40)  Source: .Blood Blood-Peripheral    Culture - Blood (collected 07-18-23 @ 10:34)  Source: .Blood Blood-Peripheral    Culture - Surgical Swab (collected 07-15-23 @ 10:45)  Source: .Surgical Swab #2 - Abscess of Peritoneum  Final Report (07-19-23 @ 16:23):    Few Klebsiella pneumoniae    Rare Escherichia coli    Rare Candida albicans    Moderate Bacteroides ovatus group  Organism: Klebsiella pneumoniae  Escherichia coli (07-20-23 @ 13:00)  Organism: Escherichia coli (07-20-23 @ 13:00)    Sensitivities:      Method Type: KIYA      -  Amikacin: S <=16      -  Amoxicillin/Clavulanic Acid: S <=8/4      -  Ampicillin: S <=8 These ampicillin results predict results for amoxicillin      -  Ampicillin/Sulbactam: S <=4/2 Enterobacter, Klebsiella aerogenes, Citrobacter, and Serratia may develop resistance during prolonged therapy (3-4 days)      -  Aztreonam: S <=4      -  Cefazolin: S <=2 Enterobacter, Klebsiella aerogenes, Citrobacter, and Serratia may develop resistance during prolonged therapy (3-4 days)      -  Cefepime: S <=2      -  Cefoxitin: S <=8      -  Ceftriaxone: S <=1 Enterobacter, Klebsiella aerogenes, Citrobacter, and Serratia may develop resistance during prolonged therapy      -  Ciprofloxacin: R >2      -  Ertapenem: S <=0.5      -  Gentamicin: S <=2      -  Imipenem: S <=1      -  Levofloxacin: R >4      -  Meropenem: S <=1      -  Piperacillin/Tazobactam: S <=8      -  Tobramycin: S 4      -  Trimethoprim/Sulfamethoxazole: S <=0.5/9.5  Organism: Klebsiella pneumoniae (07-17-23 @ 18:11)    Sensitivities:      Method Type: KIYA      -  Amikacin: S <=16      -  Amoxicillin/Clavulanic Acid: S <=8/4      -  Ampicillin: R >16 These ampicillin results predict results for amoxicillin      -  Ampicillin/Sulbactam: S 8/4 Enterobacter, Klebsiella aerogenes, Citrobacter, and Serratia may develop resistance during prolonged therapy (3-4 days)      -  Aztreonam: S <=4      -  Cefazolin: S <=2 Enterobacter, Klebsiella aerogenes, Citrobacter, and Serratia may develop resistance during prolonged therapy (3-4 days)      -  Cefepime: S <=2      -  Cefoxitin: S <=8      -  Ceftriaxone: S <=1 Enterobacter, Klebsiella aerogenes, Citrobacter, and Serratia may develop resistance during prolonged therapy      -  Ciprofloxacin: S <=0.25      -  Ertapenem: S <=0.5      -  Gentamicin: S <=2      -  Imipenem: S <=1      -  Levofloxacin: S <=0.5      -  Meropenem: S <=1      -  Piperacillin/Tazobactam: S <=8      -  Tobramycin: S 4      -  Trimethoprim/Sulfamethoxazole: S <=0.5/9.5    Culture - Surgical Swab (collected 07-15-23 @ 10:45)  Source: .Surgical Swab #1 Abscess of peritoneum  Final Report (07-17-23 @ 18:14):    Culture yields >4 types of aerobic and/or anaerobic bacteria    Call client services within 7 days if further workup is clinically    indicated.    Culture includes    Few Klebsiella pneumoniae    See previous culture [id number]    Culture - Urine (collected 07-09-23 @ 07:13)  Source: Catheterized Catheterized  Final Report (07-10-23 @ 14:09):    <10,000 CFU/mL Normal Urogenital Maddie    Culture - Blood (collected 07-09-23 @ 06:00)  Source: .Blood Blood  Final Report (07-14-23 @ 12:01):    No growth at 5 days    Culture - Blood (collected 07-09-23 @ 06:00)  Source: .Blood Blood  Final Report (07-14-23 @ 12:01):    No growth at 5 days    < from: US Transvaginal (07.21.23 @ 20:34) >  IMPRESSION:  19 mm thickened and heterogeneous endometrium with cystic spaces. Direct   tissue sampling is needed to differentiate hyperplasia from carcinoma.  5.7 x 3.0 x 4.8 cm right adnexal heterogeneous collection, likely   postoperative hematoma. Infected collection cannot be excluded without   fluid analysis.      --- End of Report ---    < end of copied text >      
                                           [facility]                                                INFECTIOUS DISEASES  =======================================================                               Zay Patterson MD#    Jessica Larose MD*                           Christin Blake MD*   Samantha Gallardo MD*            Diplomates American Board of Internal Medicine & Infectious Diseases                  # [place] Office - Appt - Tel  [phone] Fax [phone]                * [place] Office - Appt - Tel [phone] Fax [phone]                                  Hospital Consult line:  [phone]  =======================================================      [id number]  LINDA BOYLE   follow up for: perforated diverticulitis     pt with bilious vomiting overnight, wbc uptrending, low grade fever this am  sump tube in place  feels better after sump placed    patient seen and examined.       I have personally reviewed the labs and data; pertinent labs and data are listed in this note; please see below.   ===================================================  REVIEW OF SYSTEMS:  CONSTITUTIONAL:+fever  HEENT:  No diplopia or blurred vision.  No earache, sore throat or runny nose.  CARDIOVASCULAR:  No pressure, squeezing, strangling, tightness, heaviness or aching about the chest, neck, axilla or epigastrium.  RESPIRATORY:  No cough, shortness of breath  GASTROINTESTINAL:  No nausea, +vomiting no diarrhea.  GENITOURINARY:  No dysuria, frequency or urgency. No Blood in urine  MUSCULOSKELETAL:  no joint aches, no muscle pain  SKIN:  No change in skin, hair or nails.  NEUROLOGIC:  No Headaches, seizures or weakness.  PSYCHIATRIC:  No disorder of thought or mood.  ENDOCRINE:  No heat or cold intolerance  HEMATOLOGICAL:  No easy bruising or bleeding.    =======================================================  Allergies    No Known Allergies    Intolerances    Antibiotics:  piperacillin/tazobactam IVPB.. 3.375 Gram(s) IV Intermittent every 8 hours    Other medications:  atorvastatin 40 milliGRAM(s) Oral at bedtime  carBAMazepine 200 milliGRAM(s) Oral two times a day  dextrose 5%. 1000 milliLiter(s) IV Continuous <Continuous>  dextrose 5%. 1000 milliLiter(s) IV Continuous <Continuous>  dextrose 50% Injectable 25 Gram(s) IV Push once  dextrose 50% Injectable 25 Gram(s) IV Push once  dextrose 50% Injectable 12.5 Gram(s) IV Push once  enoxaparin Injectable 40 milliGRAM(s) SubCutaneous every 24 hours  glucagon  Injectable 1 milliGRAM(s) IntraMuscular once  insulin lispro (ADMELOG) corrective regimen sliding scale   SubCutaneous every 6 hours  metoprolol tartrate 12.5 milliGRAM(s) Oral every 12 hours  PARoxetine 30 milliGRAM(s) Oral daily  potassium chloride   Powder 40 milliEquivalent(s) Oral once    ======================================================  Physical Exam:  ============  Vital Signs Last 24 Hrs  T(C): 36.9 (18 Jul 2023 16:09), Max: 38.1 (18 Jul 2023 09:33)  T(F): 98.5 (18 Jul 2023 16:09), Max: 100.5 (18 Jul 2023 09:33)  HR: 91 (18 Jul 2023 16:09) (81 - 107)  BP: 164/68 (18 Jul 2023 16:09) (117/78 - 164/68)  BP(mean): 85 (17 Jul 2023 18:30) (85 - 87)  RR: 18 (18 Jul 2023 16:09) (15 - 18)  SpO2: 91% (18 Jul 2023 16:09) (91% - 96%)    Parameters below as of 18 Jul 2023 16:09  Patient On (Oxygen Delivery Method): room air        General:  No acute distress. sump in place, bilious output  Eye: no conjunctival pallor, no scleral icterus  Neck: Supple, No lymphadenopathy.  Respiratory: Lungs are clear to auscultation, Respirations are non-labored.  Cardiovascular: Normal rate, Regular rhythm,  s1+s2  Gastrointestinal: Soft, Non-tender, Non-distended, Normal bowel sounds. abdominal wound with packing in place, ostomy intact, olivia serosanguinous  Genitourinary: No costovertebral angle tenderness.  Integumentary: b/l le edema  Neurologic: Alert, Oriented, No focal deficits  Psychiatric: Appropriate mood & affect.  =======================================================  Labs:                                         9.8    28.96 )-----------( 536      ( 18 Jul 2023 10:34 )             28.4       07-18    139  |  101  |  8.8  ----------------------------<  141<H>  3.7   |  27.0  |  0.61    Ca    7.9<L>      18 Jul 2023 10:34  Phos  2.1     07-18  Mg     2.2     07-18                Urinalysis Basic - ( 18 Jul 2023 10:34 )    Color: x / Appearance: x / SG: x / pH: x  Gluc: 141 mg/dL / Ketone: x  / Bili: x / Urobili: x   Blood: x / Protein: x / Nitrite: x   Leuk Esterase: x / RBC: x / WBC x   Sq Epi: x / Non Sq Epi: x / Bacteria: x        PT/INR - ( 18 Jul 2023 05:00 )   PT: 20.6 sec;   INR: 1.77 ratio         PTT - ( 17 Jul 2023 12:10 )  PTT:32.0 sec          CAPILLARY BLOOD GLUCOSE      POCT Blood Glucose.: 117 mg/dL (18 Jul 2023 11:57)              Culture - Surgical Swab (collected 07-15-23 @ 10:45)  Source: .Surgical Swab #2 - Abscess of Peritoneum  Final Report (07-17-23 @ 18:11):    Culture yields >4 types of aerobic and/or anaerobic bacteria    Call client services within 7 days if further workup is clinically    indicated.    including    Few Klebsiella pneumoniae  Organism: Klebsiella pneumoniae (07-17-23 @ 18:11)  Organism: Klebsiella pneumoniae (07-17-23 @ 18:11)    Sensitivities:      Method Type: KIYA      -  Amikacin: S <=16      -  Amoxicillin/Clavulanic Acid: S <=8/4      -  Ampicillin: R >16 These ampicillin results predict results for amoxicillin      -  Ampicillin/Sulbactam: S 8/4 Enterobacter, Klebsiella aerogenes, Citrobacter, and Serratia may develop resistance during prolonged therapy (3-4 days)      -  Aztreonam: S <=4      -  Cefazolin: S <=2 Enterobacter, Klebsiella aerogenes, Citrobacter, and Serratia may develop resistance during prolonged therapy (3-4 days)      -  Cefepime: S <=2      -  Cefoxitin: S <=8      -  Ceftriaxone: S <=1 Enterobacter, Klebsiella aerogenes, Citrobacter, and Serratia may develop resistance during prolonged therapy      -  Ciprofloxacin: S <=0.25      -  Ertapenem: S <=0.5      -  Gentamicin: S <=2      -  Imipenem: S <=1      -  Levofloxacin: S <=0.5      -  Meropenem: S <=1      -  Piperacillin/Tazobactam: S <=8      -  Tobramycin: S 4      -  Trimethoprim/Sulfamethoxazole: S <=0.5/9.5    Culture - Surgical Swab (collected 07-15-23 @ 10:45)  Source: .Surgical Swab #1 Abscess of peritoneum  Final Report (07-17-23 @ 18:14):    Culture yields >4 types of aerobic and/or anaerobic bacteria    Call client services within 7 days if further workup is clinically    indicated.    Culture includes    Few Klebsiella pneumoniae    See previous culture [id number]    Culture - Urine (collected 07-09-23 @ 07:13)  Source: Catheterized Catheterized  Final Report (07-10-23 @ 14:09):    <10,000 CFU/mL Normal Urogenital Maddie    Culture - Blood (collected 07-09-23 @ 06:00)  Source: .Blood Blood  Final Report (07-14-23 @ 12:01):    No growth at 5 days    Culture - Blood (collected 07-09-23 @ 06:00)  Source: .Blood Blood  Final Report (07-14-23 @ 12:01):    No growth at 5 days      < from: CT Abdomen and Pelvis w/ Oral Cont and w/ IV Cont (07.14.23 @ 14:51) >  IMPRESSION:  Redemonstrated severe sigmoid diverticulitis with focal perforation and   developing perisigmoid abscess which is not yet completely organized.    Previously seen remote free air has improved.    Left adrenal nodule larger than in 2018. Recommend MRI for   characterization.    Findings were discussed with Dr. COLLINS MOSS [phone] 7/14/2023 5:05   PM by Dr. Ligia Amador with read back confirmation.    ---End of Report ---    < end of copied text >    
                                           [facility]                                                INFECTIOUS DISEASES  =======================================================                               Zay Patterson MD#    Jessica Larose MD*                           Christin Blake MD*   Samantha Gallardo MD*            Diplomates American Board of Internal Medicine & Infectious Diseases                  # [place] Office - Appt - Tel  [phone] Fax [phone]                * [place] Office - Appt - Tel [phone] Fax [phone]                                  Hospital Consult line:  [phone]  =======================================================      [id number]  LINDA BOYLE   follow up for: perforated diverticulitis, ileus  afebrile  feels better today  still npo  wbc better  c/o le swelling  patient seen and examined.       I have personally reviewed the labs and data; pertinent labs and data are listed in this note; please see below.   ===================================================  REVIEW OF SYSTEMS:  CONSTITUTIONAL:  No Fever or chills  HEENT:  No diplopia or blurred vision.  No earache, sore throat or runny nose.  CARDIOVASCULAR:  No pressure, squeezing, strangling, tightness, heaviness or aching about the chest, neck, axilla or epigastrium.  RESPIRATORY:  No cough, shortness of breath  GASTROINTESTINAL:  No nausea, vomiting or diarrhea.  GENITOURINARY:  No dysuria, frequency or urgency. No Blood in urine  MUSCULOSKELETAL:  no joint aches, no muscle pain  SKIN:  No change in skin, hair or nails.  NEUROLOGIC:  No Headaches, seizures or weakness.  PSYCHIATRIC:  No disorder of thought or mood.  ENDOCRINE:  No heat or cold intolerance  HEMATOLOGICAL:  No easy bruising or bleeding.    =======================================================  Allergies    No Known Allergies    Intolerances    Antibiotics:  piperacillin/tazobactam IVPB.. 3.375 Gram(s) IV Intermittent every 8 hours    Other medications:  atorvastatin 40 milliGRAM(s) Oral at bedtime  carBAMazepine 200 milliGRAM(s) Oral two times a day  dextrose 5%. 1000 milliLiter(s) IV Continuous <Continuous>  dextrose 5%. 1000 milliLiter(s) IV Continuous <Continuous>  dextrose 50% Injectable 12.5 Gram(s) IV Push once  dextrose 50% Injectable 25 Gram(s) IV Push once  dextrose 50% Injectable 25 Gram(s) IV Push once  enoxaparin Injectable 40 milliGRAM(s) SubCutaneous every 24 hours  furosemide   Injectable 40 milliGRAM(s) IV Push daily  glucagon  Injectable 1 milliGRAM(s) IntraMuscular once  insulin lispro (ADMELOG) corrective regimen sliding scale   SubCutaneous every 6 hours  lactated ringers. 1000 milliLiter(s) IV Continuous <Continuous>  metoprolol tartrate 12.5 milliGRAM(s) Oral every 12 hours  pantoprazole  Injectable 40 milliGRAM(s) IV Push daily  PARoxetine 30 milliGRAM(s) Oral daily    ======================================================  Physical Exam:  ============  T(F): 98 (19 Jul 2023 15:43), Max: 98.9 (19 Jul 2023 00:00)  HR: 83 (19 Jul 2023 17:20)  BP: 142/70 (19 Jul 2023 17:20)  RR: 18 (19 Jul 2023 15:43)  SpO2: 95% (19 Jul 2023 15:43) (91% - 98%)  temp max in last 48H T(F): , Max: 100.5 (07-18-23 @ 09:33)    General:  No acute distress. sump bilious output  Eye: no conjunctival pallor, no scleral icterus  Neck: Supple, No lymphadenopathy.  Respiratory: Lungs are clear to auscultation, Respirations are non-labored.  Cardiovascular: Normal rate, Regular rhythm,  s1+s2  Gastrointestinal: Soft, Non-tender, ostomy intact, midline packing in place, olivia serosanguinous, Normal bowel sounds.  Lymphatics: No lymphadenopathy neck  Musculoskeletal: Normal range of motion, Normal strength.  Integumentary: + b/l pitting edema  LE  Neurologic: Alert, Oriented, No focal deficits  Psychiatric: Appropriate mood & affect.  =======================================================  Labs:                        9.0    23.24 )-----------( 550      ( 19 Jul 2023 04:32 )             28.0     07-19    144  |  105  |  9.6  ----------------------------<  104<H>  3.8   |  30.0<H>  |  0.53    Ca    7.8<L>      19 Jul 2023 04:32  Phos  3.5     07-19  Mg     2.1     07-19    TPro  x   /  Alb  2.2<L>  /  TBili  x   /  DBili  x   /  AST  x   /  ALT  x   /  AlkPhos  x   07-19      Culture - Blood (collected 07-18-23 @ 10:40)  Source: .Blood Blood-Peripheral    Culture - Blood (collected 07-18-23 @ 10:34)  Source: .Blood Blood-Peripheral    Culture - Surgical Swab (collected 07-15-23 @ 10:45)  Source: .Surgical Swab #2 - Abscess of Peritoneum  Final Report (07-19-23 @ 16:23):    Few Klebsiella pneumoniae    Rare Escherichia coli    Rare Candida albicans    Moderate Bacteroides ovatus group  Organism: Klebsiella pneumoniae (07-17-23 @ 18:11)  Organism: Klebsiella pneumoniae (07-17-23 @ 18:11)    Sensitivities:      Method Type: KIYA      -  Amikacin: S <=16      -  Amoxicillin/Clavulanic Acid: S <=8/4      -  Ampicillin: R >16 These ampicillin results predict results for amoxicillin      -  Ampicillin/Sulbactam: S 8/4 Enterobacter, Klebsiella aerogenes, Citrobacter, and Serratia may develop resistance during prolonged therapy (3-4 days)      -  Aztreonam: S <=4      -  Cefazolin: S <=2 Enterobacter, Klebsiella aerogenes, Citrobacter, and Serratia may develop resistance during prolonged therapy (3-4 days)      -  Cefepime: S <=2      -  Cefoxitin: S <=8      -  Ceftriaxone: S <=1 Enterobacter, Klebsiella aerogenes, Citrobacter, and Serratia may develop resistance during prolonged therapy      -  Ciprofloxacin: S <=0.25      -  Ertapenem: S <=0.5      -  Gentamicin: S <=2      -  Imipenem: S <=1      -  Levofloxacin: S <=0.5      -  Meropenem: S <=1      -  Piperacillin/Tazobactam: S <=8      -  Tobramycin: S 4      -  Trimethoprim/Sulfamethoxazole: S <=0.5/9.5    Culture - Surgical Swab (collected 07-15-23 @ 10:45)  Source: .Surgical Swab #1 Abscess of peritoneum  Final Report (07-17-23 @ 18:14):    Culture yields >4 types of aerobic and/or anaerobic bacteria    Call client services within 7 days if further workup is clinically    indicated.    Culture includes    Few Klebsiella pneumoniae    See previous culture [id number]    Culture - Urine (collected 07-09-23 @ 07:13)  Source: Catheterized Catheterized  Final Report (07-10-23 @ 14:09):    <10,000 CFU/mL Normal Urogenital Maddie    Culture - Blood (collected 07-09-23 @ 06:00)  Source: .Blood Blood  Final Report (07-14-23 @ 12:01):    No growth at 5 days    Culture - Blood (collected 07-09-23 @ 06:00)  Source: .Blood Blood  Final Report (07-14-23 @ 12:01):    No growth at 5 days        
78F with perforated diverticulitis is s/p Hartmanns procedure was evaluated at bedside. She appears much better, in NAD. She had bilious vomiting yesterday, and NGT was placed with large volume output. Since then, her nausea and vomiting has resolved. She only complains of moderate pain with urination. A U/A was performed yesterday which was negative. NAEON.      MEDICATIONS  (STANDING):  atorvastatin 40 milliGRAM(s) Oral at bedtime  carBAMazepine 200 milliGRAM(s) Oral two times a day  dextrose 5%. 1000 milliLiter(s) (100 mL/Hr) IV Continuous <Continuous>  dextrose 5%. 1000 milliLiter(s) (50 mL/Hr) IV Continuous <Continuous>  dextrose 50% Injectable 25 Gram(s) IV Push once  dextrose 50% Injectable 25 Gram(s) IV Push once  dextrose 50% Injectable 12.5 Gram(s) IV Push once  enoxaparin Injectable 40 milliGRAM(s) SubCutaneous every 24 hours  furosemide   Injectable 40 milliGRAM(s) IV Push daily  glucagon  Injectable 1 milliGRAM(s) IntraMuscular once  insulin lispro (ADMELOG) corrective regimen sliding scale   SubCutaneous every 6 hours  lactated ringers. 1000 milliLiter(s) (42 mL/Hr) IV Continuous <Continuous>  metoprolol tartrate 12.5 milliGRAM(s) Oral every 12 hours  pantoprazole  Injectable 40 milliGRAM(s) IV Push daily  PARoxetine 30 milliGRAM(s) Oral daily  piperacillin/tazobactam IVPB.. 3.375 Gram(s) IV Intermittent every 8 hours    MEDICATIONS  (PRN):  dextrose Oral Gel 15 Gram(s) Oral once PRN Blood Glucose LESS THAN 70 milliGRAM(s)/deciliter  naloxone Injectable 0.1 milliGRAM(s) IV Push every 3 minutes PRN For ANY of the following changes in patient status:  A. RR LESS THAN 10 breaths per minute, B. Oxygen saturation LESS THAN 90%, C. Sedation score of 6  ondansetron Injectable 4 milliGRAM(s) IV Push every 6 hours PRN Nausea  oxyCODONE    IR 5 milliGRAM(s) Oral every 6 hours PRN Moderate Pain (4 - 6)  oxyCODONE    IR 10 milliGRAM(s) Oral every 6 hours PRN Severe Pain (7 - 10)      PAST MEDICAL & SURGICAL HISTORY:  No pertinent past medical history      No significant past surgical history          Vital Signs Last 24 Hrs  T(C): 36.9 (19 Jul 2023 04:12), Max: 38.1 (18 Jul 2023 09:33)  T(F): 98.5 (19 Jul 2023 04:12), Max: 100.5 (18 Jul 2023 09:33)  HR: 75 (19 Jul 2023 04:12) (63 - 107)  BP: 146/72 (19 Jul 2023 04:12) (117/78 - 164/68)  BP(mean): --  RR: 18 (19 Jul 2023 04:12) (18 - 18)  SpO2: 93% (19 Jul 2023 04:12) (91% - 96%)    Parameters below as of 19 Jul 2023 04:12  Patient On (Oxygen Delivery Method): room air        I&O's Summary    18 Jul 2023 07:01  -  19 Jul 2023 07:00  --------------------------------------------------------  IN: 1183.1 mL / OUT: 2690 mL / NET: -1506.9 mL                              9.0    23.24 )-----------( 550      ( 19 Jul 2023 04:32 )             28.0     07-19    144  |  105  |  9.6  ----------------------------<  104<H>  3.8   |  30.0<H>  |  0.53    Ca    7.8<L>      19 Jul 2023 04:32  Phos  3.5     07-19  Mg     2.1     07-19    TPro  x   /  Alb  2.2<L>  /  TBili  x   /  DBili  x   /  AST  x   /  ALT  x   /  AlkPhos  x   07-19          Culture - Surgical Swab (collected 07-15-23 @ 10:45)  Source: .Surgical Swab #2 - Abscess of Peritoneum  Final Report (07-17-23 @ 18:11):    Culture yields >4 types of aerobic and/or anaerobic bacteria    Call client services within 7 days if further workup is clinically    indicated.    including    Few Klebsiella pneumoniae  Organism: Klebsiella pneumoniae (07-17-23 @ 18:11)  Organism: Klebsiella pneumoniae (07-17-23 @ 18:11)    Culture - Surgical Swab (collected 07-15-23 @ 10:45)  Source: .Surgical Swab #1 Abscess of peritoneum  Final Report (07-17-23 @ 18:14):    Culture yields >4 types of aerobic and/or anaerobic bacteria    Call client services within 7 days if further workup is clinically    indicated.    Culture includes    Few Klebsiella pneumoniae    See previous culture [id number]      Gen: pt lying in bed, alert, in NAD  Resp: unlabored  CVS: RRR  Abd: soft, NT, ND, ostomy in place with gas in bag, drain in place and functioning, midline dressing mildly saturated  Ext: moving all extremities spontaneously  Psych: AOx3        
Feels good minimal pain   afebvriel  VSS off vaopressors   abd bign   Wound care in progress  Colostomy gas and carmela   doing well   surgically stable   supportive care   diet in AM
Feels ok   afebrile VSS  abd soft nontender Wound care in progress   Ngtt 1500 cc when inserted   wbc 28 hct 30   CXR rt side hrt enlarged    Post op ileus   NGT reglan   supportive care   cont abx
HISTORY  78y Female with perforated diverticulitis, septic shock, POD 1 Fuentes's procedure     24 HOUR EVENTS: POD 1 from Fuentes's, Overnight required penylephrine to maintain BP, POCUS this morning revealed a very variable IVC as well as hyperdynamic ventricles consistent with hypovolemia, given 1 L plasma lyte with some improvement in BP, repeat CBC reveal worsening anemia and patient was give 1 U prbc.     SUBJECTIVE/ROS:  [x ] A ten-point review of systems was otherwise negative except as noted.  [ ] Due to altered mental status/intubation, subjective information were not able to be obtained from the patient. History was obtained, to the extent possible, from review of the chart and collateral sources of information.      NEURO  RASS: 0    GCS:   15  CAM ICU: neg  Exam: non focal JOSHUA  Meds: carBAMazepine 200 milliGRAM(s) Oral two times a day  HYDROmorphone PCA (1 mG/mL) 30 milliLiter(s) PCA Continuous PCA Continuous  ketorolac   Injectable 15 milliGRAM(s) IV Push every 6 hours PRN Severe Pain (7 - 10)  ondansetron Injectable 4 milliGRAM(s) IV Push every 6 hours PRN Nausea  PARoxetine 30 milliGRAM(s) Oral daily    [x] Adequacy of sedation and pain control has been assessed and adjusted      RESPIRATORY  RR: 16 (07-16-23 @ 21:00) (13 - 22)  SpO2: 96% (07-16-23 @ 21:00) (89% - 100%)  Wt(kg): --  Exam: BS decreased at bases bilaterally  Mechanical Ventilation:   ABG - ( 15 Jul 2023 16:33 )  pH: 7.340 /  pCO2: 48    /  pO2: 98    / HCO3: 26    / Base Excess: 0.1   /  SaO2: 99.7    Lactate: x                [ ] Extubation Readiness Assessed  Meds:       CARDIOVASCULAR  HR: 80 (07-16-23 @ 21:00) (65 - 91)  BP: 134/53 (07-16-23 @ 21:00) (88/59 - 134/53)  BP(mean): 76 (07-16-23 @ 21:00) (52 - 90)  ABP: 110/81 (07-16-23 @ 07:30) (86/66 - 157/63)  ABP(mean): 86 (07-16-23 @ 07:30) (52 - 87)  Wt(kg): --  CVP(cm H2O): --      Exam: s1s2   Cardiac Rhythm: sinus  Perfusion     [ ]Adequate   [x ]Inadequate  Mentation   [x ]Normal       [ ]Reduced  Extremities  [x ]Warm         [ ]Cool  Volume Status [ ]Hypervolemic [ ]Euvolemic [x ]Hypovolemic  Meds: metoprolol succinate ER 25 milliGRAM(s) Oral daily  norepinephrine Infusion 0.05 MICROgram(s)/kG/Min IV Continuous <Continuous>  phenylephrine    Infusion 0.5 MICROgram(s)/kG/Min IV Continuous <Continuous>        GI/NUTRITION  Exam: midline incsion with SS drainage, R LQ LEV SS, ostomy is dusky with sanguien OP and some clot  Diet: Clear liquids  Meds:     GENITOURINARY  I&O's Detail    07-15 @ 07:01 - 07-16 @ 07:00  --------------------------------------------------------  IN:    IV PiggyBack: 100 mL    Lactated Ringers: 375 mL    Lactated Ringers: 1000 mL    multiple electrolytes Injection Type 1.: 1200 mL    Phenylephrine: 289.6 mL  Total IN: 2964.6 mL    OUT:    Bulb (mL): 290 mL    Colostomy (mL): 550 mL    Indwelling Catheter - Urethral (mL): 1130 mL  Total OUT: 1970 mL    Total NET: 994.6 mL      07-16 @ 07:01 - 07-16 @ 21:22  --------------------------------------------------------  IN:    IV PiggyBack: 100 mL    IV PiggyBack: 50 mL    IV PiggyBack: 175 mL    multiple electrolytes Injection Type 1 Bolus: 1000 mL    multiple electrolytes Injection Type 1.: 1000 mL    Norepinephrine: 18.9 mL    Oral Fluid: 600 mL    Phenylephrine: 314.4 mL    PRBCs (Packed Red Blood Cells): 321 mL  Total IN: 3579.3 mL    OUT:    Bulb (mL): 90 mL    Colostomy (mL): 575 mL    Indwelling Catheter - Urethral (mL): 1025 mL  Total OUT: 1690 mL    Total NET: 1889.3 mL          07-16    139  |  104  |  11.9  ----------------------------<  140<H>  3.1<L>   |  25.0  |  0.69    Ca    7.4<L>      16 Jul 2023 12:08  Phos  3.2     07-16  Mg     1.9     07-16    TPro  6.6  /  Alb  2.7<L>  /  TBili  <0.2<L>  /  DBili  x   /  AST  37<H>  /  ALT  33<H>  /  AlkPhos  111  07-15    [ ] Rodriguez catheter, indication: N/A  Meds: calcium gluconate IVPB 2 Gram(s) IV Intermittent once  multiple electrolytes Injection Type 1 1000 milliLiter(s) IV Continuous <Continuous>  potassium chloride   Powder 40 milliEquivalent(s) Oral once        HEMATOLOGIC  Meds: enoxaparin Injectable 40 milliGRAM(s) SubCutaneous every 24 hours    [x] VTE Prophylaxis                        7.1    22.24 )-----------( 494      ( 16 Jul 2023 18:40 )             22.1     PT/INR - ( 15 Jul 2023 08:39 )   PT: 16.4 sec;   INR: 1.41 ratio         PTT - ( 15 Jul 2023 08:39 )  PTT:34.6 sec  Transfusion     [ ] PRBC   [ ] Platelets   [ ] FFP   [ ] Cryoprecipitate      INFECTIOUS DISEASES  T(C): 37 (07-16-23 @ 20:00), Max: 37 (07-16-23 @ 07:26)  Wt(kg): --  WBC Count: 22.24 K/uL (07-16 @ 18:40)  WBC Count: 22.15 K/uL (07-16 @ 12:08)  WBC Count: 22.48 K/uL (07-16 @ 03:30)    Recent Cultures:    Meds: piperacillin/tazobactam IVPB.. 3.375 Gram(s) IV Intermittent every 8 hours        ENDOCRINE  Capillary Blood Glucose    Meds: atorvastatin 40 milliGRAM(s) Oral at bedtime        ACCESS DEVICES:  [x ] Peripheral IV  [ ] Central Venous Line	[ ] R	[ ] L	[ ] IJ	[ ] Fem	[ ] SC	Placed:   [ ] Arterial Line		[ ] R	[ ] L	[ ] Fem	[ ] Rad	[ ] Ax	Placed:   [ ] PICC:					[ ] Mediport  [ ] Urinary Catheter, Date Placed:   [ ] Necessity of urinary, arterial, and venous catheters discussed    OTHER MEDICATIONS:  naloxone Injectable 0.1 milliGRAM(s) IV Push every 3 minutes PRN      CODE STATUS:     IMAGING:  
INTERVAL HPI/OVERNIGHT EVENTS:    Patient evaluated at bedside. No acute distress. No acute events overnight.    MEDICATIONS  (STANDING):  acetaminophen     Tablet .. 975 milliGRAM(s) Oral every 6 hours  atorvastatin 40 milliGRAM(s) Oral at bedtime  carBAMazepine 200 milliGRAM(s) Oral two times a day  clopidogrel Tablet 75 milliGRAM(s) Oral daily  enoxaparin Injectable 40 milliGRAM(s) SubCutaneous every 24 hours  hydrochlorothiazide 12.5 milliGRAM(s) Oral daily  ibuprofen  Tablet. 400 milliGRAM(s) Oral every 6 hours  lactated ringers. 1000 milliLiter(s) (100 mL/Hr) IV Continuous <Continuous>  lisinopril 5 milliGRAM(s) Oral daily  metoprolol succinate ER 25 milliGRAM(s) Oral daily  PARoxetine 30 milliGRAM(s) Oral daily  piperacillin/tazobactam IVPB.. 3.375 Gram(s) IV Intermittent every 8 hours    MEDICATIONS  (PRN):  ondansetron Injectable 4 milliGRAM(s) IV Push every 6 hours PRN Nausea      Vital Signs Last 24 Hrs  T(C): 36.4 (15 Jul 2023 05:00), Max: 36.8 (14 Jul 2023 14:35)  T(F): 97.6 (15 Jul 2023 05:00), Max: 98.3 (14 Jul 2023 22:43)  HR: 67 (15 Jul 2023 05:00) (58 - 77)  BP: 143/71 (15 Jul 2023 05:00) (118/75 - 158/77)  BP(mean): --  RR: 18 (15 Jul 2023 05:00) (17 - 18)  SpO2: 95% (15 Jul 2023 05:00) (94% - 98%)    Parameters below as of 15 Jul 2023 05:00  Patient On (Oxygen Delivery Method): room air        Constitutional: NAD  HEENT: PERRL, EOMI  Respiratory: Respirations non-labored, no accessory muscle use  Gastrointestinal: Soft, minimally tender to RLQ, non-distended  Neurological: A&O x 3      I&O's Detail      LABS:                        10.3   20.61 )-----------( 463      ( 14 Jul 2023 06:25 )             31.7     07-14    143  |  106  |  9.1  ----------------------------<  122<H>  3.3<L>   |  24.0  |  0.76    Ca    8.6      14 Jul 2023 06:25  Phos  2.9     07-14  Mg     2.1     07-14        Urinalysis Basic - ( 14 Jul 2023 06:25 )    Color: x / Appearance: x / SG: x / pH: x  Gluc: 122 mg/dL / Ketone: x  / Bili: x / Urobili: x   Blood: x / Protein: x / Nitrite: x   Leuk Esterase: x / RBC: x / WBC x   Sq Epi: x / Non Sq Epi: x / Bacteria: x        RADIOLOGY & ADDITIONAL STUDIES:
Patient seen and examined at bedside. No acute events over night. Patient had a bowel movement overnight and stool was sent for C.diff rule out. Reports to have epigastric abdominal pain overnight, but was tolerable. Denies acid reflux, nausea, and vomiting.     Vitals:  Vital Signs Last 24 Hrs  T(C): 37.2 (13 Jul 2023 04:26), Max: 37.2 (13 Jul 2023 04:26)  T(F): 98.9 (13 Jul 2023 04:26), Max: 98.9 (13 Jul 2023 04:26)  HR: 69 (13 Jul 2023 04:26) (59 - 97)  BP: 114/63 (13 Jul 2023 04:26) (114/63 - 133/73)  BP(mean): --  RR: 18 (13 Jul 2023 04:26) (18 - 20)  SpO2: 93% (13 Jul 2023 04:26) (93% - 99%)    Parameters below as of 13 Jul 2023 04:26  Patient On (Oxygen Delivery Method): room air      Labs:  07-12    139  |  102  |  9.9  ----------------------------<  78  3.4<L>   |  22.0  |  0.74    Ca    8.8      12 Jul 2023 05:45  Phos  3.3     07-12  Mg     2.0     07-12                              10.4   19.55 )-----------( 388      ( 12 Jul 2023 05:45 )             32.3     Exam:  Gen: pt lying in bed, alert, in NAD  Resp: unlabored  CVS: RRR  Abd: soft, NT, ND  Ext: moving all extremities spontaneously, sensation intact, pulses 2+ 
Patient seen and examined at bedside. No acute events overnight. Pain improved with current pain regiment.     Vitals:  Vital Signs Last 24 Hrs  T(C): 36.8 (10 Jul 2023 06:36), Max: 37.2 (09 Jul 2023 19:52)  T(F): 98.2 (10 Jul 2023 06:36), Max: 99 (09 Jul 2023 19:52)  HR: 77 (10 Jul 2023 06:36) (71 - 83)  BP: 138/67 (10 Jul 2023 06:36) (103/48 - 154/72)  BP(mean): 66 (09 Jul 2023 16:04) (66 - 66)  RR: 19 (10 Jul 2023 06:36) (15 - 19)  SpO2: 97% (10 Jul 2023 06:36) (92% - 98%)    Parameters below as of 10 Jul 2023 06:36  Patient On (Oxygen Delivery Method): room air        Labs:  07-09    137  |  101  |  11.2  ----------------------------<  112<H>  4.2   |  25.0  |  0.77    Ca    8.9      09 Jul 2023 17:54  Phos  2.9     07-09  Mg     2.0     07-09    TPro  6.4<L>  /  Alb  3.0<L>  /  TBili  0.4  /  DBili  x   /  AST  18  /  ALT  18  /  AlkPhos  77  07-09                            10.1   18.63 )-----------( 325      ( 10 Jul 2023 05:57 )             31.7       Exam:  Gen: pt lying in bed, alert, in NAD  Resp: unlabored  CVS: RRR  Abd: soft, ND, mildly tender on palpation.   Ext: moving all extremities spontaneously, sensation intact, pulses 2+ 
SURGERY    HPI  78 year old M, HTN, HLD, Seizures, CAD with one stent on plavix, h/o UTI's presenting with LLQ abdominal pain since July 5th, no history of diverticulitis, she has a family history of colon cancer in her brother, last colonoscopy was over 30 years ago, denies fever or chills, she has been nauseous but denies vomiting.  No additional complaints.  She was transferred from [facility] for a higher level of car    INTERVAL EVENTS/SUBJECTIVE: No acute events overnight. She denies any pain this morning.  She endorses having BMs.    ______________________________________________  OBJECTIVE:   T(C): 36.8 (07-12-23 @ 04:23), Max: 36.8 (07-11-23 @ 09:59)  HR: 62 (07-12-23 @ 07:03) (60 - 69)  BP: 143/74 (07-12-23 @ 04:23) (105/59 - 162/83)  RR: 18 (07-12-23 @ 04:23) (17 - 18)  SpO2: 97% (07-12-23 @ 07:03) (94% - 98%)  Wt(kg): --  CAPILLARY BLOOD GLUCOSE        I&O's Detail      Physical exam:  Gen: resting in bed comfortably in NAD  Chest: no increased WOB, regular inspiratory effort   Abdomen: Soft, nontender, nondistended with no rebound tenderness or guarding.  Vascular: WWP, JOSHUA x4  NEURO: awake, alert  ______________________________________________  LABS:  CBC Full  -  ( 12 Jul 2023 05:45 )  WBC Count : 19.55 K/uL  RBC Count : 3.21 M/uL  Hemoglobin : 10.4 g/dL  Hematocrit : 32.3 %  Platelet Count - Automated : 388 K/uL  Mean Cell Volume : 100.6 fl  Mean Cell Hemoglobin : 32.4 pg  Mean Cell Hemoglobin Concentration : 32.2 gm/dL  Auto Neutrophil # : 17.25 K/uL  Auto Lymphocyte # : 0.74 K/uL  Auto Monocyte # : 1.08 K/uL  Auto Eosinophil # : 0.26 K/uL  Auto Basophil # : 0.08 K/uL  Auto Neutrophil % : 88.3 %  Auto Lymphocyte % : 3.8 %  Auto Monocyte % : 5.5 %  Auto Eosinophil % : 1.3 %  Auto Basophil % : 0.4 %    07-12    139  |  102  |  9.9  ----------------------------<  78  3.4<L>   |  22.0  |  0.74    Ca    8.8      12 Jul 2023 05:45  Phos  3.3     07-12  Mg     2.0     07-12      _____________________________________________  RADIOLOGY:    CT Abd Pelivs 7/11/23: Pneumoperitoneum secondary to perforated diverticulitis. This finding was   discussed with Dr. Pal by Dr. Pulido on July 11, 2023, 7:20 PM.    Increase in size of left adrenal nodule compared with 2018.Recommend   nonemergent MRI.    Thickened endometrium. Recommend nonemergent pelvic ultrasound.    
SURGERY    STATUS POST:  Ex lap and arlene's procedure for perforated diverticulitis on 7/15/23    POST OPERATIVE DAY #8      INTERVAL EVENTS/SUBJECTIVE:   No acute events overnight. Ms. Rocha is feeling well this morning.  She is sitting in her chair at the bedside.  Denies nausea and vomiting and is tolerating her diet well.    ______________________________________________  OBJECTIVE:   T(C): 36.8 (07-23-23 @ 09:22), Max: 37 (07-23-23 @ 05:00)  HR: 66 (07-23-23 @ 09:22) (66 - 91)  BP: 105/66 (07-23-23 @ 09:22) (105/66 - 142/82)  RR: 17 (07-23-23 @ 09:22) (17 - 18)  SpO2: 94% (07-23-23 @ 09:22) (94% - 96%)  Wt(kg): --  CAPILLARY BLOOD GLUCOSE        I&O's Detail    22 Jul 2023 07:01  -  23 Jul 2023 07:00  --------------------------------------------------------  IN:    Oral Fluid: 480 mL  Total IN: 480 mL    OUT:    Blood Loss (mL): 20 mL    Bulb (mL): 10 mL    Colostomy (mL): 700 mL  Total OUT: 730 mL    Total NET: -250 mL          Physical exam:  Gen: resting in bed comfortably in NAD  Chest: no increased WOB, regular inspiratory effort   Abdomen: Soft, nontender, nondistended with no rebound tenderness or guarding. Incisions clean, dry, intact, with no erythema. Ostomy with good output.  Wound vac in place, tubing reconnected after rounds.  NEURO: awake, alert  ______________________________________________  LABS:  CBC Full  -  ( 23 Jul 2023 06:03 )  WBC Count : 17.33 K/uL  RBC Count : 2.95 M/uL  Hemoglobin : 9.1 g/dL  Hematocrit : 29.7 %  Platelet Count - Automated : 828 K/uL  Mean Cell Volume : 100.7 fl  Mean Cell Hemoglobin : 30.8 pg  Mean Cell Hemoglobin Concentration : 30.6 gm/dL  Auto Neutrophil # : 13.91 K/uL  Auto Lymphocyte # : 1.27 K/uL  Auto Monocyte # : 1.28 K/uL  Auto Eosinophil # : 0.34 K/uL  Auto Basophil # : 0.09 K/uL  Auto Neutrophil % : 80.3 %  Auto Lymphocyte % : 7.3 %  Auto Monocyte % : 7.4 %  Auto Eosinophil % : 2.0 %  Auto Basophil % : 0.5 %    07-23    141  |  104  |  5.1<L>  ----------------------------<  91  3.1<L>   |  26.0  |  0.57    Ca    7.7<L>      23 Jul 2023 06:03  Phos  2.6     07-23  Mg     2.2     07-23        
INTERVAL HPI/OVERNIGHT EVENTS:    Patient evaluated at bedside. No acute distress. No acute events overnight.  Tolerating clear liquid diet  Afebrile  Ambulating    MEDICATIONS  (STANDING):  acetaminophen     Tablet .. 975 milliGRAM(s) Oral every 6 hours  atorvastatin 40 milliGRAM(s) Oral at bedtime  carBAMazepine 200 milliGRAM(s) Oral two times a day  caspofungin IVPB      caspofungin IVPB 50 milliGRAM(s) IV Intermittent every 24 hours  dextrose 5%. 1000 milliLiter(s) (50 mL/Hr) IV Continuous <Continuous>  dextrose 5%. 1000 milliLiter(s) (100 mL/Hr) IV Continuous <Continuous>  dextrose 50% Injectable 25 Gram(s) IV Push once  dextrose 50% Injectable 12.5 Gram(s) IV Push once  enoxaparin Injectable 40 milliGRAM(s) SubCutaneous every 24 hours  glucagon  Injectable 1 milliGRAM(s) IntraMuscular once  metoprolol tartrate 12.5 milliGRAM(s) Oral every 12 hours  pantoprazole  Injectable 40 milliGRAM(s) IV Push daily  PARoxetine 30 milliGRAM(s) Oral daily  piperacillin/tazobactam IVPB.. 3.375 Gram(s) IV Intermittent every 8 hours    MEDICATIONS  (PRN):  naloxone Injectable 0.1 milliGRAM(s) IV Push every 3 minutes PRN For ANY of the following changes in patient status:  A. RR LESS THAN 10 breaths per minute, B. Oxygen saturation LESS THAN 90%, C. Sedation score of 6  ondansetron Injectable 4 milliGRAM(s) IV Push every 6 hours PRN Nausea  oxyCODONE    IR 5 milliGRAM(s) Oral every 6 hours PRN Moderate Pain (4 - 6)  oxyCODONE    IR 10 milliGRAM(s) Oral every 6 hours PRN Severe Pain (7 - 10)      Vital Signs Last 24 Hrs  T(C): 37.2 (21 Jul 2023 05:00), Max: 37.2 (21 Jul 2023 05:00)  T(F): 99 (21 Jul 2023 05:00), Max: 99 (21 Jul 2023 05:00)  HR: 79 (21 Jul 2023 04:16) (74 - 82)  BP: 132/74 (21 Jul 2023 04:16) (96/62 - 155/79)  BP(mean): --  RR: 20 (21 Jul 2023 04:16) (18 - 20)  SpO2: 98% (21 Jul 2023 04:16) (92% - 98%)    Parameters below as of 21 Jul 2023 04:16  Patient On (Oxygen Delivery Method): room air        Physical Exam:  Gen: pt lying in bed, alert, in NAD  Resp: unlabored  CVS: RRR  Abd: soft, NT, ND, ostomy in place functioning well. Wound dressing changed, wound looks clean, no purulent discharge/erythema/ skin necrosis.    Ext: moving all extremities spontaneously  Psych: AOx3      I&O's Detail    20 Jul 2023 07:01  -  21 Jul 2023 07:00  --------------------------------------------------------  IN:    Lactated Ringers: 630 mL    Oral Fluid: 3200 mL  Total IN: 3830 mL    OUT:    Bulb (mL): 590 mL    Colostomy (mL): 600 mL    Voided (mL): 800 mL  Total OUT: 1990 mL    Total NET: 1840 mL          LABS:                        9.5    26.67 )-----------( 584      ( 20 Jul 2023 09:02 )             30.7     07-20    141  |  100  |  10.4  ----------------------------<  90  3.6   |  28.0  |  0.53    Ca    7.8<L>      20 Jul 2023 09:02  Phos  2.5     07-20  Mg     2.1     07-20        Urinalysis Basic - ( 20 Jul 2023 09:02 )    Color: x / Appearance: x / SG: x / pH: x  Gluc: 90 mg/dL / Ketone: x  / Bili: x / Urobili: x   Blood: x / Protein: x / Nitrite: x   Leuk Esterase: x / RBC: x / WBC x   Sq Epi: x / Non Sq Epi: x / Bacteria: x        RADIOLOGY & ADDITIONAL STUDIES:
SURGERY      STATUS POST:  Ex lap, Mirtha's procedure on 7/15    POST OPERATIVE DAY #11      INTERVAL EVENTS/SUBJECTIVE: No acute events overnight. Pt is feeling well today, her pain is well controlled.      ______________________________________________  OBJECTIVE:   T(C): 36.9 (07-26-23 @ 04:12), Max: 37.1 (07-25-23 @ 17:22)  HR: 74 (07-26-23 @ 04:12) (65 - 94)  BP: 129/68 (07-26-23 @ 04:12) (100/61 - 129/68)  RR: 18 (07-26-23 @ 04:12) (18 - 18)  SpO2: 93% (07-26-23 @ 04:12) (92% - 96%)  Wt(kg): --  CAPILLARY BLOOD GLUCOSE        I&O's Detail    25 Jul 2023 07:01  -  26 Jul 2023 07:00  --------------------------------------------------------  IN:  Total IN: 0 mL    OUT:    Bulb (mL): 75 mL    Colostomy (mL): 1200 mL  Total OUT: 1275 mL    Total NET: -1275 mL          Physical exam:  Gen: resting in bed comfortably in NAD  Chest: no increased WOB, regular inspiratory effort   Abdomen: Soft, nontender, nondistended with no rebound tenderness or guarding. Incisions clean, dry, intact, with no erythema.   Vascular: WWP, JOSHUA x4  NEURO: awake, alert  ______________________________________________  LABS:  CBC Full  -  ( 25 Jul 2023 06:39 )  WBC Count : 14.27 K/uL  RBC Count : 2.59 M/uL  Hemoglobin : 8.1 g/dL  Hematocrit : 26.0 %  Platelet Count - Automated : 842 K/uL  Mean Cell Volume : 100.4 fl  Mean Cell Hemoglobin : 31.3 pg  Mean Cell Hemoglobin Concentration : 31.2 gm/dL  Auto Neutrophil # : 10.83 K/uL  Auto Lymphocyte # : 1.57 K/uL  Auto Monocyte # : 1.09 K/uL  Auto Eosinophil # : 0.31 K/uL  Auto Basophil # : 0.10 K/uL  Auto Neutrophil % : 75.9 %  Auto Lymphocyte % : 11.0 %  Auto Monocyte % : 7.6 %  Auto Eosinophil % : 2.2 %  Auto Basophil % : 0.7 %    07-25    143  |  107  |  6.8<L>  ----------------------------<  116<H>  3.7   |  24.0  |  0.46<L>    Ca    7.9<L>      25 Jul 2023 18:20  Phos  2.1     07-25  Mg     1.9     07-25      _____________________________________________  RADIOLOGY:    
                                                         [facility]                                                         CARDIOLOGY AT [facility]                                                                  [address] [facility], [place] [place] [address]                                                         Telephone: [phone]. Fax:[phone]                                                                             PROGRESS NOTE    Reason for follow up: Volume overload  Update: Patient underwent a CTA chest yesterday that showed no PE and no pulmonary vascular congestion. Patient has no complaints of shortness of breath at this time.       Review of symptoms:   Cardiac:  No chest pain. No dyspnea. No palpitations.  Respiratory: no cough. No dyspnea  Gastrointestinal: No diarrhea. No abdominal pain. No bleeding.   Neuro: No focal neuro complaints.    Vitals:  T(C): 37 (07-19-23 @ 08:01), Max: 38.1 (07-18-23 @ 09:33)  HR: 78 (07-19-23 @ 08:01) (63 - 107)  BP: 157/81 (07-19-23 @ 08:01) (117/78 - 164/68)  RR: 18 (07-19-23 @ 08:01) (18 - 18)  SpO2: 92% (07-19-23 @ 08:01) (91% - 96%)  Wt(kg): --  I&O's Summary    18 Jul 2023 07:01  -  19 Jul 2023 07:00  --------------------------------------------------------  IN: 1183.1 mL / OUT: 2730 mL / NET: -1546.9 mL      PHYSICAL EXAM:  Appearance: Comfortable. No acute distress  HEENT:  Atraumatic. Normocephalic.  Normal oral mucosa, +NGT  Neurologic: A & O x 3, no gross focal deficits.  Cardiovascular: RRR S1 S2, No murmur, no rubs/gallops. No JVD  Respiratory: Decreased air movement bilaterally   Gastrointestinal:  Soft, Non-tender, + BS  Lower Extremities: 2+ Peripheral Pulses, No clubbing, cyanosis, 1 + b/l LE edema  Psychiatry: Patient is calm. No agitation.   Skin: warm and dry.    CURRENT CARDIAC MEDICATIONS:  furosemide   Injectable 40 milliGRAM(s) IV Push daily  metoprolol tartrate 12.5 milliGRAM(s) Oral every 12 hours      CURRENT OTHER MEDICATIONS:  piperacillin/tazobactam IVPB.. 3.375 Gram(s) IV Intermittent every 8 hours  carBAMazepine 200 milliGRAM(s) Oral two times a day  ondansetron Injectable 4 milliGRAM(s) IV Push every 6 hours PRN Nausea  oxyCODONE    IR 5 milliGRAM(s) Oral every 6 hours PRN Moderate Pain (4 - 6)  oxyCODONE    IR 10 milliGRAM(s) Oral every 6 hours PRN Severe Pain (7 - 10)  PARoxetine 30 milliGRAM(s) Oral daily  pantoprazole  Injectable 40 milliGRAM(s) IV Push daily  atorvastatin 40 milliGRAM(s) Oral at bedtime  dextrose 50% Injectable 25 Gram(s) IV Push once, Stop order after: 1 Doses  dextrose 50% Injectable 25 Gram(s) IV Push once, Stop order after: 1 Doses  dextrose 50% Injectable 12.5 Gram(s) IV Push once, Stop order after: 1 Doses  dextrose Oral Gel 15 Gram(s) Oral once, Stop order after: 1 Doses PRN Blood Glucose LESS THAN 70 milliGRAM(s)/deciliter  glucagon  Injectable 1 milliGRAM(s) IntraMuscular once, Stop order after: 1 Doses  insulin lispro (ADMELOG) corrective regimen sliding scale   SubCutaneous every 6 hours  dextrose 5%. 1000 milliLiter(s) (100 mL/Hr) IV Continuous <Continuous>  dextrose 5%. 1000 milliLiter(s) (50 mL/Hr) IV Continuous <Continuous>  enoxaparin Injectable 40 milliGRAM(s) SubCutaneous every 24 hours  lactated ringers. 1000 milliLiter(s) (42 mL/Hr) IV Continuous <Continuous>      LABS:	 	                            9.0    23.24 )-----------( 550      ( 19 Jul 2023 04:32 )             28.0     07-19    144  |  105  |  9.6  ----------------------------<  104<H>  3.8   |  30.0<H>  |  0.53    Ca    7.8<L>      19 Jul 2023 04:32  Phos  3.5     07-19  Mg     2.1     07-19    TPro  x   /  Alb  2.2<L>  /  TBili  x   /  DBili  x   /  AST  x   /  ALT  x   /  AlkPhos  x   07-19    PT/INR/PTT ( 18 Jul 2023 05:00 )                       :                       :      20.6         :       X                     .        .                   .              .           .       1.77        .                                       Lipid Profile:   HgA1c:   TSH:     TELEMETRY: SR  ECG:    DIAGNOSTIC TESTING:  [ ] Echocardiogram:     < from: TTE Echo Complete w/ Contrast w/ Doppler (07.14.23 @ 17:31) >  PHYSICIAN INTERPRETATION:  Left Ventricle: The left ventricular internal cavity size is normal. Left   ventricular wall thickness is normal.  Global LV systolic function was normal. Left ventricular ejection   fraction, by visual estimation, is 55 to 60%. Spectral Doppler shows   normal pattern of LV diastolic filling.  Right Ventricle: The right ventricular size is mildly enlarged. RV   systolic function is normal.  Left Atrium: Normal left atrial size.  Right Atrium: Mildly enlarged right atrium.  Pericardium: There is no evidence of pericardial effusion.  Mitral Valve: There is mild mitral annular calcification. Trace mitral   valve regurgitation is seen.  Tricuspid Valve: The tricuspid valve is normal in structure. Trivial   tricuspid regurgitation is visualized. Estimated pulmonary artery   systolic pressure is 38.5 mmHg assuming a right atrial pressure of 3   mmHg, which is consistent with borderline pulmonary hypertension.  Aortic Valve: Normal trileaflet aortic valve with normal opening. The   aortic valve is trileaflet. Trivial aortic valve regurgitation is seen.  Pulmonic Valve: The pulmonic valve is normal. Trace pulmonic valve   regurgitation.  Aorta: The aortic root and ascending aorta are structurally normal, with   no evidence of dilitation.  Pulmonary Artery: The main pulmonary artery is normal in size.  Venous: The inferior vena cava was normal sized, with respiratory size   variation greater than 50%.  In comparison to the previous echocardiogram(s): There are no prior   studies on this patient for comparison purposes.      Summary:   1. Left ventricular ejection fraction, by visual estimation, is 55 to   60%.   2. Normal global left ventricular systolic function.   3. Mildly enlarged right ventricle, with normal RV function with TAPSE   2.50 cm.   4. Mildly enlarged right atrium.   5. Normal left atrial size.   6. Trace tricuspid regugitation with estimated pulmonary artery systolic   pressure is 38.5 mmHg assuming a right atrial pressure of 3 mmHg, which   is consistent with borderline pulmonary hypertension.   7. Trace mitral valve regurgitation.   8. Mild mitral annular calcification.   9. Normal trileaflet aortic valve with normal opening.  10. There is no evidence of pericardial effusion.  11. There are no prior studies on this patient for comparison purposes.    MD Myrna Electronically signed on 7/14/2023 at 6:26:25 PM    < end of copied text >  [ ]  Catheterization:  [ ] Stress Test:    OTHER: 	  < from: CT Angio Chest PE Protocol w/ IV Cont (07.18.23 @ 18:44) >  FINDINGS:  CTA: The study is technically adequate with a good contrast bolus to the   pulmonary arteries. No pulmonary embolism, with limited evaluation of   subsegmental branches in the lower lobes due to motion artifact. The   pulmonary artery measures 3.1 cm. Mid ascending aorta measures 3.6 cm.   The heart is normal in size. Coronary arterial and mitral annular   calcification. No pericardial effusion.    Lungs/Airways/Pleura: The central airways are patent. No pleural   effusion. Mild bibasilar atelectasis. The lungs are otherwise clear.    Mediastinum/Lymph nodes: No thoracic adenopathy.    Upper Abdomen: Enteric tube terminates in the stomach. Cholecystectomy.    Bones and Soft Tissues: Plain screw fixation ofthe right humeral shaft.   Remote right-sided rib fractures.    IMPRESSION:  No pulmonary embolism.    No pneumonia.    < end of copied text >      
                                                         [facility]                                                         CARDIOLOGY AT [address]- [facility]                                                         Telephone: [phone]. Fax:[phone]                                                                             PROGRESS NOTE    Reason for follow up: Volume Overload  Update: Called to re-evaluate the patient due to worsening edema and some hypoxia. Patient states she doesn't feel short of breath at this time, but she has received multiple PRBCs, IVF's, and FFP. Patient also with noted hypoalbuminemia as well. Patient with continued nausea s/p NGT.       Review of symptoms:   Cardiac:  No chest pain. No dyspnea. No palpitations.  Respiratory: no cough. No dyspnea  Gastrointestinal: No diarrhea. + abdominal pain. No bleeding.   Neuro: No focal neuro complaints.    Vitals:  T(C): 37.3 (07-18-23 @ 13:38), Max: 38.1 (07-18-23 @ 09:33)  HR: 107 (07-18-23 @ 13:38) (78 - 107)  BP: 142/79 (07-18-23 @ 13:38) (117/78 - 158/67)  RR: 18 (07-18-23 @ 13:38) (15 - 21)  SpO2: 94% (07-18-23 @ 13:38) (92% - 96%)  Wt(kg): --  I&O's Summary    17 Jul 2023 07:01  -  18 Jul 2023 07:00  --------------------------------------------------------  IN: 1235.8 mL / OUT: 1710 mL / NET: -474.2 mL    18 Jul 2023 07:01  -  18 Jul 2023 14:31  --------------------------------------------------------  IN: 500 mL / OUT: 1860 mL / NET: -1360 mL    PHYSICAL EXAM:  Appearance: Comfortable. No acute distress  HEENT:  Atraumatic. Normocephalic.  Normal oral mucosa, +NGT  Neurologic: A & O x 3, no gross focal deficits.  Cardiovascular: RRR S1 S2, No murmur, no rubs/gallops. No JVD  Respiratory: Decreased air movement bilaterally   Gastrointestinal:  Soft, Non-tender, + BS  Lower Extremities: 2+ Peripheral Pulses, No clubbing, cyanosis, 1 + b/l LE edema  Psychiatry: Patient is calm. No agitation.   Skin: warm and dry.    CURRENT CARDIAC MEDICATIONS:  furosemide   Injectable 40 milliGRAM(s) IV Push daily  metoprolol tartrate 12.5 milliGRAM(s) Oral every 12 hours      CURRENT OTHER MEDICATIONS:  piperacillin/tazobactam IVPB.. 3.375 Gram(s) IV Intermittent every 8 hours  carBAMazepine 200 milliGRAM(s) Oral two times a day  ondansetron Injectable 4 milliGRAM(s) IV Push every 6 hours PRN Nausea  oxyCODONE    IR 5 milliGRAM(s) Oral every 6 hours PRN Moderate Pain (4 - 6)  oxyCODONE    IR 10 milliGRAM(s) Oral every 6 hours PRN Severe Pain (7 - 10)  PARoxetine 30 milliGRAM(s) Oral daily  pantoprazole  Injectable 40 milliGRAM(s) IV Push daily  atorvastatin 40 milliGRAM(s) Oral at bedtime  dextrose 50% Injectable 12.5 Gram(s) IV Push once, Stop order after: 1 Doses  dextrose 50% Injectable 25 Gram(s) IV Push once, Stop order after: 1 Doses  dextrose 50% Injectable 25 Gram(s) IV Push once, Stop order after: 1 Doses  dextrose Oral Gel 15 Gram(s) Oral once, Stop order after: 1 Doses PRN Blood Glucose LESS THAN 70 milliGRAM(s)/deciliter  glucagon  Injectable 1 milliGRAM(s) IntraMuscular once, Stop order after: 1 Doses  insulin lispro (ADMELOG) corrective regimen sliding scale   SubCutaneous every 6 hours  dextrose 5%. 1000 milliLiter(s) (50 mL/Hr) IV Continuous <Continuous>  dextrose 5%. 1000 milliLiter(s) (100 mL/Hr) IV Continuous <Continuous>  enoxaparin Injectable 40 milliGRAM(s) SubCutaneous every 24 hours      LABS:	 	                            9.8    28.96 )-----------( 536      ( 18 Jul 2023 10:34 )             28.4     07-18    139  |  101  |  8.8  ----------------------------<  141<H>  3.7   |  27.0  |  0.61    Ca    7.9<L>      18 Jul 2023 10:34  Phos  2.1     07-18  Mg     2.2     07-18      PT/INR/PTT ( 18 Jul 2023 05:00 )                       :                       :      20.6         :       X                     .        .                   .              .           .       1.77        .                                       Lipid Profile:   HgA1c:   TSH:     TELEMETRY: SR  ECG:    DIAGNOSTIC TESTING:  [ ] Echocardiogram:   < from: TTE Echo Complete w/ Contrast w/ Doppler (07.14.23 @ 17:31) >  PHYSICIAN INTERPRETATION:  Left Ventricle: The left ventricular internal cavity size is normal. Left   ventricular wall thickness is normal.  Global LV systolic function was normal. Left ventricular ejection   fraction, by visual estimation, is 55 to 60%. Spectral Doppler shows   normal pattern of LV diastolic filling.  Right Ventricle: The right ventricular size is mildly enlarged. RV   systolic function is normal.  Left Atrium: Normal left atrial size.  Right Atrium: Mildly enlarged right atrium.  Pericardium: There is no evidence of pericardial effusion.  Mitral Valve: There is mild mitral annular calcification. Trace mitral   valve regurgitation is seen.  Tricuspid Valve: The tricuspid valve is normal in structure. Trivial   tricuspid regurgitation is visualized. Estimated pulmonary artery   systolic pressure is 38.5 mmHg assuming a right atrial pressure of 3   mmHg, which is consistent with borderline pulmonary hypertension.  Aortic Valve: Normal trileaflet aortic valve with normal opening. The   aortic valve is trileaflet. Trivial aortic valve regurgitation is seen.  Pulmonic Valve: The pulmonic valve is normal. Trace pulmonic valve   regurgitation.  Aorta: The aortic root and ascending aorta are structurally normal, with   no evidence of dilitation.  Pulmonary Artery: The main pulmonary artery is normal in size.  Venous: The inferior vena cava was normal sized, with respiratory size   variation greater than 50%.  In comparison to the previous echocardiogram(s): There are no prior   studies on this patient for comparison purposes.      Summary:   1. Left ventricular ejection fraction, by visual estimation, is 55 to   60%.   2. Normal global left ventricular systolic function.   3. Mildly enlarged right ventricle, with normal RV function with TAPSE   2.50 cm.   4. Mildly enlarged right atrium.   5. Normal left atrial size.   6. Trace tricuspid regugitation with estimated pulmonary artery systolic   pressure is 38.5 mmHg assuming a right atrial pressure of 3 mmHg, which   is consistent with borderline pulmonary hypertension.   7. Trace mitral valve regurgitation.   8. Mild mitral annular calcification.   9. Normal trileaflet aortic valve with normal opening.  10. There is no evidence of pericardial effusion.  11. There are no prior studies on this patient for comparison purposes.    MD Myrna Electronically signed on 7/14/2023 at 6:26:25 PM    < end of copied text >    [ ]  Catheterization:    [ ] Stress Test:      OTHER:

## 2023-07-26 NOTE — DISCHARGE NOTE PROVIDER - NSDCCPCAREPLAN_GEN_ALL_CORE_FT
PRINCIPAL DISCHARGE DIAGNOSIS  Diagnosis: Perforated diverticulum of large intestine  Assessment and Plan of Treatment:

## 2023-07-26 NOTE — PROGRESS NOTE ADULT - ASSESSMENT
A: Patient is a 79 yo F s/p ex lap, Hartmanns procedure, pod# 11, for perforated diverticulitis, also with candida fungemia, on Zosyn and caspofungin. She is tolerating a regular diet, afebrile, hemodynamically stable, wbc down to 14.4 from 16.5.     Plan:   - Per ID, appreciate their recs: c/w abx  IV while inpatient. when ready for DC suggest oral vantin 200mg bid, metronidazole 500mg po q8h and fluconazole po 200mg daily through 7/29/23  - Pain control prn   - Strict I&Os   - Home meds restarted   - Replaced ostomy fluid loses  - Will f/u AM labs   - Encourage oob ambulation and incentive spirometer   - DVT ppx with SCDs and Lovenox    - Will f/u with CM about RADHA placement status  - Low fiber diet      Royal Rico MD  General Surgery Resident  [facility]

## 2023-07-26 NOTE — DISCHARGE NOTE PROVIDER - NSDCFUSCHEDAPPT_GEN_ALL_CORE_FT
[facility]  [address] [facility]  Scheduled Appointment: 08/24/2023

## 2023-07-26 NOTE — DISCHARGE NOTE PROVIDER - CARE PROVIDER_API CALL
Derek Gutierrez  Vascular Surgery  [address] [facility] [address]  [place] [address]  Phone: [phone]  Fax: [phone]  Follow Up Time: 2 weeks    Christin Blake  Infectious Disease  [address] [place] [id number]  Phone: [phone]  Fax: [phone]  Follow Up Time: 2 weeks

## 2023-07-26 NOTE — DISCHARGE NOTE NURSING/CASE MANAGEMENT/SOCIAL WORK - NSDCFUADDAPPT_GEN_ALL_CORE_FT
[facility] [place]   [address] [place] [address]

## 2023-07-26 NOTE — PROGRESS NOTE ADULT - PROVIDER SPECIALTY LIST ADULT
Colorectal Surgery
Infectious Disease
Infectious Disease
Surgery
Colorectal Surgery
Infectious Disease
SICU
SICU
Surgery
Cardiology
Infectious Disease
Surgery
Infectious Disease
Surgery
Cardiology

## 2023-07-26 NOTE — DISCHARGE NOTE NURSING/CASE MANAGEMENT/SOCIAL WORK - NSDCPEFALRISK_GEN_ALL_CORE
For information on Fall & Injury Prevention, visit: [email or web address] OR  [email or web address] OR  https://www.cdc.gov/steadi/patient.html

## 2023-07-26 NOTE — PROGRESS NOTE ADULT - REASON FOR ADMISSION
Perforated Diverticulitis
[FreeTextEntry1] : I have independently reviewed patient's EGD 12/21/2022

## 2023-07-26 NOTE — DISCHARGE NOTE PROVIDER - PROVIDER TOKENS
PROVIDER:[TOKEN:[[id number]],FOLLOWUP:[2 weeks]],PROVIDER:[TOKEN:[[id number]],FOLLOWUP:[2 weeks]]

## 2023-07-26 NOTE — PROGRESS NOTE ADULT - NUTRITIONAL ASSESSMENT
This patient has been assessed with a concern for Malnutrition and has been determined to have a diagnosis/diagnoses of Morbid obesity (BMI > 40).    This patient is being managed with:   Diet Low Fiber-  Entered: Jul 21 2023  8:22AM  
This patient has been assessed with a concern for Malnutrition and has been determined to have a diagnosis/diagnoses of Morbid obesity (BMI > 40).    This patient is being managed with:   Diet Low Fiber-  Entered: Jul 21 2023  8:22AM  
This patient has been assessed with a concern for Malnutrition and has been determined to have a diagnosis/diagnoses of Morbid obesity (BMI > 40).    This patient is being managed with:   Diet NPO-  Entered: Jul 18 2023 10:23AM  
This patient has been assessed with a concern for Malnutrition and has been determined to have a diagnosis/diagnoses of Morbid obesity (BMI > 40).    This patient is being managed with:   Diet NPO-  Entered: Jul 18 2023 10:23AM  
This patient has been assessed with a concern for Malnutrition and has been determined to have a diagnosis/diagnoses of Morbid obesity (BMI > 40).    This patient is being managed with:   Diet Low Fiber-  Entered: Jul 21 2023  8:22AM  
This patient has been assessed with a concern for Malnutrition and has been determined to have a diagnosis/diagnoses of Morbid obesity (BMI > 40).    This patient is being managed with:   Diet NPO-  Entered: Jul 18 2023 10:23AM  
This patient has been assessed with a concern for Malnutrition and has been determined to have a diagnosis/diagnoses of Morbid obesity (BMI > 40).    This patient is being managed with:   Diet Clear Liquid-  Entered: Jul 16 2023 12:26PM  
This patient has been assessed with a concern for Malnutrition and has been determined to have a diagnosis/diagnoses of Morbid obesity (BMI > 40).    This patient is being managed with:   Diet Low Fiber-  Entered: Jul 21 2023  8:22AM  
This patient has been assessed with a concern for Malnutrition and has been determined to have a diagnosis/diagnoses of Morbid obesity (BMI > 40).    This patient is being managed with:   Diet NPO-  Entered: Jul 18 2023 10:23AM  
This patient has been assessed with a concern for Malnutrition and has been determined to have a diagnosis/diagnoses of Morbid obesity (BMI > 40).    This patient is being managed with:   Diet Low Fiber-  Entered: Jul 21 2023  8:22AM

## 2023-07-31 NOTE — CHART NOTE - NSCHARTNOTEFT_GEN_A_CORE
In response to the CDI query on 7/31, the POA status of sepsis was Evolving sepsis present on admission.    Thank you for your enquiry.      Royal Rico MD  General Surgery Resident

## 2023-07-31 NOTE — CDI QUERY NOTE - NSCDIOTHERTXTBX_GEN_ALL_CORE_HH
As per ED note, Per EMS BP 60s systolic en route with WBC of 23, vital sign were normal upon arrival. Can you please clarify the POA status of sepsis.  a.	Early sepsis present on admission  b.	Evolving sepsis present on admission  c.	Postoperative sepsis developed during hospital stay  d.	Sepsis developed during hospital stay  e.	Other, please specify      Supporting documentation:        Vital Signs    Temperature  Temp (F): 97.9 Degrees F  Temp (C) Temp (C): 36.6 Degrees C  Temp site Temp Site: oral    Heart Rate  Heart Rate Heart Rate (beats/min): 72 /min  Heart Rate Method Method: cardiac monitor    Noninvasive Blood Pressure  BP Systolic Systolic: 122 mm Hg  BP Diastolic Diastolic (mm Hg): 56 mm Hg  Blood Pressure - Site Site: left upper arm  Blood Pressure - Method Method: electronic    Respiratory/Pulse Oximetry/Oxygen Therapy  Respiration Rate (breaths/min) Respiration Rate (breaths/min): 16 /min  SpO2 (%) SpO2 (%): 97 %      WBC Count:  WBC Count: 17.63 K/uL (07.15.23 @ 08:39)   WBC Count: 20.61 K/uL (07.14.23 @ 06:25)   WBC Count: 18.70 K/uL (07.13.23 @ 06:47)   WBC Count: 19.55 K/uL (07.12.23 @ 05:45)   WBC Count: 17.24 K/uL (07.11.23 @ 06:15)   WBC Count: 18.63 K/uL (07.10.23 @ 05:57)   WBC Count: 21.06 K/uL (07.09.23 @ 17:54)   WBC Count: 23.00 K/uL (07.09.23 @ 06:00)       Lactate, Blood:  Lactate, Blood: 1.0 mmol/L (07.18.23 @ 10:34)   Lactate, Blood: 2.0 mmol/L (07.15.23 @ 16:20)       Blood Gas Venous - Lactate:  Blood Gas Venous - Lactate: 0.90 mmol/L (07.09.23 @ 06:00)       ED Provider Note [Charted Location: [facility] 2GUL [id number] [id number]] [Authored: 09-Jul-2023 06:01]  Chief Complaint: abdominal pain.    • Chief Complaint: The patient is a 78y Female complaining of abdominal pain.  • HPI Objective Statement: 78 year old female with PMHx of HTN, HLD, seizures, CAD with stent x1, transferred from [facility] for acute perforated sigmoid diverticulitis and UTI. Patient reports has had about 3 weeks of lower abdominal pain with intermittent chills and dysuria that worsened today so went to ED. Reviewed results from [facility] - patient with leukocytosis 21.95, imaging demonstrating acute perforated sigmoid diverticulitis and likely developing fistulization to adjacent small bowel and possibly uterus. Also with +UTI. Patient received Zosyn and 400cc fluids prior to arrival. Per EMS BP 60s systolic en route. On arrival /54 without intervention, patient mentating well. No acute complaints on initial evaluation. Denies nausea, vomiting, diarrhea. Reports no history of diverticulitis.    SEPSIS – was this patient treated for sepsis? Yes.     Presentation suggestive of: Bacterial Etiology.        Brief Operative Note [Charted Location: [place] [address]] [Authored: 15-Jul-2023 13:38]    • Select type of infection:	abscess  feculent peritonitis/sepsis      Progress Note Adult-SICU Physician Assistant/Attending [Charted Location: [place] [address]] [Authored: 16-Jul-2023 16:04]  HISTORY  78y Female with perforated diverticulitis, septic shock, POD 1 Fuentes's procedure      Dispo: will continue to monitor in ICU for ongoing distributive shock requiring vasopressors related to perforated diverticulitis         caspofungin IVPB 50 milliGRAM(s) IV Intermittent every 24 hours  piperacillin/tazobactam IVPB.. 3.375 Gram(s) IV Intermittent every 8 hours

## 2023-07-31 NOTE — CHART NOTE - NSCHARTNOTESELECT_GEN_ALL_CORE
Transfer Note
CDI Query Note/Post-Discharge Note
Chart note
Event Note
NGT removal
Nutrition Services
Post op check/Event Note
SICU Downgrade Note/Transfer Note

## 2023-08-07 ENCOUNTER — APPOINTMENT (OUTPATIENT)
Dept: INTERNAL MEDICINE | Facility: CLINIC | Age: 78
End: 2023-08-07
Payer: MEDICARE

## 2023-08-07 VITALS
WEIGHT: 272 LBS | HEIGHT: 66 IN | HEART RATE: 72 BPM | OXYGEN SATURATION: 100 % | BODY MASS INDEX: 43.71 KG/M2 | TEMPERATURE: 97.2 F | SYSTOLIC BLOOD PRESSURE: 129 MMHG | DIASTOLIC BLOOD PRESSURE: 64 MMHG

## 2023-08-07 DIAGNOSIS — K57.20 DIVERTICULITIS OF LARGE INTESTINE WITH PERFORATION AND ABSCESS W/OUT BLEEDING: ICD-10-CM

## 2023-08-07 DIAGNOSIS — K57.32 DIVERTICULITIS OF LARGE INTESTINE W/OUT PERFORATION OR ABSCESS W/OUT BLEEDING: ICD-10-CM

## 2023-08-07 PROCEDURE — 99212 OFFICE O/P EST SF 10 MIN: CPT

## 2023-08-07 PROCEDURE — 99202 OFFICE O/P NEW SF 15 MIN: CPT

## 2023-08-08 LAB
ALBUMIN SERPL ELPH-MCNC: 3.5 G/DL
ALP BLD-CCNC: 122 U/L
ALT SERPL-CCNC: 13 U/L
ANION GAP SERPL CALC-SCNC: 15 MMOL/L
AST SERPL-CCNC: 21 U/L
BILIRUB SERPL-MCNC: 0.2 MG/DL
BUN SERPL-MCNC: 14 MG/DL
CALCIUM SERPL-MCNC: 9.4 MG/DL
CHLORIDE SERPL-SCNC: 101 MMOL/L
CO2 SERPL-SCNC: 24 MMOL/L
CREAT SERPL-MCNC: 0.66 MG/DL
EGFR: 91 ML/MIN/1.73M2
GLUCOSE SERPL-MCNC: 124 MG/DL
POTASSIUM SERPL-SCNC: 4 MMOL/L
PROT SERPL-MCNC: 7.4 G/DL
SODIUM SERPL-SCNC: 140 MMOL/L

## 2023-08-24 ENCOUNTER — APPOINTMENT (OUTPATIENT)
Dept: ORTHOPEDIC SURGERY | Facility: CLINIC | Age: 78
End: 2023-08-24

## 2023-08-29 ENCOUNTER — APPOINTMENT (OUTPATIENT)
Dept: OBGYN | Facility: CLINIC | Age: 78
End: 2023-08-29
Payer: MEDICARE

## 2023-08-29 VITALS
DIASTOLIC BLOOD PRESSURE: 76 MMHG | BODY MASS INDEX: 43.71 KG/M2 | HEIGHT: 66 IN | SYSTOLIC BLOOD PRESSURE: 120 MMHG | WEIGHT: 272 LBS

## 2023-08-29 DIAGNOSIS — Z63.4 DISAPPEARANCE AND DEATH OF FAMILY MEMBER: ICD-10-CM

## 2023-08-29 DIAGNOSIS — Z87.891 PERSONAL HISTORY OF NICOTINE DEPENDENCE: ICD-10-CM

## 2023-08-29 DIAGNOSIS — Z82.49 FAMILY HISTORY OF ISCHEMIC HEART DISEASE AND OTHER DISEASES OF THE CIRCULATORY SYSTEM: ICD-10-CM

## 2023-08-29 DIAGNOSIS — Z78.9 OTHER SPECIFIED HEALTH STATUS: ICD-10-CM

## 2023-08-29 DIAGNOSIS — Z01.419 ENCOUNTER FOR GYNECOLOGICAL EXAMINATION (GENERAL) (ROUTINE) W/OUT ABNORMAL FINDINGS: ICD-10-CM

## 2023-08-29 DIAGNOSIS — N95.0 POSTMENOPAUSAL BLEEDING: ICD-10-CM

## 2023-08-29 PROCEDURE — 99214 OFFICE O/P EST MOD 30 MIN: CPT

## 2023-08-29 SDOH — SOCIAL STABILITY - SOCIAL INSECURITY: DISSAPEARANCE AND DEATH OF FAMILY MEMBER: Z63.4

## 2023-08-30 LAB — HPV HIGH+LOW RISK DNA PNL CVX: NOT DETECTED

## 2023-09-01 NOTE — H&P PST ADULT - IS PATIENT PREGNANT?
HEART FAILURE - CONGESTIVE HEART FAILURE  DISCHARGE INSTRUCTIONS:  GUIDELINES TO FOLLOW AT [facility]/SNF/ Assisted Living    Future Appointments   Date Time Provider [address]   9/14/2023  3:00 PM Delfino Hadley MD [facility]          MEDICATIONS:  Please notify the doctor if patient is not able to take their medications or if medications are being held for any reasons (such as low blood pressure ect.)  Do not give the patient ibuprofen (Advil or Motrin), naproxen (Aleve) without talking to the doctor first. This could make their heart failure worse. WEIGHT MONITORING:   Weigh patient every day in the morning after they void (If patient is able to stand, please get a standing weight.)   Notify the doctor of a weight gain of 3 pounds or more in 1 day   OR  a total of 5 pounds or more in 1 week             DIET   Cardiac heart healthy diet:  Low sodium diet: no  more than 2,000mg (2 grams) of salt / sodium per day (which equals to a little less than  a teaspoon of salt)/ Cardiac Diet: Low saturated / low trans fat, no added salt, caffeine restricted    If patient is there for rehab and will be returning home in the near future; reinforce with the patient and the family to follow a low sodium diet (2,000 mg)- avoid using salt at the table, avoid / limit use of canned soups, processed / packaged foods, salted snacks, olives and pickles. Do not use a salt substitute without checking with the doctor. (Mrs. Trent Min is safe to use).        NOTIFY THE DOCTOR THE FIRST DAY OF ONSET OF ANY OF THESE   SYMPTOMS:   Weight gain of 3 pounds or more in 1 day         OR 5 pounds or more in one week  More shortness of breath  More swelling in stomach, legs, ankles or feet  Feeling more tired, No energy  Dry hacky cough  Dizziness  More chest pain / discomfort  Hard time breathing laying down
no

## 2023-09-05 LAB — CYTOLOGY CVX/VAG DOC THIN PREP: NORMAL

## 2023-09-21 ENCOUNTER — APPOINTMENT (OUTPATIENT)
Dept: OBGYN | Facility: CLINIC | Age: 78
End: 2023-09-21
Payer: MEDICARE

## 2023-09-21 VITALS
BODY MASS INDEX: 42.43 KG/M2 | SYSTOLIC BLOOD PRESSURE: 118 MMHG | HEIGHT: 66 IN | DIASTOLIC BLOOD PRESSURE: 80 MMHG | WEIGHT: 264 LBS

## 2023-09-21 DIAGNOSIS — N95.0 POSTMENOPAUSAL BLEEDING: ICD-10-CM

## 2023-09-21 PROCEDURE — 58558Z: CUSTOM

## 2023-09-21 PROCEDURE — 99214 OFFICE O/P EST MOD 30 MIN: CPT | Mod: 25

## 2023-09-26 ENCOUNTER — APPOINTMENT (OUTPATIENT)
Dept: OBGYN | Facility: CLINIC | Age: 78
End: 2023-09-26
Payer: MEDICARE

## 2023-09-26 DIAGNOSIS — Z01.818 ENCOUNTER FOR OTHER PREPROCEDURAL EXAMINATION: ICD-10-CM

## 2023-09-26 DIAGNOSIS — N84.0 POLYP OF CORPUS UTERI: ICD-10-CM

## 2023-09-26 DIAGNOSIS — R93.89 ABNORMAL FINDINGS ON DIAGNOSTIC IMAGING OF OTHER SPECIFIED BODY STRUCTURES: ICD-10-CM

## 2023-09-26 PROCEDURE — 99213 OFFICE O/P EST LOW 20 MIN: CPT

## 2023-09-27 ENCOUNTER — OUTPATIENT (OUTPATIENT)
Dept: OUTPATIENT SERVICES | Facility: HOSPITAL | Age: 78
LOS: 1 days | End: 2023-09-27
Payer: MEDICARE

## 2023-09-27 VITALS
SYSTOLIC BLOOD PRESSURE: 139 MMHG | OXYGEN SATURATION: 94 % | TEMPERATURE: 98 F | HEIGHT: 66 IN | WEIGHT: 266.1 LBS | HEART RATE: 59 BPM | RESPIRATION RATE: 18 BRPM | DIASTOLIC BLOOD PRESSURE: 60 MMHG

## 2023-09-27 DIAGNOSIS — Z87.19 PERSONAL HISTORY OF OTHER DISEASES OF THE DIGESTIVE SYSTEM: ICD-10-CM

## 2023-09-27 DIAGNOSIS — Z96.642 PRESENCE OF LEFT ARTIFICIAL HIP JOINT: Chronic | ICD-10-CM

## 2023-09-27 DIAGNOSIS — Z90.49 ACQUIRED ABSENCE OF OTHER SPECIFIED PARTS OF DIGESTIVE TRACT: Chronic | ICD-10-CM

## 2023-09-27 DIAGNOSIS — Z98.890 OTHER SPECIFIED POSTPROCEDURAL STATES: Chronic | ICD-10-CM

## 2023-09-27 DIAGNOSIS — Z95.5 PRESENCE OF CORONARY ANGIOPLASTY IMPLANT AND GRAFT: ICD-10-CM

## 2023-09-27 DIAGNOSIS — Z93.9 ARTIFICIAL OPENING STATUS, UNSPECIFIED: Chronic | ICD-10-CM

## 2023-09-27 DIAGNOSIS — I10 ESSENTIAL (PRIMARY) HYPERTENSION: ICD-10-CM

## 2023-09-27 DIAGNOSIS — R56.9 UNSPECIFIED CONVULSIONS: ICD-10-CM

## 2023-09-27 DIAGNOSIS — Z96.7 PRESENCE OF OTHER BONE AND TENDON IMPLANTS: Chronic | ICD-10-CM

## 2023-09-27 DIAGNOSIS — N84.0 POLYP OF CORPUS UTERI: ICD-10-CM

## 2023-09-27 DIAGNOSIS — Z95.5 PRESENCE OF CORONARY ANGIOPLASTY IMPLANT AND GRAFT: Chronic | ICD-10-CM

## 2023-09-27 DIAGNOSIS — Z29.9 ENCOUNTER FOR PROPHYLACTIC MEASURES, UNSPECIFIED: ICD-10-CM

## 2023-09-27 DIAGNOSIS — S62.102A FRACTURE OF UNSPECIFIED CARPAL BONE, LEFT WRIST, INITIAL ENCOUNTER FOR CLOSED FRACTURE: Chronic | ICD-10-CM

## 2023-09-27 DIAGNOSIS — N95.0 POSTMENOPAUSAL BLEEDING: ICD-10-CM

## 2023-09-27 DIAGNOSIS — Z01.818 ENCOUNTER FOR OTHER PREPROCEDURAL EXAMINATION: ICD-10-CM

## 2023-09-27 DIAGNOSIS — Z91.89 OTHER SPECIFIED PERSONAL RISK FACTORS, NOT ELSEWHERE CLASSIFIED: ICD-10-CM

## 2023-09-27 LAB
A1C WITH ESTIMATED AVERAGE GLUCOSE RESULT: 5.2 % — SIGNIFICANT CHANGE UP (ref 4–5.6)
ANION GAP SERPL CALC-SCNC: 13 MMOL/L — SIGNIFICANT CHANGE UP (ref 5–17)
ANTIBODY INTERPRETATION 2: SIGNIFICANT CHANGE UP
APTT BLD: 29.4 SEC — SIGNIFICANT CHANGE UP (ref 24.5–35.6)
BASOPHILS # BLD AUTO: 0.08 K/UL — SIGNIFICANT CHANGE UP (ref 0–0.2)
BASOPHILS NFR BLD AUTO: 0.9 % — SIGNIFICANT CHANGE UP (ref 0–2)
BLD GP AB SCN SERPL QL: SIGNIFICANT CHANGE UP
BUN SERPL-MCNC: 17.1 MG/DL — SIGNIFICANT CHANGE UP (ref 8–20)
CALCIUM SERPL-MCNC: 9.5 MG/DL — SIGNIFICANT CHANGE UP (ref 8.4–10.5)
CHLORIDE SERPL-SCNC: 98 MMOL/L — SIGNIFICANT CHANGE UP (ref 96–108)
CO2 SERPL-SCNC: 27 MMOL/L — SIGNIFICANT CHANGE UP (ref 22–29)
COMMENT - BLOOD BANK: SIGNIFICANT CHANGE UP
CREAT SERPL-MCNC: 0.67 MG/DL — SIGNIFICANT CHANGE UP (ref 0.5–1.3)
DIR ANTIGLOB POLYSPECIFIC INTERPRETATION: SIGNIFICANT CHANGE UP
EGFR: 91 ML/MIN/1.73M2 — SIGNIFICANT CHANGE UP
EOSINOPHIL # BLD AUTO: 0.16 K/UL — SIGNIFICANT CHANGE UP (ref 0–0.5)
EOSINOPHIL NFR BLD AUTO: 1.7 % — SIGNIFICANT CHANGE UP (ref 0–6)
ESTIMATED AVERAGE GLUCOSE: 103 MG/DL — SIGNIFICANT CHANGE UP (ref 68–114)
GLUCOSE SERPL-MCNC: 117 MG/DL — HIGH (ref 70–99)
HCT VFR BLD CALC: 38.2 % — SIGNIFICANT CHANGE UP (ref 34.5–45)
HGB BLD-MCNC: 11.4 G/DL — LOW (ref 11.5–15.5)
IMM GRANULOCYTES NFR BLD AUTO: 0.4 % — SIGNIFICANT CHANGE UP (ref 0–0.9)
INR BLD: 0.94 RATIO — SIGNIFICANT CHANGE UP (ref 0.85–1.18)
LYMPHOCYTES # BLD AUTO: 1.18 K/UL — SIGNIFICANT CHANGE UP (ref 1–3.3)
LYMPHOCYTES # BLD AUTO: 12.6 % — LOW (ref 13–44)
MCHC RBC-ENTMCNC: 29.8 GM/DL — LOW (ref 32–36)
MCHC RBC-ENTMCNC: 29.8 PG — SIGNIFICANT CHANGE UP (ref 27–34)
MCV RBC AUTO: 99.7 FL — SIGNIFICANT CHANGE UP (ref 80–100)
MONOCYTES # BLD AUTO: 0.73 K/UL — SIGNIFICANT CHANGE UP (ref 0–0.9)
MONOCYTES NFR BLD AUTO: 7.8 % — SIGNIFICANT CHANGE UP (ref 2–14)
NEUTROPHILS # BLD AUTO: 7.19 K/UL — SIGNIFICANT CHANGE UP (ref 1.8–7.4)
NEUTROPHILS NFR BLD AUTO: 76.6 % — SIGNIFICANT CHANGE UP (ref 43–77)
PLATELET # BLD AUTO: 452 K/UL — HIGH (ref 150–400)
POTASSIUM SERPL-MCNC: 4 MMOL/L — SIGNIFICANT CHANGE UP (ref 3.5–5.3)
POTASSIUM SERPL-SCNC: 4 MMOL/L — SIGNIFICANT CHANGE UP (ref 3.5–5.3)
PROTHROM AB SERPL-ACNC: 10.4 SEC — SIGNIFICANT CHANGE UP (ref 9.5–13)
RBC # BLD: 3.83 M/UL — SIGNIFICANT CHANGE UP (ref 3.8–5.2)
RBC # FLD: 14.2 % — SIGNIFICANT CHANGE UP (ref 10.3–14.5)
SODIUM SERPL-SCNC: 138 MMOL/L — SIGNIFICANT CHANGE UP (ref 135–145)
WBC # BLD: 9.38 K/UL — SIGNIFICANT CHANGE UP (ref 3.8–10.5)
WBC # FLD AUTO: 9.38 K/UL — SIGNIFICANT CHANGE UP (ref 3.8–10.5)

## 2023-09-27 PROCEDURE — G0463: CPT

## 2023-09-27 PROCEDURE — 86077 PHYS BLOOD BANK SERV XMATCH: CPT

## 2023-09-27 RX ORDER — SODIUM CHLORIDE 9 MG/ML
3 INJECTION INTRAMUSCULAR; INTRAVENOUS; SUBCUTANEOUS ONCE
Refills: 0 | Status: DISCONTINUED | OUTPATIENT
Start: 2023-10-05 | End: 2023-10-05

## 2023-09-27 RX ORDER — CEFAZOLIN SODIUM 1 G
2000 VIAL (EA) INJECTION ONCE
Refills: 0 | Status: DISCONTINUED | OUTPATIENT
Start: 2023-10-05 | End: 2023-10-05

## 2023-09-27 RX ORDER — CELECOXIB 200 MG/1
400 CAPSULE ORAL ONCE
Refills: 0 | Status: COMPLETED | OUTPATIENT
Start: 2023-10-05 | End: 2023-10-05

## 2023-09-27 RX ORDER — ACETAMINOPHEN 500 MG
975 TABLET ORAL ONCE
Refills: 0 | Status: COMPLETED | OUTPATIENT
Start: 2023-10-05 | End: 2023-10-05

## 2023-09-27 NOTE — H&P PST ADULT - PROBLEM SELECTOR PLAN 7
Caprini Score 8: High risk, pharmocologic VTE prophylaxis indicated for most patients (in the absence of contraindications)

## 2023-09-27 NOTE — H&P PST ADULT - NS ATTEND AMEND GEN_ALL_CORE FT
The patient was seen in the pre op area.  We reviewed the procedure in detail including positioning in the OR, drainage of urine from bladder, cervical dilation, hysteroscopy with removal of polyp.  We reviewed the risks including, but not limited to infection, bleeding which will require blood transfusion, uterine perforation that will require laparoscopy/laparotomy/hysterectomy.  All her questions were answered and she signed on informed consent.

## 2023-09-27 NOTE — H&P PST ADULT - GASTROINTESTINAL COMMENTS
Has colostomy bag due to diverticulitis episode 7/2023. She received 3 units of RBCs at [facility]. Vertical healing umbilical surgical incisional scar that has dressing, CDI over site. Unable to visualize. + abdominal colostomy bag that is draining and skin intact.

## 2023-09-27 NOTE — H&P PST ADULT - EYES
Port Dimensions-Y Axis In Cm: 0.2 Functional Status: 0 (fully active) Fractionation Number: 9 Daily Fractionated Dose (Optional- Include Units): 326.7 cgy Field Size (Applicator): 1.5 cm Detail Level: Detailed Fractions / Week Rx 3: 5 Shielding Size (Optional- Include Units): 1.2 cm Render Patient Eligibility And Selection In Note?: Yes Prescription Used: 1 Depth (Optional-Please Include Units): .43 cm HVL PERRL/EOMI/conjunctiva clear/normal Energy Output In Cgy/Min (Optional): 598 cgy/min Energy (Optional-Please Include Units): 70 kv Treatment Device Design After Initial Simulation Justification (Will Render If Bill For Treatment Devices = Yes): The patient is status post radiation simulation and is evaluated as to the use of additional devices for shielding and placement for radiation therapy. Computed Treatment Time In Min (Will Render The Same As Calculated Treatment Time If Left Blank): .45 min Abdiaziz For Simulation Without Treatment Device Design (Simple Simulation): No Time Dose Fractionation (Optional- Include Units If Applicable): 100 TDF Simple Simulation Preamble Text Will Be Included With Simple Simulations (.......... Indications): Simple simulation was performed today for the following reasons: Initial Radiation Treatment Planning (Will Render If Bill Simulation = Yes): The patient had a complete consultation regarding all applicable modalities for the treatment of their skin cancer and based on a variety of factors including the type of tumor, size, and location, the relevant medical history as well as local tissue factors, the functional status of the individual, the ability to perform necessary postoperative wound instructions and the need for simultaneous treatments as well as overall wound healing status, it was determined that the patient would begin radiation therapy treatment for skin cancer.  A full simulation and treatment device design was performed including the determination and formulation of appropriate simple and complex devices including lead shield of 0.762 mm thickness to form molded customized shielding to specifically correlate with the lesion size including treatment margin.  The custom lead shield is adequate to accommodate the appropriate applicator and provide adequate shielding around the treatment site.  The specific field applicator, shields, and devices both simple and complex as well as the specific patient setup is outlined below.  The patient was given a full consent for superficial radiation to both verbally and in writing and the full determination of patient's eligibility for treatment and selection is outlined on the patient eligibility and treatment selection form.  The specific superficial radiotherapy prescription was determined and was documented on the superficial radiotherapy prescription form.  A treatment calculation was also performed and documented on the treatment calculation form.  Based on the prescription, the patient was scheduled for a series of fractional treatments. Total Number Of Fractions Rx 4: 15 Dose Per Fractionation In Cgy (Optional): 326.7 Cumulative Dose In Cgy (Optional): 2940.3 Energy (Optional-Please Include Units): 50 kV Day Of The Week Treatment Administered: Friday Assessment: Appropriate reaction Additional Prescription Justification Text: If there is any interruption in treatment exceeding 5 days please see Decay and Dose Adjustment Calculation and complete treatment under Prescription 2, 3 or 4 as appropriate. Body Location Override (Optional): Right Inferior central forehead Dimensions-X Axis In Cm: 0.4 Total Dose (Optional-Please Include Units): 5227.2 cgy Patient Positioning: Supine Fractions / Week: 3 Total Number Of Fractions: 16 Custom Shielding Afterword Text Will Not Be Included With Simple Simulations (X X Y Cm............): port to correlate with the lesion size, including treatment margin. The custom lead shield is adequate to accommodate the appropriate applicator and provide adequate shielding around the treatment site. Additional shielding (as noted below) is used to protect sensitive, normal tissues. Treatment Time In Min (Optional): .45 Treatment Margins In Cm: 0 Custom Shielding Preamble Text Will Not Be Included With Simple Simulations (.......... X X Y Cm): A lead shield of 0.762 mm thickness is utilized to form a molded, custom shield with a Fractionation Number (Evaluation): 7 Intro Statement (Will Not Render If Left Blank): The patient is undergoing superficial radiation therapy for skin cancer and presents for weekly evaluation and management.  Per protocol and as documented on the flow sheet, the patient was questioned as to subjective redness, pruritus, pain, drainage, fatigue, or any other symptoms.  Objectively, the radiation area was evaluated with regards to erythema, atrophy, scale, crusting, erosion, ulceration, edema, purpura, tenderness, warmth, drainage, and any other findings.  The plan was extensively reviewed including the dose, and dosing schedule.  The simulation and clinical setup was also reviewed as was the external and any internal shields and based on this review the appropriateness and sufficiency of treatment was determined.

## 2023-09-27 NOTE — H&P PST ADULT - PROBLEM SELECTOR PLAN 3
Healing vertical umbilical scar with draining colostomy. Follows MD Derek Gutierrez every 2 weeks (in allscripts).

## 2023-09-27 NOTE — H&P PST ADULT - HISTORY OF PRESENT ILLNESS
79 y/o female with PMH of HTN, HDL, seizures  Reports that she had postmenopausal bleeding with thickened endometrium and cystic spaces on ultrasound. She had an endometrial polyp that was too large to remove in office, so she has opted for surgical intervention. She denies any vaginal bleeding or pain at this time. She is scheduled for   with MD Or on 10/5/23.  Medical Clearance Pending 79 y/o female with PMH of HTN, HDL, seizures  Reports that she had postmenopausal bleeding with thickened endometrium and cystic spaces on ultrasound. She had an endometrial polyp that was too large to remove in office, so she has opted for surgical intervention. She denies any vaginal bleeding or pain at this time. She is scheduled for   with MD Or on 10/5/23.  Medical Clearance Pending    15 years last seizure   cardiac stent 9/29 baby aspirin, 9/28 last dose of plavix     in hospital saw the thickening 7/ 2023    currently has a colostomy due to diverticulitis had 3 blood transfusions since then      79 y/o female with PMH of HTN, HDL, seizures, diverticulitis with rupture and current colostomy bag arrives from home to PST with complaints of post menopausal vaginal bleeding. She had abdominal pain in July 2023 went to [facility] ED, was told she had a diverticulitis perforation. She had surgery with colostomy bag and received 3 units of RBCs. While admitted she had vaginal bleeding, transvaginal US showed endometrial wall thickening with a large polyp. Once discharged from the hospital she had a hysteroscopy in the office with attempt at polyp removal, but was told the polyp was too large and to have removed in OR. Today she only has 2/10 pain in her right knee, and denies any vaginal bleeding. she is scheduled for Pelvic Exam Under Anesthesia Hysteroscopy Dilation and Curettage Polypectomy and Related Procedures with MD Or on 10/5/23.  Medical Clearance Pending. Cardiac Clearance done 9/26/23

## 2023-09-27 NOTE — H&P PST ADULT - NSICDXPASTSURGICALHX_GEN_ALL_CORE_FT
PAST SURGICAL HISTORY:  No significant past surgical history      PAST SURGICAL HISTORY:  Closed fracture of left wrist     H/O heart artery stent     H/O right knee surgery     History of appendectomy     History of cholecystectomy     History of creation of ostomy     History of left hip replacement     Steel plate in right arm

## 2023-09-27 NOTE — H&P PST ADULT - MUSCULOSKELETAL
normal/ROM intact/normal gait/strength 5/5 bilateral upper extremities/strength 5/5 bilateral lower extremities details… normal/ROM intact/decreased ROM/decreased ROM due to pain/normal gait/strength 5/5 bilateral upper extremities/abnormal gait

## 2023-09-27 NOTE — H&P PST ADULT - NSICDXPASTMEDICALHX_GEN_ALL_CORE_FT
PAST MEDICAL HISTORY:  High cholesterol     HTN (hypertension)     Seizures      PAST MEDICAL HISTORY:  At risk for sleep apnea     Coronary stent patent     High cholesterol     History of diverticulosis     HTN (hypertension)     Seizures

## 2023-09-27 NOTE — H&P PST ADULT - PROBLEM SELECTOR PLAN 1
She is scheduled for Pelvic Exam Under Anesthesia Hysteroscopy Dilation and Curettage Polypectomy and Related Procedures with MD Or on 10/5/23.  Medical Clearance Pending. Cardiac Clearance done 9/26/23

## 2023-09-27 NOTE — H&P PST ADULT - PROBLEM SELECTOR PLAN 4
Hx of cardiac stent placed 15+ years ago. Advised from cardiology: 9/28 last dose of plavix, then switch to baby aspirin daily until procedure.

## 2023-10-05 ENCOUNTER — APPOINTMENT (OUTPATIENT)
Dept: OBGYN | Facility: HOSPITAL | Age: 78
End: 2023-10-05

## 2023-10-05 ENCOUNTER — OUTPATIENT (OUTPATIENT)
Dept: INPATIENT UNIT | Facility: HOSPITAL | Age: 78
LOS: 1 days | End: 2023-10-05
Payer: MEDICARE

## 2023-10-05 ENCOUNTER — TRANSCRIPTION ENCOUNTER (OUTPATIENT)
Age: 78
End: 2023-10-05

## 2023-10-05 ENCOUNTER — RESULT REVIEW (OUTPATIENT)
Age: 78
End: 2023-10-05

## 2023-10-05 VITALS
DIASTOLIC BLOOD PRESSURE: 61 MMHG | OXYGEN SATURATION: 96 % | HEART RATE: 61 BPM | WEIGHT: 266.1 LBS | TEMPERATURE: 98 F | HEIGHT: 66 IN | SYSTOLIC BLOOD PRESSURE: 131 MMHG | RESPIRATION RATE: 18 BRPM

## 2023-10-05 VITALS
DIASTOLIC BLOOD PRESSURE: 56 MMHG | HEART RATE: 76 BPM | RESPIRATION RATE: 19 BRPM | TEMPERATURE: 99 F | SYSTOLIC BLOOD PRESSURE: 117 MMHG | OXYGEN SATURATION: 95 %

## 2023-10-05 DIAGNOSIS — N95.0 POSTMENOPAUSAL BLEEDING: ICD-10-CM

## 2023-10-05 DIAGNOSIS — Z96.7 PRESENCE OF OTHER BONE AND TENDON IMPLANTS: Chronic | ICD-10-CM

## 2023-10-05 DIAGNOSIS — Z87.81 PERSONAL HISTORY OF (HEALED) TRAUMATIC FRACTURE: Chronic | ICD-10-CM

## 2023-10-05 DIAGNOSIS — Z93.9 ARTIFICIAL OPENING STATUS, UNSPECIFIED: Chronic | ICD-10-CM

## 2023-10-05 DIAGNOSIS — Z90.49 ACQUIRED ABSENCE OF OTHER SPECIFIED PARTS OF DIGESTIVE TRACT: Chronic | ICD-10-CM

## 2023-10-05 DIAGNOSIS — Z96.642 PRESENCE OF LEFT ARTIFICIAL HIP JOINT: Chronic | ICD-10-CM

## 2023-10-05 DIAGNOSIS — Z98.890 OTHER SPECIFIED POSTPROCEDURAL STATES: Chronic | ICD-10-CM

## 2023-10-05 DIAGNOSIS — Z95.5 PRESENCE OF CORONARY ANGIOPLASTY IMPLANT AND GRAFT: Chronic | ICD-10-CM

## 2023-10-05 DIAGNOSIS — S62.102A FRACTURE OF UNSPECIFIED CARPAL BONE, LEFT WRIST, INITIAL ENCOUNTER FOR CLOSED FRACTURE: Chronic | ICD-10-CM

## 2023-10-05 DIAGNOSIS — R93.89 ABNORMAL FINDINGS ON DIAGNOSTIC IMAGING OF OTHER SPECIFIED BODY STRUCTURES: ICD-10-CM

## 2023-10-05 DIAGNOSIS — N84.0 POLYP OF CORPUS UTERI: ICD-10-CM

## 2023-10-05 LAB
BLD GP AB SCN SERPL QL: SIGNIFICANT CHANGE UP
DIR ANTIGLOB POLYSPECIFIC INTERPRETATION: SIGNIFICANT CHANGE UP

## 2023-10-05 PROCEDURE — 88305 TISSUE EXAM BY PATHOLOGIST: CPT | Mod: 26

## 2023-10-05 PROCEDURE — 86850 RBC ANTIBODY SCREEN: CPT

## 2023-10-05 PROCEDURE — 86901 BLOOD TYPING SEROLOGIC RH(D): CPT

## 2023-10-05 PROCEDURE — 86880 COOMBS TEST DIRECT: CPT

## 2023-10-05 PROCEDURE — 58558 HYSTEROSCOPY BIOPSY: CPT

## 2023-10-05 PROCEDURE — 86900 BLOOD TYPING SEROLOGIC ABO: CPT

## 2023-10-05 PROCEDURE — 88305 TISSUE EXAM BY PATHOLOGIST: CPT

## 2023-10-05 PROCEDURE — C9399: CPT

## 2023-10-05 PROCEDURE — C1782: CPT

## 2023-10-05 PROCEDURE — 36415 COLL VENOUS BLD VENIPUNCTURE: CPT

## 2023-10-05 DEVICE — AVETA FLEX RESECTING DEVICE 2.9MM: Type: IMPLANTABLE DEVICE | Status: FUNCTIONAL

## 2023-10-05 RX ORDER — FENTANYL CITRATE 50 UG/ML
25 INJECTION INTRAVENOUS
Refills: 0 | Status: DISCONTINUED | OUTPATIENT
Start: 2023-10-05 | End: 2023-10-05

## 2023-10-05 RX ORDER — CELECOXIB 200 MG/1
1 CAPSULE ORAL
Qty: 0 | Refills: 0 | DISCHARGE
Start: 2023-10-05

## 2023-10-05 RX ORDER — ACETAMINOPHEN 500 MG
3 TABLET ORAL
Qty: 0 | Refills: 0 | DISCHARGE
Start: 2023-10-05

## 2023-10-05 RX ADMIN — Medication 975 MILLIGRAM(S): at 06:32

## 2023-10-05 RX ADMIN — CELECOXIB 400 MILLIGRAM(S): 200 CAPSULE ORAL at 06:32

## 2023-10-05 NOTE — ASU DISCHARGE PLAN (ADULT/PEDIATRIC) - NS MD DC FALL RISK RISK
For information on Fall & Injury Prevention, visit: [email or web address] OR  [email or web address] OR  https://www.cdc.gov/steadi/patient.html

## 2023-10-05 NOTE — ASU DISCHARGE PLAN (ADULT/PEDIATRIC) - ACTIVITY LEVEL
Nothing per rectum/Nothing per vagina/No tub baths Nothing per rectum/Nothing per vagina/No tub baths/No douching/No tampons/No intercourse

## 2023-10-05 NOTE — BRIEF OPERATIVE NOTE - OPERATION/FINDINGS
Multiple endometrial polyps filling the endometrial cavity.   endometrial lining otherwise appears atrophic  Bilateral ostia identified.

## 2023-10-05 NOTE — ASU DISCHARGE PLAN (ADULT/PEDIATRIC) - DRIVING DURATION DAY(S)
not in next 24hrs after anesthesia or while reliant on pain medications due to decreased reaction time

## 2023-10-05 NOTE — ASU PREOP CHECKLIST - SURGICAL CONSENT
A 6 year old child who has outgrown the forward facing, internal harness system shall be restrained in a belt positioning child booster seat.  If you have an active [facility] account, please look for your well child questionnaire to come to your [facility] account before your next well child visit.   done

## 2023-10-05 NOTE — ASU PREOP CHECKLIST - TAMPON REMOVED
Consult Note   Assessment:  Depression with suicide ideation  Anxiety  Chronic back pain-history of spinal surgery x2, chronic left leg weakness secondary to nerve damage per patient  Hep C- recently diagnosed, will see gastroenterologist at the end of this month  History of IV drug use , on methadone, UDS positive for methadone  Bradycardia, chronic -remains asymptomatic    Plan:  Admitted in the hospital for psych evaluation treatment  Continue all medications  Tylenol or Motrin p o  P r n  For chronic back pain   ______________________________________________________________________    Consulting Service: Psychiatry    Chief Complaint:  Depression with suicide ideation    HPI: Jhonny Neil,  a 36 y o  male with history of depression, anxiety is admitted in the hospital for worsening depression with suicide ideation  Patient denies any plans  Patient denies any auditory or visual visual hallucinations, no homicide ideation  Please refer to psych evaluation for more information  History of chronic back pain, status post spinal surgery x2 with nerve damage on left lower extremity  Patient mentions that he has walking difficulty times  His recently diagnosed with hep C, has appointment with gastroenterologist at the end of July  History of IV drug abuse-last use probably month ago  On methadone per [facility]  No change in physical health recently  Denies any chest pain, difficulty breathing, fever, chills, or any abdominal pain    Review of Systems:  General: negative  Cardiovascular: no chest pain or dyspnea on exertion  Respiratory: no cough, shortness of breath, or wheezing  Gastrointestinal: no abdominal pain, change in bowel habits, or black or bloody stools  Genitourinary ROS: no dysuria, trouble voiding, or hematuria  Musculoskeletal ROS:  Positive for chronic back pain, chronic left leg weakness, at times unstable gait  Neurological ROS: no TIA or stroke symptoms  Hematological and Lymphatic ROS: negative  Dermatological ROS: negative  Psychological ROS: positive for - anxiety, depression, sleep disturbances, suicidal ideation and insomnia, nervousness  Ophthalmic ROS: negative  ENT ROS: negative    Past Medical History:  Past Medical History:   Diagnosis Date    Allergic     Anxiety     Depression     Spinal stenosis        Past Surgical History:  Past Surgical History:   Procedure Laterality Date    BACK SURGERY      FRACTURE SURGERY      left foot reconstruted     SPINE SURGERY      hernated disc        Social History:  Social History     Substance and Sexual Activity   Alcohol Use Never    Frequency: Never    Binge frequency: Never     Social History     Substance and Sexual Activity   Drug Use Not Currently     Social History     Tobacco Use   Smoking Status Light Tobacco Smoker    Packs/day: 0 25    Last attempt to quit: 2017    Years since quittin 8   Smokeless Tobacco Current User       Family History:  Family History   Problem Relation Age of Onset    Hypertension Mother     Hyperlipidemia Father     Heart disease Father     Depression Maternal Grandmother     Hypertension Maternal Grandmother     Squamous cell carcinoma Maternal Grandfather     Alcohol abuse Maternal Grandfather     Alzheimer's disease Paternal Grandmother     Dementia Paternal Grandmother     Alcohol abuse Paternal Uncle        Allergies:  No Known Allergies    Medications:  Current Facility-Administered Medications   Medication Dose Route Frequency    acetaminophen (TYLENOL) tablet 650 mg  650 mg Oral Q6H PRN    acetaminophen (TYLENOL) tablet 650 mg  650 mg Oral Q4H PRN    aluminum-magnesium hydroxide-simethicone (MYLANTA) 200-200-20 mg/5 mL oral suspension 30 mL  30 mL Oral Q4H PRN    haloperidol lactate (HALDOL) injection 5 mg  5 mg Intramuscular Q6H PRN    hydrOXYzine HCL (ATARAX) tablet 50 mg  50 mg Oral Q4H PRN    ibuprofen (MOTRIN) tablet 800 mg  800 mg Oral Q8H PRN    LORazepam (ATIVAN) 2 mg/mL injection 2 mg  2 mg Intramuscular Q4H PRN    magnesium hydroxide (MILK OF MAGNESIA) 400 mg/5 mL oral suspension 30 mL  30 mL Oral Daily PRN    nicotine (NICODERM CQ) 21 mg/24 hr TD 24 hr patch 1 patch  1 patch Transdermal Daily    OLANZapine (ZyPREXA) IM injection 5 mg  5 mg Intramuscular Q3H PRN    risperiDONE (RisperDAL M-TABS) dispersible tablet 2 mg  2 mg Oral Q3H PRN    traZODone (DESYREL) tablet 50 mg  50 mg Oral HS PRN       Vitals:    07/17/19 0504 07/17/19 0744 07/17/19 0815 07/17/19 0918   BP: 107/62 103/53  100/50   BP Location: Right arm Left arm  Right arm   Pulse: (!) 39 (!) 43 (!) 39 (!) 52   Resp: 16 16 18   Temp: (!) 95 4 °F (35 2 °C) (!) 96 7 °F (35 9 °C)  97 8 °F (36 6 °C)   TempSrc: Tympanic Tympanic  Tympanic   SpO2: 98%      Weight:       Height:           I/Os:  No intake/output data recorded  No intake/output data recorded      Lab Results and Cultures:   CBC with diff:   Lab Results   Component Value Date    WBC 7 39 07/16/2019    HGB 11 0 (L) 07/16/2019    HCT 32 7 (L) 07/16/2019    MCV 87 07/16/2019     07/16/2019    MCH 29 3 07/16/2019    MCHC 33 6 07/16/2019    RDW 13 6 07/16/2019    MPV 11 0 07/16/2019    NRBC 0 07/16/2019   ,   BMP/CMP:  Lab Results   Component Value Date    K 3 8 07/16/2019     07/16/2019    CO2 30 07/16/2019    BUN 15 07/16/2019    CREATININE 1 29 07/16/2019    CALCIUM 8 9 07/16/2019    AST 78 (H) 07/16/2019    ALT 58 07/16/2019    ALKPHOS 69 07/16/2019    EGFR 74 07/16/2019   ,   Lipid Panel: No results found for: CHOL,   Coags: No results found for: PT, PTT, INR,     Blood Culture: No results found for: BLOODCX,   Urinalysis: No results found for: COLORU, CLARITYU, SPECGRAV, PHUR, LEUKOCYTESUR, NITRITE, PROTEINUA, GLUCOSEU, KETONESU, BILIRUBINUR, BLOODU,   Urine Culture: No results found for: URINECX,   Wound Culure: No results found for: WOUNDCULT      Physical Exam:    General appearance: alert and oriented, in no acute distress  Head: Normocephalic, without obvious abnormality, atraumatic  Eyes: conjunctivae/corneas clear  PERRL, EOM's intact  Fundi benign  Throat: lips, mucosa, and tongue normal; teeth and gums normal  Neck: no adenopathy, no carotid bruit, no JVD, supple, symmetrical, trachea midline and thyroid not enlarged, symmetric, no tenderness/mass/nodules  Back: symmetric, no curvature  ROM normal  No CVA tenderness    Lungs: clear to auscultation bilaterally  Chest wall: no tenderness  Heart: regular rate and rhythm, S1, S2 normal, no murmur, click, rub or gallop  Abdomen: soft, non-tender; bowel sounds normal; no masses,  no organomegaly  Genitalia: deferred  Rectal: deferred  Extremities: extremities normal, warm and well-perfused; no cyanosis, clubbing, or edema  Skin: Skin color, texture, turgor normal  No rashes or lesions  Neurologic: Grossly normal      Abby Angelo PA-C  7/17/2019 n/a

## 2023-10-05 NOTE — ASU DISCHARGE PLAN (ADULT/PEDIATRIC) - CARE PROVIDER_API CALL
Nidia Crow  [address] [place] [id number]  Phone: [phone]  Fax: [phone]  Established Patient  Follow Up Time: 2 weeks

## 2023-10-05 NOTE — BRIEF OPERATIVE NOTE - NSICDXBRIEFPROCEDURE_GEN_ALL_CORE_FT
PROCEDURES:  Hysteroscopic polypectomy of uterus with dilation and curettage 05-Oct-2023 08:44:11  OrNidia

## 2023-10-20 ENCOUNTER — APPOINTMENT (OUTPATIENT)
Dept: OBGYN | Facility: CLINIC | Age: 78
End: 2023-10-20
Payer: MEDICARE

## 2023-10-20 DIAGNOSIS — Z09 ENCOUNTER FOR FOLLOW-UP EXAMINATION AFTER COMPLETED TREATMENT FOR CONDITIONS OTHER THAN MALIGNANT NEOPLASM: ICD-10-CM

## 2023-10-20 DIAGNOSIS — Z71.2 PERSON CONSULTING FOR EXPLANATION OF EXAMINATION OR TEST FINDINGS: ICD-10-CM

## 2023-10-20 PROBLEM — I25.10 ATHEROSCLEROTIC HEART DISEASE OF NATIVE CORONARY ARTERY WITHOUT ANGINA PECTORIS: Chronic | Status: ACTIVE | Noted: 2022-09-07

## 2023-10-20 PROBLEM — E78.5 HYPERLIPIDEMIA, UNSPECIFIED: Chronic | Status: ACTIVE | Noted: 2022-09-07

## 2023-10-20 PROBLEM — Z87.898 PERSONAL HISTORY OF OTHER SPECIFIED CONDITIONS: Chronic | Status: ACTIVE | Noted: 2022-09-07

## 2023-10-20 PROBLEM — I10 ESSENTIAL (PRIMARY) HYPERTENSION: Chronic | Status: ACTIVE | Noted: 2022-09-07

## 2023-10-20 PROBLEM — F41.9 ANXIETY DISORDER, UNSPECIFIED: Chronic | Status: ACTIVE | Noted: 2022-09-07

## 2023-10-20 PROCEDURE — 99213 OFFICE O/P EST LOW 20 MIN: CPT

## 2023-10-20 RX ORDER — METOPROLOL TARTRATE 50 MG
1 TABLET ORAL
Refills: 0 | DISCHARGE

## 2023-10-20 RX ORDER — CLOPIDOGREL BISULFATE 75 MG/1
1 TABLET, FILM COATED ORAL
Refills: 0 | DISCHARGE

## 2023-10-20 RX ORDER — ASCORBIC ACID 60 MG
0 TABLET,CHEWABLE ORAL
Refills: 0 | DISCHARGE

## 2023-10-20 RX ORDER — ASPIRIN/CALCIUM CARB/MAGNESIUM 324 MG
1 TABLET ORAL
Refills: 0 | DISCHARGE

## 2023-10-20 RX ORDER — CARBAMAZEPINE 200 MG
1 TABLET ORAL
Refills: 0 | DISCHARGE

## 2023-10-20 RX ORDER — ROSUVASTATIN CALCIUM 5 MG/1
1 TABLET ORAL
Refills: 0 | DISCHARGE

## 2023-10-20 RX ORDER — LISINOPRIL 2.5 MG/1
1 TABLET ORAL
Refills: 0 | DISCHARGE

## 2023-10-20 RX ORDER — HYDROCHLOROTHIAZIDE 25 MG
1 TABLET ORAL
Refills: 0 | DISCHARGE

## 2023-11-13 PROBLEM — R56.9 UNSPECIFIED CONVULSIONS: Chronic | Status: ACTIVE | Noted: 2023-09-27

## 2023-11-13 PROBLEM — Z91.89 OTHER SPECIFIED PERSONAL RISK FACTORS, NOT ELSEWHERE CLASSIFIED: Chronic | Status: ACTIVE | Noted: 2023-09-27

## 2023-11-13 PROBLEM — Z95.5 PRESENCE OF CORONARY ANGIOPLASTY IMPLANT AND GRAFT: Chronic | Status: ACTIVE | Noted: 2023-09-27

## 2023-11-13 PROBLEM — I10 ESSENTIAL (PRIMARY) HYPERTENSION: Chronic | Status: ACTIVE | Noted: 2023-09-27

## 2023-11-13 PROBLEM — E78.00 PURE HYPERCHOLESTEROLEMIA, UNSPECIFIED: Chronic | Status: ACTIVE | Noted: 2023-09-27

## 2023-11-13 PROBLEM — Z87.19 PERSONAL HISTORY OF OTHER DISEASES OF THE DIGESTIVE SYSTEM: Chronic | Status: ACTIVE | Noted: 2023-09-27

## 2023-11-16 ENCOUNTER — TRANSCRIPTION ENCOUNTER (OUTPATIENT)
Age: 78
End: 2023-11-16

## 2023-11-28 NOTE — PROCEDURE NOTE - NSPROCNAME_GEN_A_CORE
FOLLOW UP NOTE:     CHIEF COMPLAINT: back pain    INTERVAL HISTORY OF PRESENT ILLNESS: Gayla Kaplan is a 77 y.o. female who presents for right L4-L5, L5-S1 lumbar facet radiofrequency ablation. The patient denies of any significant changes in her health since her last appointment. The patient also denies of any changes in the character of her pain since her last appointment.     ROS:  Review of Systems   Constitutional:  Negative for chills and fever.   HENT:  Negative for sore throat.    Eyes:  Negative for visual disturbance.   Respiratory:  Negative for shortness of breath.    Cardiovascular:  Negative for chest pain.   Gastrointestinal:  Negative for nausea and vomiting.   Genitourinary:  Negative for difficulty urinating.   Musculoskeletal:  Positive for back pain.   Skin:  Negative for rash.   Allergic/Immunologic: Negative for immunocompromised state.   Neurological:  Negative for syncope.   Hematological:  Does not bruise/bleed easily.   Psychiatric/Behavioral:  Negative for suicidal ideas.         MEDICAL, SURGICAL, FAMILY, SOCIAL HX: reviewed    MEDICATIONS/ALLERGIES: reviewed    PHYSICAL EXAM:    VITALS: Vitals reviewed.   Vitals:    11/28/23 0843   BP: (!) 141/74   Pulse: (!) 55   Resp: (!) 22   Temp: 97.7 °F (36.5 °C)   SpO2: 97%   Weight: 66.2 kg (146 lb)   Height: 5' (1.524 m)       Physical Exam  Vitals and nursing note reviewed.   Constitutional:       Appearance: Normal appearance. She is not toxic-appearing or diaphoretic.   HENT:      Head: Normocephalic and atraumatic.   Eyes:      General:         Right eye: No discharge.         Left eye: No discharge.      Extraocular Movements: Extraocular movements intact.      Conjunctiva/sclera: Conjunctivae normal.   Cardiovascular:      Rate and Rhythm: Normal rate.   Pulmonary:      Effort: Pulmonary effort is normal. No respiratory distress.      Breath sounds: Normal breath sounds.   Abdominal:      Palpations: Abdomen is soft.   Skin:      General: Skin is warm and dry.      Findings: No rash.   Neurological:      Mental Status: She is alert and oriented to person, place, and time.   Psychiatric:         Mood and Affect: Mood and affect normal.         Cognition and Memory: Memory normal.         Judgment: Judgment normal.          EXTREMITIES:    Gen: No cyanosis, edema, varicosities, or tenderness to palpation BLE   Skin: Warm, pink, dry, no rashes, no lesions BLE   Strength: 5/5 motor strength BLE   ROM: hips, knees and ankles without pain or instability.     NEUROLOGICAL:    Gen: No clonus or spasticity.   Gait: Normal without antalgic lean   DTR's: 2+ in bilateral patellar, and ankle   BABINSKI: Absent bilaterally  Sensory: Intact to light touch and proprioception BLE    ASSESSMENT: Gayla Kaplan is a 77 y.o. female who presents for right L4-L5, L5-S1 lumbar facet radiofrequency ablation.     PLAN:  Proceed with right L4-L5, L5-S1 lumbar facet radiofrequency ablation as previously discussed.    This patient has been cleared for surgery in an ambulatory surgical facility    ASA 3,  Mallampatti Score 3  No history of anesthetic complications  Plan for RN IV sedation    Allen Benjamin MD  Pain Management            Gastric Intubation/Gastric Lavage

## 2023-11-30 ENCOUNTER — APPOINTMENT (OUTPATIENT)
Dept: ORTHOPEDIC SURGERY | Facility: CLINIC | Age: 78
End: 2023-11-30
Payer: MEDICARE

## 2023-11-30 VITALS — BODY MASS INDEX: 42.43 KG/M2 | HEIGHT: 66 IN | WEIGHT: 264 LBS

## 2023-11-30 DIAGNOSIS — M17.11 UNILATERAL PRIMARY OSTEOARTHRITIS, RIGHT KNEE: ICD-10-CM

## 2023-11-30 DIAGNOSIS — Z87.19 PERSONAL HISTORY OF OTHER DISEASES OF THE DIGESTIVE SYSTEM: ICD-10-CM

## 2023-11-30 PROCEDURE — 20610 DRAIN/INJ JOINT/BURSA W/O US: CPT | Mod: RT

## 2023-11-30 PROCEDURE — 99213 OFFICE O/P EST LOW 20 MIN: CPT | Mod: 25

## 2025-01-30 NOTE — PATIENT PROFILE ADULT - NSPROPTRIGHTBILLOFRIGHTS_GEN_A_NUR
[Chaperone Present] : A chaperone was present in the examining room during all aspects of the physical examination [11448] : A chaperone was present during the pelvic exam. [Normal] : Bimanual Exam: Normal [FreeTextEntry2] : Angelina Zamorano PA-C patient

## 2025-01-31 NOTE — DISCHARGE NOTE NURSING/CASE MANAGEMENT/SOCIAL WORK - NSDCPETBCESMAN_GEN_ALL_CORE
[facility]  Hospital Medicine  Progress Note    Patient Name: Vy Hussein  MRN: [id number]  Patient Class: IP- Swing   Admission Date: 12/24/2024  Length of Stay: 38 days  Attending Physician: Elizabeth Castro MD  Primary Care Provider: Flora Brennan MD        Subjective     Principal Problem:Muscular weakness        HPI:  No notes on file    Overview/Hospital Course:  12/27/24 - up in chair. No complaints voiced.    12/30/24 - doing well today. No complaints voiced.    1/3/25 - has a cough this AM. Has cough syrup ordered. Will continue present treatment.    1/6/25 - doing well. To have staples removed today.    1/10/25 - doing well. Will continue present treatment.    1/13/25 - doing well. Will continue present treatment.    1/14/25 - had 3 diarrheal stools last night. Orders revised.    1/15/25 - still with diarrhea. Orders revised.    1/17/25 - feels better. Hemoglobin has dropped. Orders revised.    1/20/25 - doing well. No complaints.    1/24/25 - pt. Is doing well. Will continue present treatment.    1/27/25 - doing well no complaints voiced.    1/31/25 - pt. Is going home tomorrow. Has done well. Will continue therapy at home.    Interval History: to go home tomorrow.    Review of Systems  Objective:     Vital Signs (Most Recent):  Temp: 97.5 °F (36.4 °C) (01/31/25 0748)  Pulse: 84 (01/31/25 0748)  Resp: 18 (01/31/25 0748)  BP: 110/66 (01/31/25 0748)  SpO2: 95 % (01/31/25 0748) Vital Signs (24h Range):  Temp:  [97.5 °F (36.4 °C)-98.5 °F (36.9 °C)] 97.5 °F (36.4 °C)  Pulse:  [68-84] 84  Resp:  [18-20] 18  SpO2:  [95 %-98 %] 95 %  BP: (110-143)/(66-81) 110/66     Weight: 39.7 kg (87 lb 8.4 oz)  Body mass index is 14.13 kg/m².    Intake/Output Summary (Last 24 hours) at 1/31/2025 0845  Last data filed at 1/31/2025 0756  Gross per 24 hour   Intake 1100 ml   Output --   Net 1100 ml         Physical Exam        Significant Labs: All pertinent labs within the past 24  hours have been reviewed.    Significant Imaging: I have reviewed all pertinent imaging results/findings within the past 24 hours.    Assessment and Plan     No notes have been filed under this hospital service.  Service: Hospital Medicine    VTE Risk Mitigation (From admission, onward)           Ordered     apixaban tablet 2.5 mg  2 times daily         12/25/24 1056     IP VTE HIGH RISK PATIENT  Once         12/24/24 1544                    Discharge Planning   REESE: 2/1/2025     Code Status: Prior   Medical Readiness for Discharge Date:   Discharge Plan A: Home, Home Health                Please place Justification for DME        Ema Chamorro MD  Department of Hospital Medicine   [facility]     If you are a smoker, it is important for your health to stop smoking. Please be aware that second hand smoke is also harmful.

## 2025-03-28 ENCOUNTER — APPOINTMENT (OUTPATIENT)
Dept: PULMONOLOGY | Facility: CLINIC | Age: 80
End: 2025-03-28
Payer: MEDICARE

## 2025-03-28 PROCEDURE — 94729 DIFFUSING CAPACITY: CPT | Mod: TC

## 2025-03-28 PROCEDURE — 94727 GAS DIL/WSHOT DETER LNG VOL: CPT | Mod: TC

## 2025-03-28 PROCEDURE — 94060 EVALUATION OF WHEEZING: CPT | Mod: TC

## 2025-07-29 ENCOUNTER — OUTPATIENT (OUTPATIENT)
Dept: OUTPATIENT SERVICES | Facility: HOSPITAL | Age: 80
LOS: 1 days | End: 2025-07-29
Payer: MEDICARE

## 2025-07-29 ENCOUNTER — APPOINTMENT (OUTPATIENT)
Dept: GASTROENTEROLOGY | Facility: HOSPITAL | Age: 80
End: 2025-07-29

## 2025-07-29 ENCOUNTER — RESULT REVIEW (OUTPATIENT)
Age: 80
End: 2025-07-29

## 2025-07-29 ENCOUNTER — TRANSCRIPTION ENCOUNTER (OUTPATIENT)
Age: 80
End: 2025-07-29

## 2025-07-29 VITALS
SYSTOLIC BLOOD PRESSURE: 115 MMHG | TEMPERATURE: 97 F | RESPIRATION RATE: 20 BRPM | OXYGEN SATURATION: 99 % | DIASTOLIC BLOOD PRESSURE: 55 MMHG | HEART RATE: 67 BPM

## 2025-07-29 VITALS
WEIGHT: 270.07 LBS | HEIGHT: 66 IN | HEART RATE: 74 BPM | RESPIRATION RATE: 15 BRPM | SYSTOLIC BLOOD PRESSURE: 137 MMHG | DIASTOLIC BLOOD PRESSURE: 61 MMHG | OXYGEN SATURATION: 97 % | TEMPERATURE: 97 F

## 2025-07-29 DIAGNOSIS — Z90.49 ACQUIRED ABSENCE OF OTHER SPECIFIED PARTS OF DIGESTIVE TRACT: Chronic | ICD-10-CM

## 2025-07-29 DIAGNOSIS — Z96.7 PRESENCE OF OTHER BONE AND TENDON IMPLANTS: Chronic | ICD-10-CM

## 2025-07-29 DIAGNOSIS — K92 OTHER DISEASES OF DIGESTIVE SYSTEM: ICD-10-CM

## 2025-07-29 DIAGNOSIS — Z98.890 OTHER SPECIFIED POSTPROCEDURAL STATES: Chronic | ICD-10-CM

## 2025-07-29 DIAGNOSIS — S62.102A FRACTURE OF UNSPECIFIED CARPAL BONE, LEFT WRIST, INITIAL ENCOUNTER FOR CLOSED FRACTURE: Chronic | ICD-10-CM

## 2025-07-29 DIAGNOSIS — Z95.5 PRESENCE OF CORONARY ANGIOPLASTY IMPLANT AND GRAFT: Chronic | ICD-10-CM

## 2025-07-29 DIAGNOSIS — Z96.642 PRESENCE OF LEFT ARTIFICIAL HIP JOINT: Chronic | ICD-10-CM

## 2025-07-29 DIAGNOSIS — Z93.9 ARTIFICIAL OPENING STATUS, UNSPECIFIED: Chronic | ICD-10-CM

## 2025-07-29 DIAGNOSIS — Z87.81 PERSONAL HISTORY OF (HEALED) TRAUMATIC FRACTURE: Chronic | ICD-10-CM

## 2025-07-29 PROCEDURE — 88305 TISSUE EXAM BY PATHOLOGIST: CPT

## 2025-07-29 PROCEDURE — 88307 TISSUE EXAM BY PATHOLOGIST: CPT | Mod: 26

## 2025-07-29 PROCEDURE — 45390 COLONOSCOPY W/RESECTION: CPT | Mod: 59

## 2025-07-29 PROCEDURE — 45381 COLONOSCOPY SUBMUCOUS NJX: CPT | Mod: PT

## 2025-07-29 PROCEDURE — 45388 COLONOSCOPY W/ABLATION: CPT | Mod: 59

## 2025-07-29 PROCEDURE — C1889: CPT

## 2025-07-29 PROCEDURE — 88307 TISSUE EXAM BY PATHOLOGIST: CPT

## 2025-07-29 PROCEDURE — 88305 TISSUE EXAM BY PATHOLOGIST: CPT | Mod: 26

## 2025-07-29 PROCEDURE — 45385 COLONOSCOPY W/LESION REMOVAL: CPT | Mod: 59

## 2025-07-29 PROCEDURE — 45381 COLONOSCOPY SUBMUCOUS NJX: CPT | Mod: 59

## 2025-07-29 PROCEDURE — 45385 COLONOSCOPY W/LESION REMOVAL: CPT | Mod: PT

## 2025-07-29 DEVICE — NAIL OSTEO 1.5X16MM STRL: Type: IMPLANTABLE DEVICE | Status: FUNCTIONAL

## 2025-07-29 DEVICE — CLIP MANTIS 235CMX2.8MM: Type: IMPLANTABLE DEVICE | Status: FUNCTIONAL

## 2025-07-29 DEVICE — IMPLANTABLE DEVICE: Type: IMPLANTABLE DEVICE | Status: FUNCTIONAL

## 2025-07-29 DEVICE — GEL PURASTAT 3ML: Type: IMPLANTABLE DEVICE | Status: FUNCTIONAL

## 2025-07-29 DEVICE — CLIP HEMO INSTINCT PLUS ENDOSCOPIC: Type: IMPLANTABLE DEVICE | Status: FUNCTIONAL

## 2025-08-01 LAB — SURGICAL PATHOLOGY STUDY: SIGNIFICANT CHANGE UP

## (undated) DEVICE — PACK IV START WITH CHG

## (undated) DEVICE — PACK LITHOTOMY

## (undated) DEVICE — SYR IV FLUSH SALINE 10ML 30/TY

## (undated) DEVICE — AVETA CORAL HYSTEROSCOPE 4.6MM DISP

## (undated) DEVICE — TUBING ALARIS PUMP MODULE NON-DEHP

## (undated) DEVICE — DRAPE U 47X51" LF STERILE

## (undated) DEVICE — DRSG CURITY GAUZE SPONGE 4 X 4" 12-PLY NON-STERILE

## (undated) DEVICE — DRAPE INSTRUMENT POUCH 6.75" X 11"

## (undated) DEVICE — FORCEP RADIAL JAW 4 HOT DISP

## (undated) DEVICE — GLV 7.5 PROTEXIS (WHITE)

## (undated) DEVICE — GLV 6.5 PROTEXIS (WHITE)

## (undated) DEVICE — DRSG DERMABOND PRINEO 22CM

## (undated) DEVICE — WARMING BLANKET UPPER ADULT

## (undated) DEVICE — GLV 7.5 PROTEXIS ORTHO (BROWN)

## (undated) DEVICE — SUT VICRYL 2-0 27" CP-1 UNDYED

## (undated) DEVICE — DRAPE TOWEL BLUE 17" X 24"

## (undated) DEVICE — SOL IRR BAG NS 0.9% 3000ML

## (undated) DEVICE — URETERAL CATH RED RUBBER 14FR (GREEN)

## (undated) DEVICE — POSITIONER FOAM BUMP FLAT TOP 10X6X4" LRG

## (undated) DEVICE — SUT PDS II 1 48" TP-1

## (undated) DEVICE — DRAPE 3/4 SHEET 52X76"

## (undated) DEVICE — ELCTR GROUNDING PAD ADULT COVIDIEN

## (undated) DEVICE — DRAPE TOWEL BLUE STICKY

## (undated) DEVICE — AVETA FLUID MANAGEMENT ACCESSORY

## (undated) DEVICE — GLV 7.5 PROTEXIS (BLUE)

## (undated) DEVICE — RETRIEVER ROTH NET PLATINUM-UNIVERSAL

## (undated) DEVICE — PROBE ERBEJET FLEXIBLE 1.3MMX2.2M

## (undated) DEVICE — PREP DYNA-HEX CHG 4% 4OZ BOTTLE (BACTOSHIELD)

## (undated) DEVICE — DRAPE SURGICAL #1010

## (undated) DEVICE — AVETA FLUID MANAGEMENT ACCESSORY W CAP

## (undated) DEVICE — RETAINER VICERA FISH LG

## (undated) DEVICE — VENODYNE/SCD SLEEVE CALF MEDIUM

## (undated) DEVICE — SOL IRR BAG H2O 1000ML

## (undated) DEVICE — SYR SLIP 10CC

## (undated) DEVICE — FORCEP RADIAL JAW 4 W NDL 2.4MM 2.8MM 240CM ORANGE DISP

## (undated) DEVICE — SENSOR O2 FINGER ADULT

## (undated) DEVICE — ZIMMER PULSAVAC PLUS FAN KIT

## (undated) DEVICE — SUT PDO 2 1/2 CIRCLE 40MM NDL 45CM

## (undated) DEVICE — NDL HYPO SAFE 22G X 1.5" (BLACK)

## (undated) DEVICE — ELCTR AQUAMANTYS BIPOLAR SEALER 6.0

## (undated) DEVICE — PREP SCRUB BRUSH W CHG 4%

## (undated) DEVICE — DRAPE 1/2 SHEET 40X57"

## (undated) DEVICE — SYR LUER LOK 20CC

## (undated) DEVICE — SUT VICRYL 3-0 27" SH UNDYED

## (undated) DEVICE — SUT MONOCRYL 4-0 27" PS-2 UNDYED

## (undated) DEVICE — SUT VICRYL 0 36" CT-1 UNDYED

## (undated) DEVICE — CATH IV SAFE BC 22G X 1" (BLUE)

## (undated) DEVICE — FRA-ESU BOVIE FORCE TRIAD T6D04777DX: Type: DURABLE MEDICAL EQUIPMENT

## (undated) DEVICE — PACK GENERAL LAPAROSCOPY

## (undated) DEVICE — SUT HEWSON RETRIEVER

## (undated) DEVICE — DENTURE CUP PINK

## (undated) DEVICE — DRAPE HIP W POUCHES 87X115X134"

## (undated) DEVICE — GOWN IMPERV XL

## (undated) DEVICE — SUT VICRYL 0 27" CT-1 UNDYED

## (undated) DEVICE — SUT ETHIBOND 5 4-30" V-37

## (undated) DEVICE — DRAPE 3/4 SHEET W REINFORCEMENT 56X77"

## (undated) DEVICE — LAP PAD W RING 18 X 18"

## (undated) DEVICE — MARKING PEN W RULER

## (undated) DEVICE — SNARE POLYP HEXAGONAL MED 27X2.4X240

## (undated) DEVICE — ATTACH DISTAL DISP

## (undated) DEVICE — PACK TOTAL JOINT

## (undated) DEVICE — DRSG TELFA 3 X 4

## (undated) DEVICE — BLADE SURGICAL #10 STAINLESS

## (undated) DEVICE — ATTACHMENT DISTAL 4X11.35MM

## (undated) DEVICE — SUT VICRYL 2-0 27" CT-1 UNDYED

## (undated) DEVICE — GOWN XL

## (undated) DEVICE — BLADE SURGICAL #15 CARBON

## (undated) DEVICE — PACK BASIC

## (undated) DEVICE — MEDICATION LABELS W MARKER

## (undated) DEVICE — CATH SPRAY 1.8X2200MM BLU

## (undated) DEVICE — SOL BAG NS 0.9% 1000ML

## (undated) DEVICE — SAW BLADE STRYKER SAGITTAL DUAL CUT 18X90X1.19MM

## (undated) DEVICE — SUT MONOCRYL 3-0 18" PS-2 UNDYED

## (undated) DEVICE — TUBING IV EXTENSION MACRO W CLAVE 7"

## (undated) DEVICE — MASK PROCEDURE EARLOOP LVL 2 50/BX

## (undated) DEVICE — ELCTR BOVIE TIP BLADE INSULATED 6.5" EDGE

## (undated) DEVICE — DRAPE STERI-DRAPE INCISE 32X33"

## (undated) DEVICE — SNARE POLYP HEXAGONAL SM 13X2.4X240

## (undated) DEVICE — PREP CHLORAPREP HI-LITE ORANGE 26ML

## (undated) DEVICE — ATTACHMENT DISTAL 4X13.4MM

## (undated) DEVICE — PREP TRAY DRY SKIN PREP SCRUB

## (undated) DEVICE — DRSG 2X2

## (undated) DEVICE — UNDERPAD LINEN SAVER 23 X 36"

## (undated) DEVICE — SUCTION TIP KAMVAC MINI

## (undated) DEVICE — HOOD T5 PEELAWAY

## (undated) DEVICE — SYR LUER SLIP TIP 50CC

## (undated) DEVICE — SSH-ERBE RM1 11351341: Type: DURABLE MEDICAL EQUIPMENT

## (undated) DEVICE — DRAPE LIGHT HANDLE COVER (GREEN)